# Patient Record
Sex: FEMALE | Race: WHITE | NOT HISPANIC OR LATINO | Employment: OTHER | URBAN - METROPOLITAN AREA
[De-identification: names, ages, dates, MRNs, and addresses within clinical notes are randomized per-mention and may not be internally consistent; named-entity substitution may affect disease eponyms.]

---

## 2017-05-06 ENCOUNTER — HOSPITAL ENCOUNTER (EMERGENCY)
Facility: HOSPITAL | Age: 82
Discharge: HOME | End: 2017-05-06
Attending: EMERGENCY MEDICINE
Payer: MEDICARE

## 2017-05-06 VITALS
DIASTOLIC BLOOD PRESSURE: 60 MMHG | HEIGHT: 60 IN | TEMPERATURE: 98 F | OXYGEN SATURATION: 100 % | HEART RATE: 57 BPM | BODY MASS INDEX: 33.38 KG/M2 | SYSTOLIC BLOOD PRESSURE: 174 MMHG | RESPIRATION RATE: 18 BRPM | WEIGHT: 170 LBS

## 2017-05-06 DIAGNOSIS — S00.83XA FOREHEAD CONTUSION, INITIAL ENCOUNTER: ICD-10-CM

## 2017-05-06 DIAGNOSIS — E87.1 HYPONATREMIA: Primary | ICD-10-CM
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
## 2017-05-06 LAB
ALBUMIN SERPL BCP-MCNC: 3.6 G/DL
ALP SERPL-CCNC: 65 U/L
ALT SERPL W/O P-5'-P-CCNC: 114 U/L
ANION GAP SERPL CALC-SCNC: 9 MMOL/L
AST SERPL-CCNC: 72 U/L
BACTERIA #/AREA URNS HPF: NORMAL /HPF
BASOPHILS # BLD AUTO: 0.01 K/UL
BASOPHILS NFR BLD: 0.2 %
BILIRUB SERPL-MCNC: 0.3 MG/DL
BILIRUB UR QL STRIP: NEGATIVE
BUN SERPL-MCNC: 19 MG/DL
CALCIUM SERPL-MCNC: 9.1 MG/DL
CHLORIDE SERPL-SCNC: 98 MMOL/L
CLARITY UR: CLEAR
CO2 SERPL-SCNC: 24 MMOL/L
COLOR UR: YELLOW
CREAT SERPL-MCNC: 1.4 MG/DL
DIFFERENTIAL METHOD: ABNORMAL
EOSINOPHIL # BLD AUTO: 0 K/UL
EOSINOPHIL NFR BLD: 0.7 %
ERYTHROCYTE [DISTWIDTH] IN BLOOD BY AUTOMATED COUNT: 14.3 %
EST. GFR  (AFRICAN AMERICAN): 39 ML/MIN/1.73 M^2
EST. GFR  (NON AFRICAN AMERICAN): 34 ML/MIN/1.73 M^2
GLUCOSE SERPL-MCNC: 124 MG/DL
GLUCOSE UR QL STRIP: NEGATIVE
HCT VFR BLD AUTO: 29.4 %
HGB BLD-MCNC: 9.8 G/DL
HGB UR QL STRIP: NEGATIVE
HYALINE CASTS #/AREA URNS LPF: 0 /LPF
KETONES UR QL STRIP: NEGATIVE
LEUKOCYTE ESTERASE UR QL STRIP: NEGATIVE
LYMPHOCYTES # BLD AUTO: 0.9 K/UL
LYMPHOCYTES NFR BLD: 15.5 %
MCH RBC QN AUTO: 30 PG
MCHC RBC AUTO-ENTMCNC: 33.3 %
MCV RBC AUTO: 90 FL
MICROSCOPIC COMMENT: NORMAL
MONOCYTES # BLD AUTO: 0.6 K/UL
MONOCYTES NFR BLD: 10.5 %
NEUTROPHILS # BLD AUTO: 4.4 K/UL
NEUTROPHILS NFR BLD: 72.8 %
NITRITE UR QL STRIP: NEGATIVE
PH UR STRIP: 6 [PH] (ref 5–8)
PLATELET # BLD AUTO: 175 K/UL
PMV BLD AUTO: 8.8 FL
POTASSIUM SERPL-SCNC: 3.8 MMOL/L
PROT SERPL-MCNC: 6.7 G/DL
PROT UR QL STRIP: ABNORMAL
RBC # BLD AUTO: 3.27 M/UL
RBC #/AREA URNS HPF: 2 /HPF (ref 0–4)
SODIUM SERPL-SCNC: 131 MMOL/L
SP GR UR STRIP: 1.02 (ref 1–1.03)
URN SPEC COLLECT METH UR: ABNORMAL
UROBILINOGEN UR STRIP-ACNC: NEGATIVE EU/DL
WBC # BLD AUTO: 5.99 K/UL
WBC #/AREA URNS HPF: 1 /HPF (ref 0–5)

## 2017-05-06 PROCEDURE — 85025 COMPLETE CBC W/AUTO DIFF WBC: CPT

## 2017-05-06 PROCEDURE — 25000003 PHARM REV CODE 250: Performed by: EMERGENCY MEDICINE

## 2017-05-06 PROCEDURE — 99284 EMERGENCY DEPT VISIT MOD MDM: CPT

## 2017-05-06 PROCEDURE — 81000 URINALYSIS NONAUTO W/SCOPE: CPT

## 2017-05-06 PROCEDURE — 93005 ELECTROCARDIOGRAM TRACING: CPT

## 2017-05-06 PROCEDURE — 80053 COMPREHEN METABOLIC PANEL: CPT

## 2017-05-06 RX ORDER — ESOMEPRAZOLE MAGNESIUM 40 MG/1
40 CAPSULE, DELAYED RELEASE ORAL
COMMUNITY
End: 2017-06-02 | Stop reason: SDUPTHER

## 2017-05-06 RX ORDER — TRAMADOL HYDROCHLORIDE 50 MG/1
50 TABLET ORAL EVERY 6 HOURS PRN
Status: ON HOLD | COMMUNITY
End: 2017-12-06

## 2017-05-06 RX ORDER — SUCRALFATE 1 G/1
1 TABLET ORAL 4 TIMES DAILY
COMMUNITY

## 2017-05-06 RX ORDER — NAPROXEN SODIUM 220 MG/1
81 TABLET, FILM COATED ORAL DAILY
COMMUNITY
End: 2017-09-22 | Stop reason: SDUPTHER

## 2017-05-06 RX ORDER — TRAMADOL HYDROCHLORIDE 50 MG/1
50 TABLET ORAL
Status: COMPLETED | OUTPATIENT
Start: 2017-05-06 | End: 2017-05-06

## 2017-05-06 RX ORDER — AMIODARONE HYDROCHLORIDE 200 MG/1
TABLET ORAL DAILY
COMMUNITY
End: 2017-09-28 | Stop reason: SDUPTHER

## 2017-05-06 RX ORDER — BIMATOPROST 0.3 MG/ML
1 SOLUTION/ DROPS OPHTHALMIC NIGHTLY
COMMUNITY
End: 2017-05-10 | Stop reason: SDUPTHER

## 2017-05-06 RX ORDER — LORATADINE 10 MG/1
10 TABLET ORAL DAILY
COMMUNITY
End: 2017-05-17 | Stop reason: SDUPTHER

## 2017-05-06 RX ORDER — LEVOTHYROXINE SODIUM 50 UG/1
50 TABLET ORAL DAILY
COMMUNITY
End: 2017-06-07 | Stop reason: SDUPTHER

## 2017-05-06 RX ORDER — DEXTROMETHORPHAN HYDROBROMIDE, GUAIFENESIN 5; 100 MG/5ML; MG/5ML
650 LIQUID ORAL EVERY 8 HOURS
COMMUNITY
End: 2017-06-02 | Stop reason: SDUPTHER

## 2017-05-06 RX ORDER — NITROGLYCERIN 0.4 MG/1
0.4 TABLET SUBLINGUAL EVERY 5 MIN PRN
COMMUNITY

## 2017-05-06 RX ORDER — DIPHENHYDRAMINE HCL 25 MG
25 CAPSULE ORAL EVERY 6 HOURS PRN
COMMUNITY
End: 2017-07-03

## 2017-05-06 RX ORDER — TROLAMINE SALICYLATE 10 G/100G
CREAM TOPICAL
COMMUNITY

## 2017-05-06 RX ORDER — BRIMONIDINE TARTRATE AND TIMOLOL MALEATE 2; 5 MG/ML; MG/ML
1 SOLUTION OPHTHALMIC
COMMUNITY
End: 2017-08-01 | Stop reason: SDUPTHER

## 2017-05-06 RX ORDER — LISINOPRIL 20 MG/1
20 TABLET ORAL DAILY
COMMUNITY
End: 2017-06-02 | Stop reason: SDUPTHER

## 2017-05-06 RX ORDER — ROSUVASTATIN CALCIUM 5 MG/1
5 TABLET, COATED ORAL DAILY
Status: ON HOLD | COMMUNITY
End: 2017-09-04 | Stop reason: HOSPADM

## 2017-05-06 RX ORDER — HYDRALAZINE HYDROCHLORIDE 50 MG/1
50 TABLET, FILM COATED ORAL 3 TIMES DAILY
COMMUNITY
End: 2017-06-30 | Stop reason: SDUPTHER

## 2017-05-06 RX ORDER — POLYETHYLENE GLYCOL 3350 17 G/17G
17 POWDER, FOR SOLUTION ORAL DAILY
COMMUNITY
End: 2017-06-07 | Stop reason: SDUPTHER

## 2017-05-06 RX ORDER — METOPROLOL SUCCINATE 25 MG/1
25 TABLET, EXTENDED RELEASE ORAL DAILY
COMMUNITY
End: 2017-11-20 | Stop reason: SDUPTHER

## 2017-05-06 RX ORDER — IPRATROPIUM BROMIDE 0.5 MG/2.5ML
500 SOLUTION RESPIRATORY (INHALATION) 4 TIMES DAILY
COMMUNITY

## 2017-05-06 RX ORDER — HYOSCYAMINE SULFATE 0.125 MG
125 TABLET ORAL EVERY 4 HOURS PRN
COMMUNITY

## 2017-05-06 RX ORDER — DORZOLAMIDE HCL 20 MG/ML
1 SOLUTION/ DROPS OPHTHALMIC 3 TIMES DAILY
COMMUNITY
End: 2017-08-01 | Stop reason: SDUPTHER

## 2017-05-06 RX ADMIN — TRAMADOL HYDROCHLORIDE 50 MG: 50 TABLET, COATED ORAL at 02:05

## 2017-05-06 NOTE — ED AVS SNAPSHOT
[facility] [address]  [place] [address]               Alyssa Hastings   2017 12:59 PM   ED    Description:  Female : 1930   Department:  [facility]           Your Care was Coordinated By:     Provider Role From To    Audrey Block MD Attending Provider 17 1301 --      Reason for Visit     Fall           Diagnoses this Visit        Comments    Hyponatremia    -  Primary     Fall         Forehead contusion, initial encounter           ED Disposition     None           To Do List           Follow-up Information     Please follow up.    Why:  FOLLOW UP WITH YOUR PRIMARY CARE DOCTOR IN 1 WEEK      [facility]     [facility] Care Line -  Assistance  Unless otherwise directed by your provider, please contact [facility], our nurse care line that is available for  assistance.     Registered nurses in the [facility] provide: appointment scheduling, clinical advisement, health education, and other advisory services.  Call: [phone] (toll free)               Medications           Message regarding Medications     Verify the changes and/or additions to your medication regime listed below are the same as discussed with your clinician today.  If any of these changes or additions are incorrect, please notify your healthcare provider.        These medications were administered today        Dose Freq    tramadol tablet 50 mg 50 mg ED 1 Time    Sig: Take 1 tablet (50 mg total) by mouth ED 1 Time.    Class: Normal    Route: Oral           Verify that the below list of medications is an accurate representation of the medications you are currently taking.  If none reported, the list may be blank. If incorrect, please contact your healthcare provider. Carry this list with you in case of emergency.           Current Medications     acetaminophen (TYLENOL) 650 MG TbSR Take 650 mg by mouth every 8 (eight)  hours.    amiodarone (PACERONE) 200 MG Tab Take by mouth once daily.    aspirin 81 MG Chew Take 81 mg by mouth once daily.    bimatoprost (LUMIGAN) 0.03 % ophthalmic drops 1 drop every evening.    brimonidine-timolol (COMBIGAN) 0.2-0.5 % Drop Place 1 drop into both eyes.    diphenhydrAMINE (BENADRYL) 25 mg capsule Take 25 mg by mouth every 6 (six) hours as needed for Itching.    dorzolamide (TRUSOPT) 2 % ophthalmic solution 1 drop 3 (three) times daily.    esomeprazole (NEXIUM) 40 MG capsule Take 40 mg by mouth before breakfast.    hydrALAZINE (APRESOLINE) 50 MG tablet Take 50 mg by mouth 3 (three) times daily.    hyoscyamine (ANASPAZ,LEVSIN) 0.125 mg Tab Take 125 mcg by mouth every 4 (four) hours as needed.    ipratropium (ATROVENT) 0.02 % nebulizer solution Take 500 mcg by nebulization 4 (four) times daily. Rescue    levothyroxine (SYNTHROID) 50 MCG tablet Take 50 mcg by mouth once daily.    lisinopril (PRINIVIL,ZESTRIL) 20 MG tablet Take 20 mg by mouth once daily.    loratadine (CLARITIN) 10 mg tablet Take 10 mg by mouth once daily.    metoprolol succinate (TOPROL-XL) 25 MG 24 hr tablet Take 25 mg by mouth once daily.    nitroGLYCERIN (NITROSTAT) 0.4 MG SL tablet Place 0.4 mg under the tongue every 5 (five) minutes as needed for Chest pain.    polyethylene glycol (GLYCOLAX) 17 gram/dose powder Take 17 g by mouth once daily.    rosuvastatin (CRESTOR) 5 MG tablet Take 5 mg by mouth once daily.    sucralfate (CARAFATE) 1 gram tablet Take 1 g by mouth 4 (four) times daily.    tramadol (ULTRAM) 50 mg tablet Take 50 mg by mouth every 6 (six) hours as needed for Pain.    trolamine salicylate (ASPERCREME) 10 % cream Apply topically as needed.    tramadol tablet 50 mg Take 1 tablet (50 mg total) by mouth ED 1 Time.           Clinical Reference Information           Your Vitals Were     BP Pulse Temp Resp Height Weight    138/53 58 98.4 °F (36.9 °C) (Oral) 18 5' (1.524 m) 77.1 kg (170 lb)    SpO2 BMI             98% 33.2  kg/m2         Allergies as of 5/6/2017        Reactions    Actos [Pioglitazone]     Amlodipine     Bentyl [Dicyclomine]     Benzocaine     Declomycin [Demeclocycline]     Doxycycline     Erythropoietin Analogues     Gabapentin     Hydrocodone     Lipitor [Atorvastatin]     Losartan     Metronidazole     Nortriptyline     Oxybutynin     Pcn [Penicillins]     Pravachol [Pravastatin]     Procaine     Promethazine     Propranolol     Ramipril     Sulfa (Sulfonamide Antibiotics)       Immunizations Administered on Date of Encounter - 5/6/2017     None      ED Micro, Lab, POCT     Start Ordered       Status Ordering Provider    05/06/17 1312 05/06/17 1311  CBC auto differential  STAT      Final result     05/06/17 1312 05/06/17 1311  Comprehensive metabolic panel  STAT      Final result     05/06/17 1312 05/06/17 1311  Urinalysis Clean Catch  STAT      Final result     05/06/17 1311 05/06/17 1311  Urinalysis Microscopic  Once      Final result       ED Imaging Orders     Start Ordered       Status Ordering Provider    05/06/17 1312 05/06/17 1311  CT Head Without Contrast  1 time imaging      Final result     05/06/17 1312 05/06/17 1311  CT Cervical Spine Without Contrast  1 time imaging      Final result         Discharge Instructions         Facial Contusion  A contusion is another word for a bruise. It happens when small blood vessels break open and leak blood into the nearby area. A facial contusion can result from a bump, hit, or fall. This may happen during sports or an accident. Symptoms of a contusion often include changes in skin color (bruising), swelling, and pain.   The swelling from the contusion should decrease in a few days. Bruising and pain may take several weeks to go away.   Home care  · If you have been prescribed medicines for pain, take them as directed.  · To help reduce swelling and pain, wrap a cold pack or bag of frozen peas in a thin towel. Put it on the injured area for up to 20 minutes. Do this  a few times a day until the swelling goes down.   · If you have scrapes or cuts on your face requiring stiches or other closures, care for them as directed.  · For the next 24 hours (or longer if instructed):  ¨ Dont drink alcohol, or use sedatives or medicines that make you sleepy.  ¨ Dont drive or operate machinery.  ¨ Avoid doing anything strenuous. Dont lift or strain.  ¨ Do not return to sports or other activity that could result in another head injury.  Note about concussion  Because the injury was to your head, it is possible that a concussion (mild brain injury) could result. You don't have signs of a concussion at this time. But symptoms can show up later. Be alert for signs and symptoms of a concussion. Seek emergency medical care if any of these develop over the next hours to days:  · Headache  · Nausea or vomiting  · Dizziness  · Sensitivity to light or noise  · Unusual sleepiness or grogginess  · Trouble falling asleep  · Personality changes  · Vision changes  · Memory loss  · Confusion  · Trouble walking or clumsiness  · Loss of consciousness (even for a short time)  · Inability to be awakened   Follow-up care  Follow up with your healthcare provider or our staff as directed.  When to seek medical advice  Call your healthcare provider right away if any of these occur:  · Swelling or pain that gets worse, not better  · New swelling or pain  · Warmth or drainage from the swollen area or from cuts or scrapes  · Fluid drainage or bleeding from the nose or ears  · Fever of 100.4ºF (38ºC) or higher, or as directed by your healthcare provider  Date Last Reviewed: 5/7/2015  © 5478-8498 [employer]. [address] [place] [address], [place] [address]. All rights reserved. This information is not intended as a substitute for professional medical care. Always follow your healthcare professional's instructions.          Fall Due to Dizziness, Weakness, or Loss of Balance  The symptoms that led to your fall  have been evaluated. Your doctor feels it is safe for you to return home.  Many things can cause you to become dizzy. Everyone means a little something different by the word dizzy. People may describe their symptoms using these words:  · It doesnt feel right in my head  · It feels like spinning in my head  · It seems like the room is spinning  · My balance feels off  · I feel lightheaded, like I am going to pass out  All these descriptions can have real causes.     Your balance mechanism is in your inner ear. Anything disturbing it can make you feel dizzy, whether it is from a cold, an injury, or many other things. Anything that causes your blood pressure to drop suddenly can make you feel lightheaded, or like you are going to faint. This is because at that moment there might not be enough blood flowing to your brain. Causes include:  · Medicines  · Dehydration  · Standing up or bending over too quickly  · Becoming overheated  · Taking a hot shower or bath  · Straining hard while lifting something or using the toilet  · Strokes, heart attack, heart valve disease, very slow or very fast heart rate  · Low blood sugar  · Ear infection  · Hyperventilation  · Anemia  · Trauma  · Infection  · Panic attack  · Pregnancy  You may be at risk of repeat falls. Take precautions described below to prevent another fall.  Home care  · If you become lightheaded or dizzy, lie down immediately or sit and lean forward with your head down. It is better to do this than fall and seriously hurt or injure yourself.  · Rest today. When changing position, take a moment to be sure any dizziness goes away before standing and walking.  · If you have been prescribed a walker, be sure to use it whenever you walk, even if it is a short distance.  · If you were injured during the fall, follow the advice from your doctor regarding care of your injury.  · Be sure your doctor knows all the medicines, herbs, and supplements you take. Some medicines can  cause dizziness.  Follow-up care  Unless given other advice, call your doctor on the next office day to advise of your fall and to schedule an appointment. You may require further treatment for the underlying condition that caused todays fall.  If X-ray or a CT scan were done, you will be notified of the results, especially if it affects treatment.  Call 911  Call 911 if any of these occur:  · Trouble breathing  · Confused or difficulty arousing  · Fainting or loss of consciousness  · Rapid or very slow heart rate  · Seizure  · Difficulty with speech or vision, weakness of an arm or leg  · Difficulty walking or talking, loss of balance  · Numbness or weakness in one side of your body, facial droop  When to seek medical advice  Call your healthcare provider right away if any of the following occur:  · Another fall  · Continued dizzy spells  · Severe headache  · Blood in vomit or stools (black or red color)  Date Last Reviewed: 11/5/2015  © 7132-0806 [employer]. [address] [place] [address]. All rights reserved. This information is not intended as a substitute for professional medical care. Always follow your healthcare professional's instructions.          [facility] Sign-Up     Activating your [facility] account is as easy as 1-2-3!     1) Visit [email or web address], select Sign Up Now, enter this activation code and your date of birth, then select Next.  [email or web address]  Expires: 6/20/2017  2:39 PM      2) Create a username and password to use when you visit [facility] in the future and select a security question in case you lose your password and select Next.    3) Enter your e-mail address and click Sign Up!    Additional Information  If you have questions, please e-mail [email or web address] or call [phone] to talk to our [facility] staff. Remember, [facility] is NOT to be used for urgent needs. For medical emergencies, dial 911.          [facility] complies with  applicable Federal civil rights laws and does not discriminate on the basis of race, color, national origin, age, disability, or sex.        Language Assistance Services     ATTENTION: Language assistance services are available, free of charge. Please call [phone].      ATENCIÓN: Si habla sheldon, tiene a bailey disposición servicios gratuitos de asistencia lingüística. Llame al [phone].     CHÚ Ý: N?u b?n nói Ti?ng Vi?t, có các d?ch v? h? tr? ngôn ng? mi?n phí dành cho b?n. G?i s? [phone].

## 2017-05-06 NOTE — ED PROVIDER NOTES
"Encounter Date: 5/6/2017       History     Chief Complaint   Patient presents with    Fall     unwitnessed fall one hour pta; [facility] and was found on ground by an aide; denies LOC; states felt dizzy before falling; bruising noted to left forehead; son reports pt moved recent and has had decreased appetite and has been increasingly anxious     Review of patient's allergies indicates:   Allergen Reactions    Actos [pioglitazone]     Amlodipine     Bentyl [dicyclomine]     Benzocaine     Declomycin [demeclocycline]     Doxycycline     Erythropoietin analogues     Gabapentin     Hydrocodone     Lipitor [atorvastatin]     Losartan     Metronidazole     Nortriptyline     Oxybutynin     Pcn [penicillins]     Pravachol [pravastatin]     Procaine     Promethazine     Propranolol     Ramipril     Sulfa (sulfonamide antibiotics)      HPI Comments: 87 Y/O WF PRESENTS TO ED FOR EVALUATION S/P FALL.  PT HAS RECENTLY MOVED TO AN ASSISTED LIVING FACILITY AND REPORTS SHE IS ANXIOUS OVER THE MOVE.  TODAY SHE WAS ATTEMPTING TO SIT IN A CHAIR AND WHILE LOWERING HER BODY TO THE CHAIR, SHE BEGAN DIZZY AND FELL FORWARD HITTING HER HEAD ON A PIECE OF FURNITURE.  NO LOC.  THE FALL WAS UNWITNESSED BUT THE NURSING STAFF ARRIVED SHORTLY AFTER AND HELPED THE PATIENT INTO THE CHAIR.  PT DENIES ANY LOC OR NECK PAIN.  + FOREHEAD CONTUSION.  NO LACERATION OR BLEEDING.  NO SYNCOPE.  PT DENIES ANY DIZZINESS AT THIS TIME.  SHE STATES THAT FOR THE PAST FEW DAYS, SHE HAS HAD N/V AFTER EATING SOMETHING THAT UPSET HER STOMACH.  NO DIARRHEA.  NO KNOWN SICK CONTACTS.  NO VOMITING TODAY.  PT REPORTS EATING BREAKFAST W/O N/V THIS MORNING.  NO ABD PAIN.  NO CP, PALPITATIONS, SOB, DIAPHORESIS OR NEW MEDICATION.  PT CURRENTLY HAS NO COMPLAINTS OTHER THAN FEELING "SORE" ON HER FOREHEAD.      The history is provided by the patient. No  was used.     Past Medical History:   Diagnosis Date    " Coronary artery disease     Diabetes mellitus     GERD (gastroesophageal reflux disease)     Hypertension     Stroke     Thyroid disease      Past Surgical History:   Procedure Laterality Date    APPENDECTOMY      CARDIAC SURGERY      CHOLECYSTECTOMY      EYE SURGERY      HYSTERECTOMY       History reviewed. No pertinent family history.  Social History   Substance Use Topics    Smoking status: Never Smoker    Smokeless tobacco: None    Alcohol use No     Review of Systems   Constitutional: Negative for chills and fever.   HENT: Negative for congestion and sore throat.         FOREHEAD CONTUSION   Eyes: Negative for photophobia and visual disturbance.   Respiratory: Negative for cough and shortness of breath.    Cardiovascular: Negative for chest pain and palpitations.   Gastrointestinal: Negative for abdominal pain, constipation and diarrhea.        N/V OVER THE PREVIOUS 2 DAYS.  NONE TODAY   Endocrine: Negative for polydipsia and polyphagia.   Genitourinary: Negative for difficulty urinating and dysuria.   Musculoskeletal: Positive for neck pain. Negative for back pain and joint swelling.        CHRONIC NECK PAIN, LEFT THUMB PAIN   Skin: Positive for wound. Negative for pallor.        FOREHEAD CONTUSION   Allergic/Immunologic: Negative for immunocompromised state.   Neurological: Positive for dizziness. Negative for tremors, seizures, syncope, facial asymmetry, speech difficulty, weakness, light-headedness, numbness and headaches.   Hematological: Does not bruise/bleed easily.   Psychiatric/Behavioral: Negative for agitation and confusion.   All other systems reviewed and are negative.      Physical Exam   Initial Vitals   BP Pulse Resp Temp SpO2   05/06/17 1235 05/06/17 1235 05/06/17 1235 05/06/17 1235 05/06/17 1235   101/54 58 18 98.4 °F (36.9 °C) 94 %     Physical Exam    Nursing note and vitals reviewed.  Constitutional: She appears well-developed and well-nourished. She is not diaphoretic. No  distress.   HENT:   Head: Normocephalic. Not macrocephalic and not microcephalic. Head is with contusion. Head is without Joe's sign, without abrasion, without laceration, without right periorbital erythema and without left periorbital erythema.       Nose: Nose normal.   FOREHEAD CONTUSION   Eyes: Conjunctivae and EOM are normal. Pupils are equal, round, and reactive to light. No scleral icterus.   Neck: Normal range of motion. Neck supple.   Cardiovascular: Regular rhythm and normal heart sounds. Exam reveals no gallop and no friction rub.    No murmur heard.  SINUS BRADYCARDIA   Pulmonary/Chest: Breath sounds normal. No respiratory distress. She has no wheezes. She has no rhonchi. She has no rales.   Abdominal: Soft. Bowel sounds are normal. She exhibits no distension. There is no tenderness. There is no rebound and no guarding.   Musculoskeletal: Normal range of motion. She exhibits no tenderness.    PT REPORTS MILD LEFT THUMB PAIN.  SHE HAS FULL ROM AND NO DEFORMITY OR OBVIOUS INJURY.  SENSATION INTACT.     Lymphadenopathy:     She has no cervical adenopathy.   Neurological: She is alert and oriented to person, place, and time. She has normal strength. No cranial nerve deficit or sensory deficit.   Skin: Skin is warm and dry. No rash noted. No erythema.   Psychiatric: She has a normal mood and affect. Her behavior is normal. Judgment and thought content normal.         ED Course   Procedures  Labs Reviewed   CBC W/ AUTO DIFFERENTIAL - Abnormal; Notable for the following:        Result Value    RBC 3.27 (*)     Hemoglobin 9.8 (*)     Hematocrit 29.4 (*)     MPV 8.8 (*)     Lymph # 0.9 (*)     Lymph% 15.5 (*)     All other components within normal limits   COMPREHENSIVE METABOLIC PANEL   URINALYSIS     EKG Readings: (Independently Interpreted)   Initial Reading: No STEMI. Heart Rate: 59.          Medical Decision Making:   Initial Assessment:   85 Y/O F PRESENTS FOR EVALUATION S/P FALL.  PT IS CURRENTLY  ASYMPTOMATIC.  PT IS NV INTACT BY EXAM.    Differential Diagnosis:   DDX:  SUBDURAL HEMATOMA, SCALP LACERATION, DIZZINESS, NEAR SYNCOPE, METABOLIC DISORDER, VIRAL ILLNESS, DEHYDRATION, UTI, ARRHYTHMIA.   Independently Interpreted Test(s):   I have ordered and independently interpreted EKG Reading(s) - see prior notes  Clinical Tests:   Lab Tests: Ordered and Reviewed       <> Summary of Lab: HYPONATREMIA  Radiological Study: Ordered and Reviewed  Medical Tests: Ordered and Reviewed  ED Management:   PT REFUSES XRAY OF LEFT THUMB.  WORK UP IN ED ONLY + FOR HYPONATREMIA.  PT REPORTS THAT SHE DOES DRINK LARGE AMOUNTS OF WATER DURING THE DAY.  HER B/P IS TYPICALLY CONTROLLED.  CT BRAIN AND C-SPINE NEG.  I HAVE DISCUSSED THE NEED FOR F/U WITH PT AND HER SON.  THEY VERBALIZE UNDERSTANDING AND AGREEMENT OF D/C PLAN.      Pt counseled on their diagnosis and the importance of following up with PCP.  Pt also cautioned on when to return to ED.  Pt verbalizes understanding of discharge plan and will return to ED immediately if symptoms worsen                     ED Course     Clinical Impression:   The primary encounter diagnosis was Hyponatremia. Diagnoses of Fall and Forehead contusion, initial encounter were also pertinent to this visit.    Disposition:   Disposition: Discharged  Condition: Stable       Audrey Block MD  05/06/17 1518       Audrey Block MD  05/06/17 1511

## 2017-05-06 NOTE — ED NOTES
Patient has verified the spelling of their name and  on armband  LOC: The patient is awake, alert, and aware of environment with an appropriate affect, the patient is oriented x 4 and speaking appropriately.   APPEARANCE: Patient resting comfortably and in no acute distress, patient is clean and well groomed, patient's clothing is properly fastened.   SKIN: The skin is warm and dry, color consistent with ethnicity, patient has normal skin turgor and moist mucus membranes,  Bruising and slight swelling noted to left side forehead s/p fall.   : Voids without difficulty  MUSCULOSKELETAL: Patient moving all extremities spontaneously, no obvious swelling or deformities noted, generalized weakness.   RESPIRATORY: Airway is open and patent, respirations are spontaneous, patient has a normal effort and rate, no accessory muscle use noted, bilateral breath sounds clear, denies SOB   ABDOMEN: Soft and non tender to palpation, no distention noted, normoactive bowel sounds present in all four quadrants.   CARDIAC: Normal rate and rhythm, no peripheral edema noted, less then 3 second capillary refill, denies chest pain  COMPLAINT:

## 2017-05-10 ENCOUNTER — EXTERNAL VISIT (OUTPATIENT)
Dept: FAMILY MEDICINE | Facility: CLINIC | Age: 82
End: 2017-05-10
Payer: MEDICARE

## 2017-05-10 VITALS
HEART RATE: 66 BPM | RESPIRATION RATE: 18 BRPM | DIASTOLIC BLOOD PRESSURE: 64 MMHG | OXYGEN SATURATION: 99 % | TEMPERATURE: 98 F | SYSTOLIC BLOOD PRESSURE: 130 MMHG

## 2017-05-10 DIAGNOSIS — H40.9 GLAUCOMA OF BOTH EYES, UNSPECIFIED GLAUCOMA: ICD-10-CM

## 2017-05-10 DIAGNOSIS — H35.30 MACULAR DEGENERATION OF BOTH EYES: ICD-10-CM

## 2017-05-10 DIAGNOSIS — H54.40 BLINDNESS OF LEFT EYE: ICD-10-CM

## 2017-05-10 DIAGNOSIS — I10 ESSENTIAL HYPERTENSION: Primary | ICD-10-CM

## 2017-05-10 DIAGNOSIS — J30.89 ALLERGIC RHINITIS DUE TO OTHER ALLERGIC TRIGGER, UNSPECIFIED RHINITIS SEASONALITY: ICD-10-CM

## 2017-05-10 DIAGNOSIS — E03.9 HYPOTHYROIDISM, UNSPECIFIED TYPE: ICD-10-CM

## 2017-05-10 PROBLEM — J30.9 ALLERGIC RHINITIS DUE TO ALLERGEN: Status: ACTIVE | Noted: 2017-05-10

## 2017-05-10 PROCEDURE — 99204 OFFICE O/P NEW MOD 45 MIN: CPT | Mod: S$GLB,,, | Performed by: FAMILY MEDICINE

## 2017-05-10 RX ORDER — OLOPATADINE HYDROCHLORIDE 2 MG/ML
1 SOLUTION/ DROPS OPHTHALMIC DAILY
Qty: 2.5 ML | Refills: 2 | Status: SHIPPED | OUTPATIENT
Start: 2017-05-10 | End: 2017-08-01 | Stop reason: SDUPTHER

## 2017-05-10 RX ORDER — IPRATROPIUM BROMIDE 42 UG/1
1 SPRAY, METERED NASAL 2 TIMES DAILY PRN
Qty: 15 ML | Refills: 1 | Status: SHIPPED | OUTPATIENT
Start: 2017-05-10 | End: 2017-10-16 | Stop reason: SDUPTHER

## 2017-05-10 RX ORDER — BIMATOPROST 0.3 MG/ML
1 SOLUTION/ DROPS OPHTHALMIC NIGHTLY
Qty: 2.5 ML | Refills: 3 | Status: SHIPPED | OUTPATIENT
Start: 2017-05-10 | End: 2017-06-07 | Stop reason: SDUPTHER

## 2017-05-10 NOTE — PROGRESS NOTES
Subjective:       Patient ID: Alyssa Hastings is a 86 y.o. female.    Chief Complaint: Medication Refill    HPI 86 year old female seen in [facility] this am for medication refills. She would like to discuss taking her glaucoma eye drops and nasal spray in her room at nighttime. Her other medication is administered by the staff. She moved from [place] to the [facility] and a few days into her stay here, she fell. She does not remember losing consciousness. She fell and hit her head and the left side of her face on 5/6/17. CT scans done at the local ED did not show a fracture of cervical spine or acute abnormalities on ct head.   Review of Systems   Constitutional: Negative for chills and fever.   Eyes: Negative for visual disturbance.   Respiratory: Negative for chest tightness and shortness of breath.    Cardiovascular: Negative for chest pain and leg swelling.   Gastrointestinal: Positive for nausea. Negative for abdominal pain, diarrhea and vomiting.       Objective:      Vitals:    05/10/17 1007   BP: 130/64   Pulse: 66   Resp: 18   Temp: 98.4 °F (36.9 °C)     Physical Exam   Constitutional: She appears well-developed and well-nourished. No distress.   HENT:   Bruising perioribtally of left eye and right eye, moreso left. TTP over left eyebrow, improving per patient  No ocular deficit  Limited peripheral vision-chronic     Cardiovascular: Normal rate and regular rhythm.    Pulmonary/Chest: Effort normal. She has no wheezes.   Abdominal: Soft. There is no tenderness. There is no rebound and no guarding.   Neurological: She is alert.   Psychiatric: She has a normal mood and affect. Her behavior is normal. Judgment and thought content normal.   Vitals reviewed.      Assessment:       1. Essential hypertension    2. Hypothyroidism, unspecified type    3. Glaucoma of both eyes, unspecified glaucoma    4. Macular degeneration of both eyes    5. Blindness of left eye    6. Allergic rhinitis due to other  allergic trigger, unspecified rhinitis seasonality        Plan:         1. HTN. Well controlled.   2. Hypothyroidism: will check tsh in 6 months. Continue synthroid 50 mcg daily  3. Glaucoma, macular degeneration, and blindness of left eye: refer to ophtho to establish care. Patient and nurses understand to let patient administer eye drops and ansal spray  4. Recent fall with facial bruising, pain and swelling improving. Patient to monitor and alert medical staff if swelling/pain worsens.  5. RTC prn  Essential hypertension    Hypothyroidism, unspecified type    Glaucoma of both eyes, unspecified glaucoma  -     Ambulatory referral to Ophthalmology    Macular degeneration of both eyes  -     Ambulatory referral to Ophthalmology    Blindness of left eye  -     Ambulatory referral to Ophthalmology    Allergic rhinitis due to other allergic trigger, unspecified rhinitis seasonality    Other orders  -     ipratropium (ATROVENT) 0.06 % nasal spray; 1 spray by Nasal route 2 (two) times daily as needed for Rhinitis.  Dispense: 15 mL; Refill: 1  -     olopatadine (PATADAY) 0.2 % Drop; Place 1 drop into both eyes once daily.  Dispense: 2.5 mL; Refill: 2  -     bimatoprost (LUMIGAN) 0.03 % ophthalmic drops; Place 1 drop into both eyes nightly.  Dispense: 2.5 mL; Refill: 3      Return if symptoms worsen or fail to improve.

## 2017-05-17 ENCOUNTER — TELEPHONE (OUTPATIENT)
Dept: FAMILY MEDICINE | Facility: CLINIC | Age: 82
End: 2017-05-17

## 2017-05-17 RX ORDER — LORATADINE 10 MG/1
10 TABLET ORAL DAILY
Qty: 30 TABLET | Refills: 0 | Status: SHIPPED | OUTPATIENT
Start: 2017-05-17 | End: 2017-06-07

## 2017-05-17 NOTE — TELEPHONE ENCOUNTER
----- Message from Meera Vital sent at 5/17/2017  2:44 PM CDT -----  SABINO  At [facility]    [phone]  SABINO called to say patient wants something for allergies  Like Claratin  Or Zyrtec

## 2017-05-24 ENCOUNTER — EXTERNAL VISIT (OUTPATIENT)
Dept: FAMILY MEDICINE | Facility: CLINIC | Age: 82
End: 2017-05-24
Payer: MEDICARE

## 2017-05-24 VITALS
HEART RATE: 60 BPM | DIASTOLIC BLOOD PRESSURE: 60 MMHG | SYSTOLIC BLOOD PRESSURE: 136 MMHG | RESPIRATION RATE: 16 BRPM | OXYGEN SATURATION: 99 %

## 2017-05-24 DIAGNOSIS — M54.50 ACUTE RIGHT-SIDED LOW BACK PAIN WITHOUT SCIATICA: ICD-10-CM

## 2017-05-24 DIAGNOSIS — J30.89 ALLERGIC RHINITIS DUE TO OTHER ALLERGIC TRIGGER, UNSPECIFIED RHINITIS SEASONALITY: Primary | ICD-10-CM

## 2017-05-24 DIAGNOSIS — K59.00 CONSTIPATION, UNSPECIFIED CONSTIPATION TYPE: ICD-10-CM

## 2017-05-24 DIAGNOSIS — I10 ESSENTIAL HYPERTENSION: ICD-10-CM

## 2017-05-24 PROCEDURE — 99214 OFFICE O/P EST MOD 30 MIN: CPT | Mod: S$GLB,,, | Performed by: FAMILY MEDICINE

## 2017-05-24 NOTE — PROGRESS NOTES
Subjective:       Patient ID: Alyssa Hastings is a 86 y.o. female.    Chief Complaint: Back Pain; Constipation; and Allergies    HPI  86 year old female seen today for back pain for 2-3 days. Paitent has not had urinary changes. She states the pain is sharp, non radiating, and 7/10 in intensity. She takes tramadol twice a day prn which helps with the pain. Patient is interested in PT/OT. THe heating pad also helps.  Patient Is requesting claritin to be given daily.   Patient has had constipation all her life. She takes miralax, and prune juice. She states prune juice helps the most. Her last bowel movement was yesterday. She denies blood in her stool.   Review of Systems   Constitutional: Negative for appetite change, chills and fever.   Respiratory: Negative for chest tightness and shortness of breath.    Cardiovascular: Negative for chest pain and leg swelling.   Gastrointestinal: Positive for abdominal pain and constipation. Negative for blood in stool, diarrhea and nausea.   Genitourinary: Negative for dysuria, flank pain and hematuria.   Musculoskeletal: Positive for back pain.   Psychiatric/Behavioral: Negative for agitation and behavioral problems.       Objective:      Vitals:    05/24/17 0820   BP: 136/60   Pulse: 60   Resp: 16     Physical Exam   Constitutional: She is oriented to person, place, and time. She appears well-developed and well-nourished. No distress.   HENT:   Mouth/Throat: Oropharynx is clear and moist. No oropharyngeal exudate.   Eyes: EOM are normal. Right eye exhibits no discharge. Left eye exhibits no discharge.   Cardiovascular: Normal rate and regular rhythm.    Pulmonary/Chest: Effort normal. She has no wheezes.   Abdominal: Soft. There is tenderness. There is no rebound and no guarding.   Lower abdominal tenderness, without rebound or guarding   Musculoskeletal:        Lumbar back: She exhibits tenderness. She exhibits normal range of motion, no bony tenderness, no swelling and no  edema.        Back:    Neurological: She is alert and oriented to person, place, and time.   Skin: She is not diaphoretic.   Vitals reviewed.    straight leg negative bilaterally  Assessment:       1. Allergic rhinitis due to other allergic trigger, unspecified rhinitis seasonality    2. Essential hypertension    3. Acute right-sided low back pain without sciatica    4. Constipation, unspecified constipation type        Plan:         1. Allergic rhinitis: claritin changed from prn to daily. Continue nasal spray  2. HTN: well controlled  3. Acute lower back pain, right-likely MSK strain. Tramadol prn pain, and will order PT/OT today  4. Constipation: i have advised on increasing fiber via metamucil, prune juice, increasing water intake, and miralax prn.   Allergic rhinitis due to other allergic trigger, unspecified rhinitis seasonality    Essential hypertension    Acute right-sided low back pain without sciatica    Constipation, unspecified constipation type      Return if symptoms worsen or fail to improve.

## 2017-06-02 RX ORDER — ESOMEPRAZOLE MAGNESIUM 40 MG/1
40 CAPSULE, DELAYED RELEASE ORAL
Qty: 30 CAPSULE | Refills: 5 | Status: SHIPPED | OUTPATIENT
Start: 2017-06-02 | End: 2017-11-09 | Stop reason: SDUPTHER

## 2017-06-02 RX ORDER — LISINOPRIL 20 MG/1
20 TABLET ORAL DAILY
Qty: 30 TABLET | Refills: 5 | Status: SHIPPED | OUTPATIENT
Start: 2017-06-02 | End: 2017-07-19

## 2017-06-02 RX ORDER — DEXTROMETHORPHAN HYDROBROMIDE, GUAIFENESIN 5; 100 MG/5ML; MG/5ML
650 LIQUID ORAL EVERY 8 HOURS
Qty: 90 TABLET | Refills: 1 | Status: SHIPPED | OUTPATIENT
Start: 2017-06-02 | End: 2017-06-27 | Stop reason: SDUPTHER

## 2017-06-07 ENCOUNTER — EXTERNAL VISIT (OUTPATIENT)
Dept: FAMILY MEDICINE | Facility: CLINIC | Age: 82
End: 2017-06-07
Payer: MEDICARE

## 2017-06-07 VITALS
BODY MASS INDEX: 32.22 KG/M2 | HEART RATE: 93 BPM | SYSTOLIC BLOOD PRESSURE: 130 MMHG | DIASTOLIC BLOOD PRESSURE: 70 MMHG | WEIGHT: 165 LBS

## 2017-06-07 DIAGNOSIS — H35.30 MACULAR DEGENERATION OF BOTH EYES: ICD-10-CM

## 2017-06-07 DIAGNOSIS — J30.89 ALLERGIC RHINITIS DUE TO OTHER ALLERGIC TRIGGER, UNSPECIFIED RHINITIS SEASONALITY: ICD-10-CM

## 2017-06-07 DIAGNOSIS — F32.A DEPRESSION, UNSPECIFIED DEPRESSION TYPE: ICD-10-CM

## 2017-06-07 DIAGNOSIS — H40.9 GLAUCOMA OF BOTH EYES, UNSPECIFIED GLAUCOMA: Primary | ICD-10-CM

## 2017-06-07 DIAGNOSIS — E03.9 HYPOTHYROIDISM, UNSPECIFIED TYPE: ICD-10-CM

## 2017-06-07 PROCEDURE — 99214 OFFICE O/P EST MOD 30 MIN: CPT | Mod: S$GLB,,, | Performed by: FAMILY MEDICINE

## 2017-06-07 RX ORDER — POLYETHYLENE GLYCOL 3350 17 G/17G
17 POWDER, FOR SOLUTION ORAL DAILY PRN
Qty: 119 G | Refills: 1 | Status: SHIPPED | OUTPATIENT
Start: 2017-06-07 | End: 2017-08-16

## 2017-06-07 RX ORDER — LEVOCETIRIZINE DIHYDROCHLORIDE 5 MG/1
5 TABLET, FILM COATED ORAL NIGHTLY
Qty: 30 TABLET | Refills: 5 | Status: SHIPPED | OUTPATIENT
Start: 2017-06-07 | End: 2017-06-30 | Stop reason: SDUPTHER

## 2017-06-07 RX ORDER — LEVOTHYROXINE SODIUM 50 UG/1
50 TABLET ORAL DAILY
Qty: 30 TABLET | Refills: 5 | Status: SHIPPED | OUTPATIENT
Start: 2017-06-07

## 2017-06-07 RX ORDER — BIMATOPROST 0.3 MG/ML
1 SOLUTION/ DROPS OPHTHALMIC NIGHTLY
Qty: 2.5 ML | Refills: 3 | Status: SHIPPED | OUTPATIENT
Start: 2017-06-07 | End: 2017-08-01 | Stop reason: SDUPTHER

## 2017-06-07 NOTE — PROGRESS NOTES
Subjective:       Patient ID: Alyssa Hastings is a 86 y.o. female.    Chief Complaint: Follow-up; Allergies; and Depression    HPI 86 year old female here for a referral to an ophthalmologist for bilateral macular degeneration and glaucoma.   Peng has chronic allergic rhinitis. She takes ipratropium nasal spray and claritin daily. Patient is interested in switching antihistamines.  Patient has symptoms of depression since moving here. She states she needs more light in her room. Patient is seeing a counselor as of yesterday and states that is helpful. She denies SI/HI. She says it is difficult to communicate with other residents as they seem to be more reserved.   Review of Systems   Constitutional: Negative for chills and fever.   Respiratory: Negative for chest tightness and shortness of breath.    Cardiovascular: Negative for chest pain and leg swelling.   Gastrointestinal: Negative for abdominal pain, diarrhea, nausea and vomiting.   Musculoskeletal: Positive for arthralgias.       Objective:      Vitals:    06/07/17 0844   BP: 130/70   Pulse: 93     Physical Exam   Constitutional: She is oriented to person, place, and time. She appears well-developed and well-nourished. No distress.   HENT:   Mouth/Throat: Oropharynx is clear and moist. No oropharyngeal exudate.   Eyes: EOM are normal.   Cardiovascular: Normal rate and regular rhythm.    Pulmonary/Chest: Effort normal. She has no wheezes.   Abdominal: Soft. There is no tenderness.   Neurological: She is alert and oriented to person, place, and time.   Skin: She is not diaphoretic.       Assessment:       1. Glaucoma of both eyes, unspecified glaucoma    2. Macular degeneration of both eyes    3. Allergic rhinitis due to other allergic trigger, unspecified rhinitis seasonality    4. Depression, unspecified depression type    5. Hypothyroidism, unspecified type        Plan:         1. Glaucoma of both eyes and macular degeneration: referral to ophtho placed to  establish care  2. Allergic rhinitis: advised on switching to xyzal from claritin.   3. Hypothyroidism: check tsh. Refilled synthroid  4. Depression: as symptoms are correlate to date of move in. Likely adjustment disorder. Patient just began counseling and states it has helped so i have advised on continuing this. If there is no improvement i will recommend a SSRI medication in the coming months.   RTC prn.   Glaucoma of both eyes, unspecified glaucoma  -     Ambulatory Referral to Ophthalmology    Macular degeneration of both eyes  -     Ambulatory Referral to Ophthalmology    Allergic rhinitis due to other allergic trigger, unspecified rhinitis seasonality    Depression, unspecified depression type    Hypothyroidism, unspecified type  -     TSH; Future; Expected date: 06/07/2017    Other orders  -     levocetirizine (XYZAL) 5 MG tablet; Take 1 tablet (5 mg total) by mouth every evening.  Dispense: 30 tablet; Refill: 5  -     levothyroxine (SYNTHROID) 50 MCG tablet; Take 1 tablet (50 mcg total) by mouth once daily.  Dispense: 30 tablet; Refill: 5  -     bimatoprost (LUMIGAN) 0.03 % ophthalmic drops; Place 1 drop into both eyes nightly.  Dispense: 2.5 mL; Refill: 3      Return if symptoms worsen or fail to improve.

## 2017-06-13 ENCOUNTER — TELEPHONE (OUTPATIENT)
Dept: FAMILY MEDICINE | Facility: CLINIC | Age: 82
End: 2017-06-13

## 2017-06-13 DIAGNOSIS — R79.89 ABNORMAL TSH: Primary | ICD-10-CM

## 2017-06-13 NOTE — TELEPHONE ENCOUNTER
----- Message from Suad Viramontes sent at 6/13/2017 10:14 AM CDT -----  Contact: Dian [phone]  Dian a nurse with [facility] is calling about pt TSH level is 7.03. Should they stop thryroid medication  until she sees the pt next week, or do you want the pt to continue with the thyroid medication. Please call her at [phone] ask for Dian.

## 2017-06-15 ENCOUNTER — TELEPHONE (OUTPATIENT)
Dept: FAMILY MEDICINE | Facility: CLINIC | Age: 82
End: 2017-06-15

## 2017-06-15 NOTE — TELEPHONE ENCOUNTER
----- Message from Meera Vital sent at 6/15/2017 10:40 AM CDT -----  Connie with [facility]    [phone]  Connie called to say patient is requesting order for benedryl  For sinus and nasal drip  States she is not able to eat because of this

## 2017-06-16 ENCOUNTER — TELEPHONE (OUTPATIENT)
Dept: FAMILY MEDICINE | Facility: CLINIC | Age: 82
End: 2017-06-16

## 2017-06-16 NOTE — TELEPHONE ENCOUNTER
----- Message from Meera Vital sent at 6/16/2017  2:16 PM CDT -----  [facility] health    Elle   [phone]  Elle is calling to say she needs to extension on home health OT

## 2017-06-20 ENCOUNTER — INITIAL CONSULT (OUTPATIENT)
Dept: OPHTHALMOLOGY | Facility: CLINIC | Age: 82
End: 2017-06-20
Payer: MEDICARE

## 2017-06-20 VITALS — DIASTOLIC BLOOD PRESSURE: 72 MMHG | HEART RATE: 66 BPM | SYSTOLIC BLOOD PRESSURE: 145 MMHG

## 2017-06-20 DIAGNOSIS — H40.1132 PRIMARY OPEN-ANGLE GLAUCOMA, BILATERAL, MODERATE STAGE: ICD-10-CM

## 2017-06-20 DIAGNOSIS — H31.102: ICD-10-CM

## 2017-06-20 DIAGNOSIS — H35.3112 NONEXUDATIVE AGE-RELATED MACULAR DEGENERATION, RIGHT EYE, INTERMEDIATE DRY STAGE: Primary | ICD-10-CM

## 2017-06-20 PROCEDURE — 92225 PR SPECIAL EYE EXAM, INITIAL: CPT | Mod: RT,S$GLB,, | Performed by: OPHTHALMOLOGY

## 2017-06-20 PROCEDURE — 99999 PR PBB SHADOW E&M-EST. PATIENT-LVL IV: CPT | Mod: PBBFAC,,, | Performed by: OPHTHALMOLOGY

## 2017-06-20 PROCEDURE — 92004 COMPRE OPH EXAM NEW PT 1/>: CPT | Mod: S$GLB,,, | Performed by: OPHTHALMOLOGY

## 2017-06-20 PROCEDURE — 92134 CPTRZ OPH DX IMG PST SGM RTA: CPT | Mod: S$GLB,,, | Performed by: OPHTHALMOLOGY

## 2017-06-20 NOTE — LETTER
June 20, 2017      Ashley Johnson MD  [address]   [address]  [facility] [address]           [facility] - Ophthalmology  [address]  [place] [address]  Phone: [phone]  Fax: [phone]          Patient: Alyssa Hastings   MR Number: [id number]   YOB: 1930   Date of Visit: 6/20/2017       Dear Dr. Ashley Johnson:    Thank you for referring Alyssa Hastings to me for evaluation. Attached you will find relevant portions of my assessment and plan of care.    If you have questions, please do not hesitate to call me. I look forward to following Alyssa Hastings along with you.    Sincerely,    CRYSTAL Howell MD    Enclosure  CC:  No Recipients    If you would like to receive this communication electronically, please contact [email or web address] or [phone] to request more information on [employer] Link access.    For providers and/or their staff who would like to refer a patient to [facility], please contact us through our one-stop-shop provider referral line, [facility], at [phone].    If you feel you have received this communication in error or would no longer like to receive these types of communications, please e-mail [email or web address]

## 2017-06-20 NOTE — PROGRESS NOTES
Patient is here to establish eye care after moving from [place]. She was   being followed for AMD and glaucoma. She has had cataract sx in both eyes   [address] Dr Basurto. She had laser sx OS She is diabetic which is under   pretty good control.    Eye vitamins daily 3 tablets  Dorzolamide x 2 OS  Combigan x 2 OS  Lumigan qhs OU  Pataday daily OU    OCT - OD - [facility] - No SRF  OS macular atrophy      A/P    1. Dry AMD OD  AREDS/AG    2. Blind OS with atrophy  S/p sector PRP -?prior RVO  Also with pale nerve - ?AION  No NV today  Will obtain old records    3. POAG  On Combigan OU  Dorzolamide OS BID  Lumigan QHS OU    4. PCIOL OU        Retina 1 year OCT

## 2017-06-21 ENCOUNTER — DOCUMENTATION ONLY (OUTPATIENT)
Dept: FAMILY MEDICINE | Facility: CLINIC | Age: 82
End: 2017-06-21

## 2017-06-21 NOTE — PROGRESS NOTES
Patient seen today for 2 day history of burning during urination. Patient states symptoms are intermittent and made better when increasing hydration with water and cranberry juice. She is afebrile today (at [facility]). She does not have suprapubic pressure/cva tenderness.   i have ordered a urine culture and started cipro 250 mg BID for 3 days.   Plan communicated to nurse at [facility].

## 2017-06-27 ENCOUNTER — DOCUMENTATION ONLY (OUTPATIENT)
Dept: FAMILY MEDICINE | Facility: CLINIC | Age: 82
End: 2017-06-27

## 2017-06-27 RX ORDER — DEXTROMETHORPHAN HYDROBROMIDE, GUAIFENESIN 5; 100 MG/5ML; MG/5ML
650 LIQUID ORAL EVERY 8 HOURS PRN
Qty: 90 TABLET | Refills: 0 | Status: SHIPPED | OUTPATIENT
Start: 2017-06-27 | End: 2017-07-19

## 2017-06-30 RX ORDER — HYDRALAZINE HYDROCHLORIDE 50 MG/1
50 TABLET, FILM COATED ORAL 3 TIMES DAILY
Qty: 90 TABLET | Refills: 11 | Status: SHIPPED | OUTPATIENT
Start: 2017-06-30 | End: 2017-10-11 | Stop reason: ALTCHOICE

## 2017-07-03 RX ORDER — LEVOCETIRIZINE DIHYDROCHLORIDE 5 MG/1
TABLET, FILM COATED ORAL
Qty: 30 TABLET | Refills: 3 | Status: SHIPPED | OUTPATIENT
Start: 2017-07-03 | End: 2017-11-20 | Stop reason: SDUPTHER

## 2017-07-05 ENCOUNTER — EXTERNAL VISIT (OUTPATIENT)
Dept: FAMILY MEDICINE | Facility: CLINIC | Age: 82
End: 2017-07-05
Payer: MEDICARE

## 2017-07-05 VITALS — SYSTOLIC BLOOD PRESSURE: 168 MMHG | HEART RATE: 60 BPM | TEMPERATURE: 97 F | DIASTOLIC BLOOD PRESSURE: 80 MMHG

## 2017-07-05 DIAGNOSIS — J30.89 ALLERGIC RHINITIS DUE TO OTHER ALLERGIC TRIGGER, UNSPECIFIED RHINITIS SEASONALITY: ICD-10-CM

## 2017-07-05 DIAGNOSIS — J34.89 NASAL DRAINAGE: Primary | ICD-10-CM

## 2017-07-05 DIAGNOSIS — N39.41 URGE INCONTINENCE: ICD-10-CM

## 2017-07-05 PROCEDURE — 99214 OFFICE O/P EST MOD 30 MIN: CPT | Mod: S$GLB,,, | Performed by: FAMILY MEDICINE

## 2017-07-05 NOTE — PROGRESS NOTES
"Subjective:       Patient ID: Alyssa Hastings is a 86 y.o. female.    Chief Complaint: Allergies and Urinary Incontinence    HPI 86 year old female here for urinary incontinence and allergies. Patient has urinary incontinence. She states symptoms have been present for more than 10 years. She was tried on oxybutynin but states her BP was uncontrolled on this medication. Patient states she had a urological procedure done in [place] but she can not recall the name. She had it done in her early 30s. When living in [place] recently, patient states an electrical stimulation surgery was discussed but due to family obligations she was unable to go through with it. Patient states for last 3 months, she states symptoms have worsened. She has minimal time to get to the restroom and finds that she has complete incontinence. She wears depends and states that does not help contain the urine. She has had 2 vaginal births. When patient coughs/sneezes, she leaks urine. She states symptoms are worse at night.   She denies fevers/blood in urine.   Patient denies history of tobacco use.  Patient is requesting ENT referral as antihistamine/flonase is not helping with her runny nose. She states her nose "drips" clear liquid.   Review of Systems   Constitutional: Negative for chills and fever.   HENT: Positive for congestion and rhinorrhea.    Eyes: Positive for itching.   Respiratory: Negative for chest tightness and shortness of breath.    Cardiovascular: Negative for chest pain and leg swelling.   Gastrointestinal: Negative for abdominal pain, diarrhea, nausea and vomiting.   Genitourinary: Positive for frequency and urgency. Negative for dysuria and hematuria.       Objective:      Vitals:    07/05/17 0846   BP: (!) 168/80   Pulse: 60   Temp: 97 °F (36.1 °C)     Physical Exam   Constitutional: She is oriented to person, place, and time. She appears well-developed and well-nourished. No distress.   HENT:   Mouth/Throat: Oropharynx is " clear and moist. No oropharyngeal exudate.   Eyes: EOM are normal. Right eye exhibits no discharge. Left eye exhibits no discharge.   Cardiovascular: Normal rate and regular rhythm.    Murmur heard.  Pulmonary/Chest: Effort normal. She has no wheezes.   Abdominal: Soft. There is no tenderness.   Neurological: She is alert and oriented to person, place, and time.   Skin: She is not diaphoretic.   Psychiatric: She has a normal mood and affect. Her behavior is normal. Judgment and thought content normal.   Vitals reviewed.      Assessment:       1. Nasal drainage    2. Allergic rhinitis due to other allergic trigger, unspecified rhinitis seasonality    3. Urge incontinence        Plan:         1. Clear rhinorrhea, allergic rhinitis: not improving on antihistamine and steroid nasal spray. Will refer to ENT.  2. Urge incontinence, worsening: unable to tolerate oxybutynin and patient would like to discuss interventional procedures. Will refer to urology  3. HTN: uncontrolled. Patient has not received medication yet, will monitor. She states normally is well controlled.i have written orders to check daily BP after medication has been administered and I will review in 2 weeks.   4. RTC prn.   Nasal drainage  -     Ambulatory referral to ENT    Allergic rhinitis due to other allergic trigger, unspecified rhinitis seasonality  -     Ambulatory referral to ENT    Urge incontinence  -     Ambulatory referral to Urology      Return if symptoms worsen or fail to improve.

## 2017-07-11 ENCOUNTER — OFFICE VISIT (OUTPATIENT)
Dept: UROLOGY | Facility: CLINIC | Age: 82
End: 2017-07-11
Payer: MEDICARE

## 2017-07-11 VITALS
HEIGHT: 60 IN | BODY MASS INDEX: 32.39 KG/M2 | DIASTOLIC BLOOD PRESSURE: 63 MMHG | HEART RATE: 59 BPM | WEIGHT: 165 LBS | SYSTOLIC BLOOD PRESSURE: 156 MMHG

## 2017-07-11 DIAGNOSIS — N32.81 OAB (OVERACTIVE BLADDER): Primary | ICD-10-CM

## 2017-07-11 DIAGNOSIS — H40.1132 PRIMARY OPEN-ANGLE GLAUCOMA, BILATERAL, MODERATE STAGE: ICD-10-CM

## 2017-07-11 DIAGNOSIS — N39.3 SUI (STRESS URINARY INCONTINENCE, FEMALE): ICD-10-CM

## 2017-07-11 DIAGNOSIS — I10 ESSENTIAL HYPERTENSION: ICD-10-CM

## 2017-07-11 DIAGNOSIS — N18.30 CRF (CHRONIC RENAL FAILURE), STAGE 3 (MODERATE): ICD-10-CM

## 2017-07-11 PROCEDURE — 99204 OFFICE O/P NEW MOD 45 MIN: CPT | Mod: S$GLB,,, | Performed by: UROLOGY

## 2017-07-11 PROCEDURE — 1159F MED LIST DOCD IN RCRD: CPT | Mod: S$GLB,,, | Performed by: UROLOGY

## 2017-07-11 PROCEDURE — 1126F AMNT PAIN NOTED NONE PRSNT: CPT | Mod: S$GLB,,, | Performed by: UROLOGY

## 2017-07-11 PROCEDURE — 99999 PR PBB SHADOW E&M-EST. PATIENT-LVL IV: CPT | Mod: PBBFAC,,, | Performed by: UROLOGY

## 2017-07-11 PROCEDURE — 51798 US URINE CAPACITY MEASURE: CPT | Mod: S$GLB,,, | Performed by: UROLOGY

## 2017-07-11 PROCEDURE — 87086 URINE CULTURE/COLONY COUNT: CPT

## 2017-07-11 RX ORDER — SOLIFENACIN SUCCINATE 10 MG/1
10 TABLET, FILM COATED ORAL DAILY
Qty: 30 TABLET | Refills: 11 | Status: SHIPPED | OUTPATIENT
Start: 2017-07-11 | End: 2018-07-11

## 2017-07-11 NOTE — PROGRESS NOTES
HPI:  Alyssa Hastings is a 86 y.o. year old female that  presents with   Chief Complaint   Patient presents with    Medication Problem   .  This patient referred by her PCP for urge incontinence.  He states that this is been present for years.  She states that she was on oxybutynin years ago but developed a rash to it.  She has minimal leakage with coughing laughing and sneezing and she states that this is not her major problem.  Were bothers her is the sense of urgency and occasional urge incontinence.  She uses 3-4 pads per day.  She has no dysuria or gross hematuria.  He states that she had some type of bladder surgery in the 1970s to help with urinary leakage.  There is no family history of urological cancer.  Bladder scan postvoid residual the office today is minimal    Chart review shows no for PCP from last month showing glaucoma low thyroid and depression.  In May of dysuria microscopic urinalysis was negative and GFR was 34.  There are no urological related x-rays.      Past Medical History:   Diagnosis Date    Coronary artery disease     Diabetes mellitus     GERD (gastroesophageal reflux disease)     Glaucoma     Hypertension     Stroke     Thyroid disease      Social History     Social History    Marital status:      Spouse name: N/A    Number of children: N/A    Years of education: N/A     Occupational History    Not on file.     Social History Main Topics    Smoking status: Never Smoker    Smokeless tobacco: Never Used    Alcohol use No    Drug use: No    Sexual activity: No     Other Topics Concern    Not on file     Social History Narrative    No narrative on file     Past Surgical History:   Procedure Laterality Date    APPENDECTOMY      CARDIAC SURGERY      CHOLECYSTECTOMY      EYE SURGERY      HYSTERECTOMY       Family History   Problem Relation Age of Onset    Prostate cancer Neg Hx     Kidney disease Neg Hx            Review of Systems  The patient has no chest  pains.  The patient has no shortness of breath  Patient wears        glasses.  All other review of systems are negative.      Physical Exam:  BP (!) 156/63   Pulse (!) 59   Ht 5' (1.524 m)   Wt 74.8 kg (165 lb)   BMI 32.22 kg/m²   General appearance: alert, cooperative, no distress  Constitutional:Oriented to person, place, and time.appears well-developed and well-nourished.   HEENT: Normocephalic, atraumatic, neck symmetrical, no nasal discharge   Eyes: conjunctivae/corneas clear, PERRL, EOM's intact  Lungs: clear to auscultation bilaterally, no dullness to percussion bilaterally  Heart: regular rate and rhythm without rub; no displacement of the PMI   Abdomen: soft, non-tender; bowel sounds normoactive; no organomegaly  :Urethral meatus without lesion, no urethral masses noted, bladder nontender/nondistended, vagina normal appearing,   no      cystocele, anus/perineum without lesions, uterus surgically absent.  No leakage with cough  Extremities: extremities symmetric; no clubbing, cyanosis, or edema  Integument: Skin color, texture, turgor normal; no rashes; hair distrubution normal  Neurologic: Alert and oriented X 3, normal strength, normal coordination and gait  Psychiatric: no pressured speech; normal affect; no evidence of impaired cognition     LABS:    Complete Blood Count  Lab Results   Component Value Date    RBC 3.27 (L) 05/06/2017    HGB 9.8 (L) 05/06/2017    HCT 29.4 (L) 05/06/2017    MCV 90 05/06/2017    MCH 30.0 05/06/2017    MCHC 33.3 05/06/2017    RDW 14.3 05/06/2017     05/06/2017    MPV 8.8 (L) 05/06/2017    GRAN 4.4 05/06/2017    GRAN 72.8 05/06/2017    LYMPH 0.9 (L) 05/06/2017    LYMPH 15.5 (L) 05/06/2017    MONO 0.6 05/06/2017    MONO 10.5 05/06/2017    EOS 0.0 05/06/2017    BASO 0.01 05/06/2017    EOSINOPHIL 0.7 05/06/2017    BASOPHIL 0.2 05/06/2017    DIFFMETHOD Automated 05/06/2017       Comprehensive Metabolic Panel  Lab Results   Component Value Date     (H)  05/06/2017    BUN 19 05/06/2017    CREATININE 1.4 05/06/2017     (L) 05/06/2017    K 3.8 05/06/2017    CL 98 05/06/2017    PROT 6.7 05/06/2017    ALBUMIN 3.6 05/06/2017    BILITOT 0.3 05/06/2017    AST 72 (H) 05/06/2017    ALKPHOS 65 05/06/2017    CO2 24 05/06/2017     (H) 05/06/2017    ANIONGAP 9 05/06/2017    EGFRNONAA 34 (A) 05/06/2017    ESTGFRAFRICA 39 (A) 05/06/2017       PSA  No results found for: PSA      Assessment:    ICD-10-CM ICD-9-CM    1. OAB (overactive bladder) N32.81 596.51 Urine culture   2. Primary open-angle glaucoma, bilateral, moderate stage H40.1132 365.11      365.72    3. IAN (stress urinary incontinence, female) N39.3 625.6    4. Essential hypertension I10 401.9    5. CRF (chronic renal failure), stage 3 (moderate) N18.3 585.3 US Retroperitoneal Complete (Kidney and     The primary encounter diagnosis was OAB (overactive bladder). Diagnoses of Primary open-angle glaucoma, bilateral, moderate stage, IAN (stress urinary incontinence, female), Essential hypertension, and CRF (chronic renal failure), stage 3 (moderate) were also pertinent to this visit.      Plan: 1.  Overactive bladder.  I think it reasonable to try another anticholinergic.  Patient instructed to stop if she feels that she is having ALLERGIC reaction to this.  Scription written for Vesicare.  Patient understands that she cannot start the Vesicare until given the okay by her eye doctor.  Follow-up 2 months    #2.Glaucoma.  The chart  lists glaucoma as a diagnosis.  Patient understands that she cannot start the Vesicare until given the okay by her eye doctor    #3.  Stress incontinence.  Plan.  Not patient's primary problem and no intervention needed at this time    #4.   Elevated blood pressure.  Plan.  This finding pointed out to the patient.  I recommend that the patient follow up with the patient's PCP for this.    #5.  Chronic renal failure.  Plan.  This finding of her recent blood work pointed out the patient.   Will check renal ultrasound and contact the patient with any significant finding.  Recommend that she follow up with her PCP concerning her chronic renal failure  Orders Placed This Encounter   Procedures    Urine culture    US Retroperitoneal Complete (Kidney and           Luis GABRIELLE Callaway MD    PLEASE NOTE:  Please be advised that portions of this note were dictated using voice recognition software and may contain dictation related errors in spelling/grammar/appropriate pronouns/syntax or other errors that might have not been found and or corrected on text review.

## 2017-07-11 NOTE — LETTER
July 11, 2017      Ashley Johnson MD  [address]   [address]  [facility] [address]           [facility] - Urology  [address] [place] [address]  Phone: [phone]          Patient: Alyssa Hastings   MR Number: [id number]   YOB: 1930   Date of Visit: 7/11/2017       Dear Dr. Ashley Johnson:    Thank you for referring Alyssa Hastings to me for evaluation. Attached you will find relevant portions of my assessment and plan of care.    If you have questions, please do not hesitate to call me. I look forward to following Alyssa Hastings along with you.    Sincerely,    Luis Callaway MD    Enclosure  CC:  No Recipients    If you would like to receive this communication electronically, please contact [email or web address] or [phone] to request more information on [employer] Link access.    For providers and/or their staff who would like to refer a patient to [facility], please contact us through our one-stop-shop provider referral line, [facility], at [phone].    If you feel you have received this communication in error or would no longer like to receive these types of communications, please e-mail [email or web address]

## 2017-07-13 LAB — BACTERIA UR CULT: NORMAL

## 2017-07-15 ENCOUNTER — HOSPITAL ENCOUNTER (EMERGENCY)
Facility: HOSPITAL | Age: 82
Discharge: HOME | End: 2017-07-15
Attending: EMERGENCY MEDICINE
Payer: MEDICARE

## 2017-07-15 VITALS
RESPIRATION RATE: 16 BRPM | HEIGHT: 60 IN | TEMPERATURE: 98 F | BODY MASS INDEX: 33.38 KG/M2 | DIASTOLIC BLOOD PRESSURE: 70 MMHG | SYSTOLIC BLOOD PRESSURE: 142 MMHG | HEART RATE: 66 BPM | OXYGEN SATURATION: 96 % | WEIGHT: 170 LBS

## 2017-07-15 DIAGNOSIS — I10 ELEVATED BLOOD PRESSURE READING WITH DIAGNOSIS OF HYPERTENSION: Primary | ICD-10-CM

## 2017-07-15 DIAGNOSIS — I10 ELEVATED SYSTOLIC BLOOD PRESSURE READING WITH DIAGNOSIS OF HYPERTENSION: ICD-10-CM

## 2017-07-15 LAB
ALBUMIN SERPL BCP-MCNC: 3.4 G/DL
ALP SERPL-CCNC: 80 U/L
ALT SERPL W/O P-5'-P-CCNC: 108 U/L
ANION GAP SERPL CALC-SCNC: 9 MMOL/L
AST SERPL-CCNC: 108 U/L
BASOPHILS # BLD AUTO: 0.02 K/UL
BASOPHILS NFR BLD: 0.4 %
BILIRUB SERPL-MCNC: 0.3 MG/DL
BUN SERPL-MCNC: 14 MG/DL
CALCIUM SERPL-MCNC: 8.8 MG/DL
CHLORIDE SERPL-SCNC: 97 MMOL/L
CO2 SERPL-SCNC: 25 MMOL/L
CREAT SERPL-MCNC: 1.1 MG/DL
DIFFERENTIAL METHOD: ABNORMAL
EOSINOPHIL # BLD AUTO: 0.2 K/UL
EOSINOPHIL NFR BLD: 4.6 %
ERYTHROCYTE [DISTWIDTH] IN BLOOD BY AUTOMATED COUNT: 14.8 %
EST. GFR  (AFRICAN AMERICAN): 53 ML/MIN/1.73 M^2
EST. GFR  (NON AFRICAN AMERICAN): 46 ML/MIN/1.73 M^2
GLUCOSE SERPL-MCNC: 120 MG/DL
GLUCOSE SERPL-MCNC: 153 MG/DL (ref 70–110)
HCT VFR BLD AUTO: 28.1 %
HGB BLD-MCNC: 9.3 G/DL
LYMPHOCYTES # BLD AUTO: 0.8 K/UL
LYMPHOCYTES NFR BLD: 18 %
MCH RBC QN AUTO: 30.3 PG
MCHC RBC AUTO-ENTMCNC: 33.1 %
MCV RBC AUTO: 92 FL
MONOCYTES # BLD AUTO: 0.7 K/UL
MONOCYTES NFR BLD: 15 %
NEUTROPHILS # BLD AUTO: 2.8 K/UL
NEUTROPHILS NFR BLD: 61.8 %
PLATELET # BLD AUTO: 176 K/UL
PMV BLD AUTO: 8.5 FL
POCT GLUCOSE: 156 MG/DL (ref 70–110)
POTASSIUM SERPL-SCNC: 3.9 MMOL/L
PROT SERPL-MCNC: 6.5 G/DL
RBC # BLD AUTO: 3.07 M/UL
SODIUM SERPL-SCNC: 131 MMOL/L
WBC # BLD AUTO: 4.6 K/UL

## 2017-07-15 PROCEDURE — 85025 COMPLETE CBC W/AUTO DIFF WBC: CPT

## 2017-07-15 PROCEDURE — 93010 ELECTROCARDIOGRAM REPORT: CPT | Mod: ,,, | Performed by: INTERNAL MEDICINE

## 2017-07-15 PROCEDURE — 82962 GLUCOSE BLOOD TEST: CPT | Mod: 91

## 2017-07-15 PROCEDURE — 93005 ELECTROCARDIOGRAM TRACING: CPT

## 2017-07-15 PROCEDURE — 99284 EMERGENCY DEPT VISIT MOD MDM: CPT | Mod: 25

## 2017-07-15 PROCEDURE — 80053 COMPREHEN METABOLIC PANEL: CPT

## 2017-07-15 NOTE — ED TRIAGE NOTES
87 Y/O F with CC of neck pain. Pt reports started this AM with pain to the back of her neck. Displays good ROM of neck. Also reports blurred vision worse than usual which also started today. Believes is associated with HTN. Reports usually SBP <110. No other complaints verbalized. NAD noted. Will continue to monitor.

## 2017-07-15 NOTE — ED PROVIDER NOTES
Encounter Date: 7/15/2017       History     Chief Complaint   Patient presents with    Headache     blurred vision over last two days, elevated BP at home and was instructed by assisted living nurse to come to ER for evaluation     Alyssa Hastings, a 86 y.o. female, complains of elevated blood pressure today at her assisted living facility.  She said her blood pressure was 200/90.  She said she takes her blood pressure medication that same time every day and has been taking regularly.  She denies any headache or pain.  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  She has a history of glaucoma and is on 3 medications for this condition.  She denies any eye pain.  She said she took Tylenol arthritis medication twice today for arthritis in her neck.    Pain location: Neck and upper back pain  Pain Severity: Mild-to-moderate    Pain timing: Chronic pain with slight increased today  Pain character: Sharp    Associated with or Modified by: (see above)              Review of patient's allergies indicates:   Allergen Reactions    Actos [pioglitazone]     Amlodipine     Bentyl [dicyclomine]     Benzocaine     Declomycin [demeclocycline]     Doxycycline     Erythropoietin analogues     Gabapentin     Hydrocodone     Lipitor [atorvastatin]     Losartan     Metronidazole     Nortriptyline     Oxybutynin     Pcn [penicillins]     Potassium     Pravachol [pravastatin]     Procaine     Promethazine     Propranolol     Ramipril     Sulfa (sulfonamide antibiotics)      Past Medical History:   Diagnosis Date    Coronary artery disease     Diabetes mellitus     GERD (gastroesophageal reflux disease)     Glaucoma     Hypertension     Stroke     Thyroid disease      Past Surgical History:   Procedure Laterality Date    APPENDECTOMY      CARDIAC SURGERY      CHOLECYSTECTOMY      EYE SURGERY      HYSTERECTOMY       Family  History   Problem Relation Age of Onset    Prostate cancer Neg Hx     Kidney disease Neg Hx      Social History   Substance Use Topics    Smoking status: Never Smoker    Smokeless tobacco: Never Used    Alcohol use No     Review of Systems   Constitutional: Negative.    Eyes: Positive for visual disturbance.   Respiratory: Negative.    Cardiovascular: Negative.    Gastrointestinal: Negative.    Genitourinary: Negative.    Musculoskeletal: Positive for back pain and neck pain.   All other systems reviewed and are negative.      Physical Exam     Initial Vitals [07/15/17 1434]   BP Pulse Resp Temp SpO2   (!) 188/79 70 18 98.6 °F (37 °C) 100 %      MAP       115.33         Physical Exam    Nursing note and vitals reviewed.  Constitutional: She appears well-developed and well-nourished. She appears distressed.   Eyes: Conjunctivae and EOM are normal. Pupils are equal, round, and reactive to light.   Neck: Normal range of motion. Neck supple.   Cardiovascular: Normal rate, regular rhythm and normal heart sounds.   Pulmonary/Chest: Breath sounds normal.   Abdominal: Soft. There is no tenderness.   Musculoskeletal: Normal range of motion.   Neurological: She is alert and oriented to person, place, and time. She has normal strength.   Skin: Skin is warm and dry.   Psychiatric: She has a normal mood and affect. Her behavior is normal. Thought content normal.         ED Course   Procedures  Labs Reviewed   COMPREHENSIVE METABOLIC PANEL   CBC W/ AUTO DIFFERENTIAL     EKG Readings: (Independently Interpreted)   Initial Reading: No STEMI. Heart Rate: 68. Ectopy: No Ectopy. Conduction: Normal. Axis: Left Axis Deviation. Other Impression: anterior infarct undetermined age; no acute changes          Medical Decision Making:   Initial Assessment:    86 y.o. female, complains of elevated blood pressure today at her assisted living facility.  She said her blood pressure was 200/90.  She said she takes her blood pressure  medication that same time every day and has been taking regularly.  She denies any headache or pain.  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  She has a history of glaucoma and is on 3 medications for this condition.  She denies any eye pain.  She said she took Tylenol arthritis medication twice today for arthritis in her neck.    Pain location: Neck and upper back pain  PE: No acute distress; unremarkable physical exam  Differential Diagnosis:   Elevated blood pressure with history of hypertension,  Clinical Tests:   Lab Tests: Ordered and Reviewed  The following lab test(s) were unremarkable: CMP       <> Summary of Lab: Mild anemia  ED Management:  Cardiogram was unremarkable.  The patient's elevated blood pressure normalized without treatment.  The patient is on adequate medication for her glaucoma and her hypertension and will be maintained on same medication.  She improved without treatment and will be discharged to follow-up with her primary care physician this week.                   ED Course     Clinical Impression:   The primary encounter diagnosis was Elevated blood pressure reading with diagnosis of hypertension. A diagnosis of Elevated systolic blood pressure reading with diagnosis of hypertension was also pertinent to this visit.                           Francisco Walker Jr., MD  07/15/17 3174

## 2017-07-16 LAB — POCT GLUCOSE: 153 MG/DL (ref 70–110)

## 2017-07-19 ENCOUNTER — EXTERNAL VISIT (OUTPATIENT)
Dept: FAMILY MEDICINE | Facility: CLINIC | Age: 82
End: 2017-07-19
Payer: MEDICARE

## 2017-07-19 VITALS
HEART RATE: 61 BPM | RESPIRATION RATE: 20 BRPM | BODY MASS INDEX: 33.98 KG/M2 | SYSTOLIC BLOOD PRESSURE: 165 MMHG | TEMPERATURE: 97 F | WEIGHT: 174 LBS | DIASTOLIC BLOOD PRESSURE: 80 MMHG

## 2017-07-19 DIAGNOSIS — K59.00 CONSTIPATION, UNSPECIFIED CONSTIPATION TYPE: ICD-10-CM

## 2017-07-19 DIAGNOSIS — I25.2 HISTORY OF MI (MYOCARDIAL INFARCTION): ICD-10-CM

## 2017-07-19 DIAGNOSIS — N18.30 CKD (CHRONIC KIDNEY DISEASE) STAGE 3, GFR 30-59 ML/MIN: Primary | ICD-10-CM

## 2017-07-19 DIAGNOSIS — R74.8 ELEVATED LIVER ENZYMES: ICD-10-CM

## 2017-07-19 DIAGNOSIS — Z95.1 HISTORY OF CORONARY ARTERY BYPASS GRAFT X 2: ICD-10-CM

## 2017-07-19 DIAGNOSIS — I25.10 ATHEROSCLEROSIS OF CORONARY ARTERY, ANGINA PRESENCE UNSPECIFIED, UNSPECIFIED VESSEL OR LESION TYPE, UNSPECIFIED WHETHER NATIVE OR TRANSPLANTED HEART: ICD-10-CM

## 2017-07-19 PROCEDURE — 99214 OFFICE O/P EST MOD 30 MIN: CPT | Mod: S$GLB,,, | Performed by: FAMILY MEDICINE

## 2017-07-19 RX ORDER — DEXTROMETHORPHAN HYDROBROMIDE, GUAIFENESIN 5; 100 MG/5ML; MG/5ML
650 LIQUID ORAL EVERY 12 HOURS PRN
Qty: 60 TABLET | Refills: 0 | Status: SHIPPED | OUTPATIENT
Start: 2017-07-19 | End: 2017-08-25 | Stop reason: SDUPTHER

## 2017-07-19 RX ORDER — BISACODYL 10 MG
10 SUPPOSITORY, RECTAL RECTAL
Qty: 15 SUPPOSITORY | Refills: 0 | Status: SHIPPED | OUTPATIENT
Start: 2017-07-19 | End: 2017-08-04 | Stop reason: SDUPTHER

## 2017-07-19 RX ORDER — LISINOPRIL 40 MG/1
40 TABLET ORAL DAILY
Qty: 30 TABLET | Refills: 0 | Status: SHIPPED | OUTPATIENT
Start: 2017-07-19 | End: 2017-11-16

## 2017-07-19 NOTE — PROGRESS NOTES
Subjective:       Patient ID: Alyssa Hastings is a 86 y.o. female.    Chief Complaint: Constipation; Back Pain; and Follow-up     HPI   Constipation for 5 days. She is tking fiber without relief. She is requqesting laxative. She usually has a bowel movement every 3 days.   Pain control for lower back. Patient has been taking acetaminophen tid prn and tramadol at night for pain relief. She uses heating pads and aspercreme with temporary relief. Patient states back pain is worse as she is constipated. She states pain ranges 5-8/10 and is worse when rising out of a sitting position.   Patient recently saw urology for overactive bladder. She was prescribed vesicare but was advised to f/u with ophtho prior as she has glaucoma.       Review of Systems   Constitutional: Negative for chills and fever.   Respiratory: Negative for chest tightness and shortness of breath.    Cardiovascular: Negative for chest pain and leg swelling.   Gastrointestinal: Positive for abdominal pain and constipation. Negative for diarrhea and vomiting.   Genitourinary: Negative for dysuria.   Musculoskeletal: Positive for arthralgias and back pain.       Objective:      Vitals:    07/19/17 0809   BP: (!) 165/80   Pulse: 61   Resp: 20   Temp: 97 °F (36.1 °C)     Physical Exam   Constitutional: She appears well-developed and well-nourished. No distress.   HENT:   Mouth/Throat: Oropharynx is clear and moist. No oropharyngeal exudate.   Cardiovascular: Normal rate and regular rhythm.    Murmur heard.  Pulmonary/Chest: Effort normal. She has no wheezes.   Abdominal: Soft.   Musculoskeletal:        Lumbar back: She exhibits decreased range of motion and tenderness. She exhibits no bony tenderness.        Back:    Skin: She is not diaphoretic.   Vitals reviewed.      Assessment:       1. CKD (chronic kidney disease) stage 3, GFR 30-59 ml/min    2. Constipation, unspecified constipation type    3. Atherosclerosis of coronary artery, angina presence  unspecified, unspecified vessel or lesion type, unspecified whether native or transplanted heart    4. History of coronary artery bypass graft x 2    5. History of MI (myocardial infarction)    6. Elevated liver enzymes        Plan:         1. Constipation: will order laxative. Increase fiber in diet and water intake.   2. Back pain: due to elevated LFTS will decrease acetaminophen to twice daily prn. Patient advised to ambulate as tolerated and to inform me if pain is uncontrolled. Will get xray at next visit if symptoms persist  3. History of bypass, stent placement, MI-will refer to cards. maría elena has multiple allergies and BP has been difficult to control. Will increase lisinopril to 40 mg daily and check cmp  4. CKD : will get labs and refer to nephrology. Avoid nsaids  5. F/u in 2 weeks on bp, labs.   CKD (chronic kidney disease) stage 3, GFR 30-59 ml/min  -     SODIUM, URINE, RANDOM  -     CREATININE, URINE, RANDOM  -     Comprehensive metabolic panel; Future; Expected date: 07/19/2017  -     PTH, intact; Future; Expected date: 07/19/2017  -     Ambulatory referral to Nephrology    Constipation, unspecified constipation type    Atherosclerosis of coronary artery, angina presence unspecified, unspecified vessel or lesion type, unspecified whether native or transplanted heart  -     Ambulatory referral to Cardiology    History of coronary artery bypass graft x 2  -     Ambulatory referral to Cardiology    History of MI (myocardial infarction)  -     Ambulatory referral to Cardiology    Elevated liver enzymes    Other orders  -     bisacodyl (LAXATIVE, BISACODYL,) 10 mg Supp; Place 1 suppository (10 mg total) rectally every 7 days. Every 7 days prn constipation  Dispense: 15 suppository; Refill: 0  -     lisinopril (PRINIVIL,ZESTRIL) 40 MG tablet; Take 1 tablet (40 mg total) by mouth once daily.  Dispense: 30 tablet; Refill: 0  -     acetaminophen (TYLENOL) 650 MG TbSR; Take 1 tablet (650 mg total) by mouth  every 12 (twelve) hours as needed (start with daily prn).  Dispense: 60 tablet; Refill: 0      Return in about 2 weeks (around 8/2/2017).

## 2017-07-20 ENCOUNTER — TELEPHONE (OUTPATIENT)
Dept: FAMILY MEDICINE | Facility: CLINIC | Age: 82
End: 2017-07-20

## 2017-07-20 NOTE — TELEPHONE ENCOUNTER
Routed copy of pt's notes from yesterday to Dian at [facility] fax # [phone].

## 2017-07-20 NOTE — TELEPHONE ENCOUNTER
----- Message from Suad Viramontes sent at 7/20/2017  1:34 PM CDT -----  Contact: Dian [phone]  Dian with [facility] is calling to get a copy of notes for yesterday's visit. Please fax to [phone].

## 2017-07-21 ENCOUNTER — LAB VISIT (OUTPATIENT)
Dept: LAB | Facility: HOSPITAL | Age: 82
End: 2017-07-21
Attending: FAMILY MEDICINE
Payer: MEDICARE

## 2017-07-21 DIAGNOSIS — N18.30 CKD (CHRONIC KIDNEY DISEASE) STAGE 3, GFR 30-59 ML/MIN: ICD-10-CM

## 2017-07-21 LAB
ALBUMIN SERPL BCP-MCNC: 3.3 G/DL
ALP SERPL-CCNC: 79 U/L
ALT SERPL W/O P-5'-P-CCNC: 110 U/L
ANION GAP SERPL CALC-SCNC: 8 MMOL/L
AST SERPL-CCNC: 116 U/L
BILIRUB SERPL-MCNC: 0.5 MG/DL
BUN SERPL-MCNC: 17 MG/DL
CALCIUM SERPL-MCNC: 8.8 MG/DL
CHLORIDE SERPL-SCNC: 98 MMOL/L
CO2 SERPL-SCNC: 25 MMOL/L
CREAT SERPL-MCNC: 1.2 MG/DL
EST. GFR  (AFRICAN AMERICAN): 47 ML/MIN/1.73 M^2
EST. GFR  (NON AFRICAN AMERICAN): 41 ML/MIN/1.73 M^2
GLUCOSE SERPL-MCNC: 110 MG/DL
POTASSIUM SERPL-SCNC: 4.3 MMOL/L
PROT SERPL-MCNC: 6.4 G/DL
PTH-INTACT SERPL-MCNC: 55 PG/ML
SODIUM SERPL-SCNC: 131 MMOL/L

## 2017-07-21 PROCEDURE — 36415 COLL VENOUS BLD VENIPUNCTURE: CPT

## 2017-07-21 PROCEDURE — 80053 COMPREHEN METABOLIC PANEL: CPT

## 2017-07-21 PROCEDURE — 83970 ASSAY OF PARATHORMONE: CPT

## 2017-08-01 ENCOUNTER — INITIAL CONSULT (OUTPATIENT)
Dept: OPHTHALMOLOGY | Facility: CLINIC | Age: 82
End: 2017-08-01
Payer: MEDICARE

## 2017-08-01 ENCOUNTER — CLINICAL SUPPORT (OUTPATIENT)
Dept: OPHTHALMOLOGY | Facility: CLINIC | Age: 82
End: 2017-08-01
Payer: MEDICARE

## 2017-08-01 DIAGNOSIS — H35.30 MACULAR DEGENERATION OF BOTH EYES: ICD-10-CM

## 2017-08-01 DIAGNOSIS — H54.40 BLINDNESS OF LEFT EYE: ICD-10-CM

## 2017-08-01 DIAGNOSIS — H47.20 OPTIC ATROPHY: ICD-10-CM

## 2017-08-01 DIAGNOSIS — Z96.1 STATUS POST CATARACT EXTRACTION AND INSERTION OF INTRAOCULAR LENS, UNSPECIFIED LATERALITY: ICD-10-CM

## 2017-08-01 DIAGNOSIS — H40.1132 PRIMARY OPEN-ANGLE GLAUCOMA, BILATERAL, MODERATE STAGE: Primary | ICD-10-CM

## 2017-08-01 DIAGNOSIS — H31.102: ICD-10-CM

## 2017-08-01 DIAGNOSIS — H35.3112 NONEXUDATIVE AGE-RELATED MACULAR DEGENERATION, RIGHT EYE, INTERMEDIATE DRY STAGE: ICD-10-CM

## 2017-08-01 DIAGNOSIS — Z98.49 STATUS POST CATARACT EXTRACTION AND INSERTION OF INTRAOCULAR LENS, UNSPECIFIED LATERALITY: ICD-10-CM

## 2017-08-01 DIAGNOSIS — H40.1133 PRIMARY OPEN ANGLE GLAUCOMA OF BOTH EYES, SEVERE STAGE: Primary | ICD-10-CM

## 2017-08-01 DIAGNOSIS — H34.8392 BRANCH RETINAL VEIN OCCLUSION: ICD-10-CM

## 2017-08-01 PROCEDURE — 92250 FUNDUS PHOTOGRAPHY W/I&R: CPT | Mod: S$GLB,,, | Performed by: OPHTHALMOLOGY

## 2017-08-01 PROCEDURE — 99999 PR PBB SHADOW E&M-EST. PATIENT-LVL II: CPT | Mod: PBBFAC,,, | Performed by: OPHTHALMOLOGY

## 2017-08-01 PROCEDURE — 92014 COMPRE OPH EXAM EST PT 1/>: CPT | Mod: S$GLB,,, | Performed by: OPHTHALMOLOGY

## 2017-08-01 PROCEDURE — 99999 PR PBB SHADOW E&M-EST. PATIENT-LVL I: CPT | Mod: PBBFAC,,,

## 2017-08-01 PROCEDURE — 92083 EXTENDED VISUAL FIELD XM: CPT | Mod: S$GLB,,, | Performed by: OPHTHALMOLOGY

## 2017-08-01 PROCEDURE — 92020 GONIOSCOPY: CPT | Mod: S$GLB,,, | Performed by: OPHTHALMOLOGY

## 2017-08-01 PROCEDURE — 76514 ECHO EXAM OF EYE THICKNESS: CPT | Mod: S$GLB,,, | Performed by: OPHTHALMOLOGY

## 2017-08-01 RX ORDER — DORZOLAMIDE HCL 20 MG/ML
1 SOLUTION/ DROPS OPHTHALMIC 2 TIMES DAILY
Qty: 10 ML | Refills: 6 | Status: SHIPPED | OUTPATIENT
Start: 2017-08-01

## 2017-08-01 RX ORDER — OLOPATADINE HYDROCHLORIDE 2 MG/ML
1 SOLUTION/ DROPS OPHTHALMIC DAILY
Qty: 2.5 ML | Refills: 6 | Status: SHIPPED | OUTPATIENT
Start: 2017-08-01 | End: 2018-08-01

## 2017-08-01 RX ORDER — BRIMONIDINE TARTRATE AND TIMOLOL MALEATE 2; 5 MG/ML; MG/ML
1 SOLUTION OPHTHALMIC 2 TIMES DAILY
Qty: 10 ML | Refills: 6 | Status: SHIPPED | OUTPATIENT
Start: 2017-08-01

## 2017-08-01 RX ORDER — BIMATOPROST 0.3 MG/ML
1 SOLUTION/ DROPS OPHTHALMIC NIGHTLY
Qty: 2.5 ML | Refills: 6 | Status: SHIPPED | OUTPATIENT
Start: 2017-08-01

## 2017-08-01 NOTE — PROGRESS NOTES
HPI     Glaucoma    Additional comments: Glaucoma Eval- Dr. Howell           Comments   She states she has been followed for Glaucoma and ARMD OU in [place](son lives there)  1. Dry AMD OD    2. Blind OS with atrophy  S/p sector PRP -?prior RVO  Also with pale nerve - ?AION     3. POAG     4. PCIOL OU    Dorzolamide x 2 OS   Combigan x 2 OS   Lumigan qhs OU   Pataday daily OU        Last edited by Jeremiah Iraheta on 8/1/2017  2:16 PM. (History)            Assessment /Plan     For exam results, see Encounter Report.    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 Old HRVO OS  ? NA-AION OS  + Sup PRP  Pale Nerve OS      CCT  [phone]    Mid-low teens    Both eyes  Dorz BID  Combigan BID  Lumigan q day  Pataday q day    Right eye  SP [facility] [place] @ 2004    PC IOL OU  Observe    Bilateral ARMD  Dry  Dr Howell follows    Plan  RTC 4 months IOP and OCT RNFL  Sooner prn with good understanding

## 2017-08-02 ENCOUNTER — DOCUMENTATION ONLY (OUTPATIENT)
Dept: FAMILY MEDICINE | Facility: CLINIC | Age: 82
End: 2017-08-02

## 2017-08-02 ENCOUNTER — EXTERNAL VISIT (OUTPATIENT)
Dept: FAMILY MEDICINE | Facility: CLINIC | Age: 82
End: 2017-08-02
Payer: MEDICARE

## 2017-08-02 VITALS
DIASTOLIC BLOOD PRESSURE: 60 MMHG | SYSTOLIC BLOOD PRESSURE: 120 MMHG | HEART RATE: 69 BPM | BODY MASS INDEX: 32.22 KG/M2 | WEIGHT: 165 LBS | RESPIRATION RATE: 23 BRPM

## 2017-08-02 DIAGNOSIS — K59.00 CONSTIPATION, UNSPECIFIED CONSTIPATION TYPE: ICD-10-CM

## 2017-08-02 DIAGNOSIS — I10 ESSENTIAL HYPERTENSION: Primary | ICD-10-CM

## 2017-08-02 PROCEDURE — 99213 OFFICE O/P EST LOW 20 MIN: CPT | Mod: S$GLB,,, | Performed by: FAMILY MEDICINE

## 2017-08-02 NOTE — PROGRESS NOTES
Dr. Johnson progress note for today routed via [address] fax [phone] to Dian at [facility].

## 2017-08-02 NOTE — PROGRESS NOTES
Subjective:       Patient ID: Alyssa Hastings is a 86 y.o. female.    Chief Complaint: Follow-up    HPI   86 year old female seen at [facility] seen for follow up.   Patient states she wants to use a laxative ffor the days she has constipation. She takes metamucil daily. She has bowel movements every 5-6 days.   Patient is requesting taking some of her medication at night.   Review of Systems   Constitutional: Negative for chills and fever.   Respiratory: Negative for chest tightness and shortness of breath.    Cardiovascular: Negative for chest pain and leg swelling.   Gastrointestinal: Positive for constipation. Negative for abdominal pain, diarrhea, nausea and vomiting.       Objective:      Vitals:    08/02/17 0911   BP: 120/60   Pulse:    Resp:      Physical Exam   Constitutional: She appears well-developed and well-nourished. No distress.   Cardiovascular: Normal rate.    Pulmonary/Chest: Effort normal.   Abdominal: Soft. There is no tenderness. There is no guarding.   Skin: She is not diaphoretic.   Psychiatric: She has a normal mood and affect. Her behavior is normal. Judgment and thought content normal.   Vitals reviewed.      Assessment:       1. Essential hypertension    2. Constipation, unspecified constipation type        Plan:         1. Htn: well controlled.  2. Constipation: daily metamucil, patient advised to increase water intake. And may use dulcolax prn constipation. Decrease tramadol usag.e patient has elevated LFTs, so I have advised on decreasing tylenol to once daily prn.   Essential hypertension    Constipation, unspecified constipation type      Return if symptoms worsen or fail to improve.

## 2017-08-03 ENCOUNTER — OFFICE VISIT (OUTPATIENT)
Dept: OTOLARYNGOLOGY | Facility: CLINIC | Age: 82
End: 2017-08-03
Payer: MEDICARE

## 2017-08-03 VITALS
TEMPERATURE: 97 F | HEIGHT: 60 IN | HEART RATE: 64 BPM | SYSTOLIC BLOOD PRESSURE: 130 MMHG | BODY MASS INDEX: 33.55 KG/M2 | WEIGHT: 170.88 LBS | DIASTOLIC BLOOD PRESSURE: 55 MMHG

## 2017-08-03 DIAGNOSIS — J34.2 NASAL SEPTAL DEVIATION: ICD-10-CM

## 2017-08-03 DIAGNOSIS — B96.89 ACUTE BACTERIAL SINUSITIS: Primary | ICD-10-CM

## 2017-08-03 DIAGNOSIS — J01.90 ACUTE BACTERIAL SINUSITIS: Primary | ICD-10-CM

## 2017-08-03 DIAGNOSIS — J30.0 CHRONIC VASOMOTOR RHINITIS: ICD-10-CM

## 2017-08-03 DIAGNOSIS — J34.3 HYPERTROPHY OF INFERIOR NASAL TURBINATE: ICD-10-CM

## 2017-08-03 PROCEDURE — 1126F AMNT PAIN NOTED NONE PRSNT: CPT | Mod: S$GLB,,, | Performed by: OTOLARYNGOLOGY

## 2017-08-03 PROCEDURE — 99999 PR PBB SHADOW E&M-EST. PATIENT-LVL III: CPT | Mod: PBBFAC,,, | Performed by: OTOLARYNGOLOGY

## 2017-08-03 PROCEDURE — 1159F MED LIST DOCD IN RCRD: CPT | Mod: S$GLB,,, | Performed by: OTOLARYNGOLOGY

## 2017-08-03 PROCEDURE — 99204 OFFICE O/P NEW MOD 45 MIN: CPT | Mod: S$GLB,,, | Performed by: OTOLARYNGOLOGY

## 2017-08-03 RX ORDER — AZITHROMYCIN 250 MG/1
TABLET, FILM COATED ORAL
Qty: 6 TABLET | Refills: 0 | Status: ON HOLD | OUTPATIENT
Start: 2017-08-03 | End: 2017-09-03 | Stop reason: CLARIF

## 2017-08-03 NOTE — LETTER
August 3, 2017      Ashley Johnson MD  [address]   [address]  [facility] [address]           [facility] O[facility]hin[facility]ology  [address] [place] [address] [address], [address]  [facility] [address]  Phone: [phone]  Fax: [phone]          Patient: Alyssa Hastings   MR Number: [id number]   YOB: 1930   Date of Visit: 8/3/2017       Dear Dr. Ashley Johnson:    Thank you for referring Alyssa Hastings to me for evaluation. Attached you will find relevant portions of my assessment and plan of care.    If you have questions, please do not hesitate to call me. I look forward to following Alyssa Hastings along with you.    Sincerely,    Abby Lerma MD    Enclosure  CC:  No Recipients    If you would like to receive this communication electronically, please contact [email or web address] or [phone] to request more information on "[employer] Link access.    For providers and/or their staff who would like to refer a patient to [facility], please contact us through our one-stop-shop provider referral line, [facility], at [phone].    If you feel you have received this communication in error or would no longer like to receive these types of communications, please e-mail [email or web address]

## 2017-08-03 NOTE — PROGRESS NOTES
Chief Complaint   Patient presents with    Nasal Congestion    Allergies    Cerumen Impaction     bilateral   .    HPI:     Alyssa Hastings is a 86 y.o. female who presents for evaluation of a several day history of nasal congestion and postnasal drip. She states that she has had rhinorrhea that is brought on when she eats or drinks.  She has not been able to correlate it to hot or cold liquids.  She states this has been going on for 3 months. She has been on Atrovent nasal spray for several years by her ENT physician in [place]. There is bilateral nasal obstruction. She does use sinus rinses or nasal sprays. She admits to right midface pain and pressure.  She admits to rhinorrhea and postnasal drip. There is not maxillary tooth pain.  She has had sinus or nasal surgery. There is no history of sinonasal trauma.      Past Medical History:   Diagnosis Date    Coronary artery disease     Diabetes mellitus     GERD (gastroesophageal reflux disease)     Glaucoma     Hypertension     Stroke     Thyroid disease      Social History     Social History    Marital status:      Spouse name: N/A    Number of children: N/A    Years of education: N/A     Occupational History    Not on file.     Social History Main Topics    Smoking status: Never Smoker    Smokeless tobacco: Never Used    Alcohol use No    Drug use: No    Sexual activity: No     Other Topics Concern    Not on file     Social History Narrative    No narrative on file     Past Surgical History:   Procedure Laterality Date    APPENDECTOMY      CARDIAC SURGERY      CHOLECYSTECTOMY      EYE SURGERY      HYSTERECTOMY       Family History   Problem Relation Age of Onset    Prostate cancer Neg Hx     Kidney disease Neg Hx            Review of Systems  General: negative for chills, fever or weight loss  Psychological: negative for mood changes or depression  Ophthalmic: negative for blurry vision, photophobia or eye pain  ENT: see  HPI  Respiratory: no cough, shortness of breath, or wheezing  Cardiovascular: no chest pain or dyspnea on exertion  Gastrointestinal: no abdominal pain, change in bowel habits, or black/ bloody stools  Musculoskeletal: negative for gait disturbance or muscular weakness  Neurological: no syncope or seizures; no ataxia  Dermatological: negative for puritis,  rash and jaundice  Hematologic/lymphatic: no easy bruising, no new lumps or bumps      Physical Exam:    Vitals:    08/03/17 1322   BP: (!) 130/55   Pulse: 64   Temp: 96.7 °F (35.9 °C)       Constitutional: Well appearing / communicating without difficutly.  NAD.  Eyes: EOM I Bilaterally  Head/Face: Normocephalic.  Negative paranasal sinus pressure/tenderness.  Salivary glands WNL.  House Brackmann I Bilaterally.    Right Ear: Auricle normal appearance. External Auditory Canal within normal limits,TM w/o masses/lesions/perforations. TM mobility noted.   Left Ear: Auricle normal appearance. External Auditory Canal WNL,TM w/o masses/lesions/perforations. TM mobility noted.  Nose: No gross nasal septal deviation. Inferior Turbinates edematous 3+ bilaterally. No septal perforation. No masses/lesions. External nasal skin appears normal without masses/lesions.  Oral Cavity: Gingiva/lips within normal limits.  Dentition/gingiva healthy appearing. Mucus membranes moist. Floor of mouth soft, no masses palpated. Oral Tongue mobile. Hard Palate appears normal.    Oropharynx: Base of tongue appears normal. No masses/lesions noted. Tonsillar fossa/pharyngeal wall without lesions. Posterior oropharynx WNL.  Soft palate without masses. Midline uvula.   Neck/Lymphatic: No LAD I-VI bilaterally.  No thyromegaly.  No masses noted on exam.    Mirror laryngoscopy/nasopharyngoscopy: Active gag reflex.  Unable to perform.    Neuro/Psychiatric: AOx3.  Normal mood and affect.   Cardiovascular: Normal carotid pulses bilaterally, no increasing jugular venous distention noted at cervical  region bilaterally.    Respiratory: Normal respiratory effort, no stridor, no retractions noted.        Assessment:    ICD-10-CM ICD-9-CM    1. Acute bacterial sinusitis J01.90 461.9     B96.89     2. Chronic vasomotor rhinitis J30.0 477.9    3. Hypertrophy of inferior nasal turbinate J34.3 478.0    4. Nasal septal deviation J34.2 470      The primary encounter diagnosis was Acute bacterial sinusitis. Diagnoses of Chronic vasomotor rhinitis, Hypertrophy of inferior nasal turbinate, and Nasal septal deviation were also pertinent to this visit.      Plan:  No orders of the defined types were placed in this encounter.    Obtain records from her ENT in [place], [place].      Nonallergic rhinitis is a common condition characterized by the chronic presence of onf nasal congestion (stuffiness), rhinorrhea, and postnasal drainage. Clinically, it is distinguished from allergic rhinitis by the the fact that it has onset at a later age and absence of nasal and ocular itching and prominent sneezing.  Typical triggers in nonallergic rhinitis include irritant odors and strong fragrances, such as tobacco smoke, perfumes, diesel and car exhaust (ie, patients become congested when sitting in traffic), cleaning products, newsprint, changes in temperature, and alcoholic beverages. Subtypes of nonallergic rhinitis include Vasomotor rhinitis, which is characterized by intermittent symptoms of congestion (stuffiness) and/or watery nasal discharge, and an exaggerated reaction to nonspecific irritants, such as air pollution or temperature changes, especially exposure to cold, dry air and gustatory rhinitis, which is an episodic condition with prominent watery rhinorrhea triggered most often by hot or spicy foods and is caused by a vagally-mediated reflex.  Treatment options primarily involve intranasal medications.  Will start the patient on Atrovent 2 sprays per nostril twice daily.    Acute non-recurrent sinusitis- she  will begin a course of    Zithromax, and continue Atrovent nasal. she will begin nasal saline irrigations minimally twice daily.  She understands that she is to notify me for persistent symptoms or worsening of symptoms.  .      Abby Lerma MD

## 2017-08-04 RX ORDER — BISACODYL 10 MG
10 SUPPOSITORY, RECTAL RECTAL
Qty: 15 SUPPOSITORY | Refills: 0 | Status: SHIPPED | OUTPATIENT
Start: 2017-08-04 | End: 2017-08-16

## 2017-08-16 DIAGNOSIS — F32.A DEPRESSION, UNSPECIFIED DEPRESSION TYPE: Primary | ICD-10-CM

## 2017-08-16 RX ORDER — POLYETHYLENE GLYCOL 3350 17 G/17G
17 POWDER, FOR SOLUTION ORAL DAILY
Qty: 238 G | Refills: 0 | Status: SHIPPED | OUTPATIENT
Start: 2017-08-16 | End: 2017-08-23

## 2017-08-23 ENCOUNTER — TELEPHONE (OUTPATIENT)
Dept: FAMILY MEDICINE | Facility: CLINIC | Age: 82
End: 2017-08-23

## 2017-08-23 NOTE — TELEPHONE ENCOUNTER
Pt complains of abdomen pain, upper mid, family & pt requesting to see a GI doctor today, rates pain 7/10, last bowel movement was Sunday; took Tylenol 650mg this am, please advise.

## 2017-08-25 RX ORDER — DEXTROMETHORPHAN HYDROBROMIDE, GUAIFENESIN 5; 100 MG/5ML; MG/5ML
650 LIQUID ORAL EVERY 12 HOURS PRN
Qty: 60 TABLET | Refills: 1 | Status: SHIPPED | OUTPATIENT
Start: 2017-08-25

## 2017-08-25 NOTE — TELEPHONE ENCOUNTER
----- Message from Sabrina Heaton sent at 8/25/2017  2:12 PM CDT -----  Contact: [facility].[phone]  Out of arthritis med's.resident of [facility].

## 2017-08-30 ENCOUNTER — DOCUMENTATION ONLY (OUTPATIENT)
Dept: FAMILY MEDICINE | Facility: CLINIC | Age: 82
End: 2017-08-30

## 2017-08-30 ENCOUNTER — EXTERNAL VISIT (OUTPATIENT)
Dept: FAMILY MEDICINE | Facility: CLINIC | Age: 82
End: 2017-08-30
Payer: MEDICARE

## 2017-08-30 VITALS
BODY MASS INDEX: 32.42 KG/M2 | DIASTOLIC BLOOD PRESSURE: 64 MMHG | SYSTOLIC BLOOD PRESSURE: 135 MMHG | HEART RATE: 74 BPM | WEIGHT: 166 LBS

## 2017-08-30 DIAGNOSIS — K59.00 CONSTIPATION, UNSPECIFIED CONSTIPATION TYPE: Primary | ICD-10-CM

## 2017-08-30 PROCEDURE — 99348 HOME/RES VST EST LOW MDM 30: CPT | Mod: ,,, | Performed by: FAMILY MEDICINE

## 2017-08-30 RX ORDER — DOCUSATE SODIUM 100 MG/1
100 CAPSULE, LIQUID FILLED ORAL 2 TIMES DAILY PRN
Qty: 60 CAPSULE | Refills: 0 | Status: SHIPPED | OUTPATIENT
Start: 2017-08-30

## 2017-08-30 NOTE — PATIENT INSTRUCTIONS
Constipation (Adult)  Constipation means that you have bowel movements that are less frequent than usual. Stools often become very hard and difficult to pass.  Constipation is very common. At some point in life it affects almost everyone. Since everyone's bowel habits are different, what is constipation to one person may not be to another. Your healthcare provider may do tests to diagnose constipation. It depends on what he or she finds when evaluating you.    Symptoms of constipation include:  · Abdominal pain  · Bloating  · Vomiting  · Painful bowel movements  · Itching, swelling, bleeding, or pain around the anus  Causes  Constipation can have many causes. These include:  · Diet low in fiber  · Too much dairy  · Not drinking enough liquids  · Lack of exercise or physical activity. This is especially true for older adults.  · Changes in lifestyle or daily routine, including pregnancy, aging, work, and travel  · Frequent use or misuse of laxatives  · Ignoring the urge to have a bowel movement or delaying it until later  · Medicines, such as certain prescription pain medicines, iron supplements, antacids, certain antidepressants, and calcium supplements  · Diseases like irritable bowel syndrome, bowel obstructions, stroke, diabetes, thyroid disease, Parkinson disease, hemorrhoids, and colon cancer  Complications  Potential complications of constipation can include:  · Hemorrhoids  · Rectal bleeding from hemorrhoids or anal fissures (skin tears)  · Hernias  · Dependency on laxatives  · Chronic constipation  · Fecal impaction  · Bowel obstruction or perforation  Home care  All treatment should be done after talking with your healthcare provider. This is especially true if you have another medical problems, are taking prescription medicines, or are an older adult. Treatment most often involves lifestyle changes. You may also need medicines. Your healthcare provider will tell you which will work best for you. Follow  the advice below to help avoid this problem in the future.  Lifestyle changes  These lifestyle changes can help prevent constipation:  · Diet. Eat a high-fiber diet, with fresh fruit and vegetables, and reduce dairy intake, meats, and processed foods  · Fluids. It's important to get enough fluids each day. Drink plenty of water when you eat more fiber. If you are on diet that limits the amount of fluid you can have, talk about this with your healthcare provider.  · Regular exercise. Check with your healthcare provider first.  Medications  Take any medicines as directed. Some laxatives are safe to use only every now and then. Others can be taken on a regular basis. Talk with your doctor or pharmacist if you have questions.  Prescription pain medicines can cause constipation. If you are taking this kind of medicine, ask your healthcare provider if you should also take a stool softener.  Medicines you may take to treat constipation include:  · Fiber supplements  · Stool softeners  · Laxatives  · Enemas  · Rectal suppositories  Follow-up care  Follow up with your healthcare provider if symptoms don't get better in the next few days. You may need to have more tests or see a specialist.  Call 911  Call 911 if any of these occur:  · Trouble breathing  · Stiff, rigid abdomen that is severely painful to touch  · Confusion  · Fainting or loss of consciousness  · Rapid heart rate  · Chest pain  When to seek medical advice  Call your healthcare provider right away if any of these occur:  · Fever over 100.4°F (38°C)  · Failure to resume normal bowel movements  · Pain in your abdomen or back gets worse  · Nausea or vomiting  · Swelling in your abdomen  · Blood in the stool  · Black, tarry stool  · Involuntary weight loss  · Weakness  Date Last Reviewed: 12/30/2015  © 3700-5202 [employer]. [address] [place] [address], [place] [address]. All rights reserved. This information is not intended as a substitute for  professional medical care. Always follow your healthcare professional's instructions.

## 2017-08-30 NOTE — PROGRESS NOTES
Subjective:       Patient ID: Alyssa Hastings is a 86 y.o. female.    Chief Complaint: GI Problem (consult)    HPI 86 year old female here for a GI referral. Patient states constipation has been an issue since she was a child. She takes metamucil daily and laxatives prn. Patient drinks 6 bottles of water daily. She drinks prune juice. She denies blood in her stool.   Review of Systems   Constitutional: Negative for chills and fever.   Respiratory: Negative for chest tightness and shortness of breath.    Cardiovascular: Negative for chest pain and leg swelling.   Gastrointestinal: Positive for constipation. Negative for abdominal pain, blood in stool, nausea and vomiting.       Objective:      Vitals:    08/30/17 1302   BP: 135/64   Pulse: 74     Physical Exam   Constitutional: She appears well-developed and well-nourished. No distress.   Cardiovascular: Normal rate and regular rhythm.    Pulmonary/Chest: Effort normal. She has no wheezes.   Abdominal: Soft. There is no tenderness. There is no guarding.   Neurological: She is alert.   Skin: She is not diaphoretic.   Psychiatric: She has a normal mood and affect. Her behavior is normal. Judgment and thought content normal.   Vitals reviewed.      Assessment:       1. Constipation, unspecified constipation type        Plan:         1. Constipation: increase water intake, fiber, and exercise. Patient is taking metamucil daily. Will refer to GI  For further evaluation but patient was informed that a bowel movement every 2-3 days would be considered her baseline as this issue is chronic. She was advised to call clinic if her symptoms worsen.   Constipation, unspecified constipation type  -     Ambulatory referral to Gastroenterology    Other orders  -     docusate sodium (COLACE) 100 MG capsule; Take 1 capsule (100 mg total) by mouth 2 (two) times daily as needed for Constipation.  Dispense: 60 capsule; Refill: 0      Return if symptoms worsen or fail to improve.

## 2017-09-03 ENCOUNTER — HOSPITAL ENCOUNTER (OUTPATIENT)
Facility: HOSPITAL | Age: 82
Discharge: HOME | End: 2017-09-04
Attending: EMERGENCY MEDICINE | Admitting: HOSPITALIST
Payer: MEDICARE

## 2017-09-03 DIAGNOSIS — E87.1 HYPONATREMIA: Primary | ICD-10-CM

## 2017-09-03 DIAGNOSIS — J02.9 SORE THROAT: ICD-10-CM

## 2017-09-03 LAB
ALBUMIN SERPL BCP-MCNC: 3.2 G/DL
ALP SERPL-CCNC: 104 U/L
ALT SERPL W/O P-5'-P-CCNC: 165 U/L
ANION GAP SERPL CALC-SCNC: 7 MMOL/L
AST SERPL-CCNC: 195 U/L
BASOPHILS # BLD AUTO: 0.02 K/UL
BASOPHILS NFR BLD: 0.2 %
BILIRUB SERPL-MCNC: 0.5 MG/DL
BILIRUB UR QL STRIP: NEGATIVE
BUN SERPL-MCNC: 13 MG/DL
CALCIUM SERPL-MCNC: 8.8 MG/DL
CHLORIDE SERPL-SCNC: 93 MMOL/L
CLARITY UR: CLEAR
CO2 SERPL-SCNC: 26 MMOL/L
COLOR UR: YELLOW
CREAT SERPL-MCNC: 1 MG/DL
DIFFERENTIAL METHOD: ABNORMAL
EOSINOPHIL # BLD AUTO: 0.1 K/UL
EOSINOPHIL NFR BLD: 1.1 %
ERYTHROCYTE [DISTWIDTH] IN BLOOD BY AUTOMATED COUNT: 13.9 %
EST. GFR  (AFRICAN AMERICAN): 59 ML/MIN/1.73 M^2
EST. GFR  (NON AFRICAN AMERICAN): 51 ML/MIN/1.73 M^2
GLUCOSE SERPL-MCNC: 101 MG/DL
GLUCOSE UR QL STRIP: NEGATIVE
HCT VFR BLD AUTO: 26.7 %
HGB BLD-MCNC: 9 G/DL
HGB UR QL STRIP: NEGATIVE
KETONES UR QL STRIP: NEGATIVE
LEUKOCYTE ESTERASE UR QL STRIP: NEGATIVE
LYMPHOCYTES # BLD AUTO: 0.7 K/UL
LYMPHOCYTES NFR BLD: 8.1 %
MCH RBC QN AUTO: 30.4 PG
MCHC RBC AUTO-ENTMCNC: 33.7 G/DL
MCV RBC AUTO: 90 FL
MONOCYTES # BLD AUTO: 0.8 K/UL
MONOCYTES NFR BLD: 9.2 %
NEUTROPHILS # BLD AUTO: 7.3 K/UL
NEUTROPHILS NFR BLD: 81.2 %
NITRITE UR QL STRIP: NEGATIVE
PH UR STRIP: 7 [PH] (ref 5–8)
PLATELET # BLD AUTO: 279 K/UL
PMV BLD AUTO: 8.9 FL
POCT GLUCOSE: 115 MG/DL (ref 70–110)
POCT GLUCOSE: 135 MG/DL (ref 70–110)
POCT GLUCOSE: 95 MG/DL (ref 70–110)
POTASSIUM SERPL-SCNC: 4.5 MMOL/L
PROT SERPL-MCNC: 6.8 G/DL
PROT UR QL STRIP: ABNORMAL
RBC # BLD AUTO: 2.96 M/UL
SODIUM SERPL-SCNC: 126 MMOL/L
SODIUM UR-SCNC: 55 MMOL/L
SP GR UR STRIP: 1.01 (ref 1–1.03)
T4 FREE SERPL-MCNC: 1.22 NG/DL
TROPONIN I SERPL DL<=0.01 NG/ML-MCNC: <0.006 NG/ML
TSH SERPL DL<=0.005 MIU/L-ACNC: 8.24 UIU/ML
URN SPEC COLLECT METH UR: ABNORMAL
UROBILINOGEN UR STRIP-ACNC: NEGATIVE EU/DL
WBC # BLD AUTO: 8.99 K/UL

## 2017-09-03 PROCEDURE — 99285 EMERGENCY DEPT VISIT HI MDM: CPT | Mod: 25

## 2017-09-03 PROCEDURE — 63600175 PHARM REV CODE 636 W HCPCS: Performed by: HOSPITALIST

## 2017-09-03 PROCEDURE — G0378 HOSPITAL OBSERVATION PER HR: HCPCS

## 2017-09-03 PROCEDURE — 63600175 PHARM REV CODE 636 W HCPCS: Performed by: PHYSICIAN ASSISTANT

## 2017-09-03 PROCEDURE — 84443 ASSAY THYROID STIM HORMONE: CPT

## 2017-09-03 PROCEDURE — 81003 URINALYSIS AUTO W/O SCOPE: CPT

## 2017-09-03 PROCEDURE — 82962 GLUCOSE BLOOD TEST: CPT

## 2017-09-03 PROCEDURE — C9113 INJ PANTOPRAZOLE SODIUM, VIA: HCPCS | Performed by: PHYSICIAN ASSISTANT

## 2017-09-03 PROCEDURE — 96361 HYDRATE IV INFUSION ADD-ON: CPT

## 2017-09-03 PROCEDURE — 25000003 PHARM REV CODE 250: Performed by: PHYSICIAN ASSISTANT

## 2017-09-03 PROCEDURE — 84484 ASSAY OF TROPONIN QUANT: CPT

## 2017-09-03 PROCEDURE — 25000003 PHARM REV CODE 250: Performed by: NURSE PRACTITIONER

## 2017-09-03 PROCEDURE — 84300 ASSAY OF URINE SODIUM: CPT

## 2017-09-03 PROCEDURE — 80053 COMPREHEN METABOLIC PANEL: CPT

## 2017-09-03 PROCEDURE — 93005 ELECTROCARDIOGRAM TRACING: CPT

## 2017-09-03 PROCEDURE — 85025 COMPLETE CBC W/AUTO DIFF WBC: CPT

## 2017-09-03 PROCEDURE — 96374 THER/PROPH/DIAG INJ IV PUSH: CPT

## 2017-09-03 PROCEDURE — 93010 ELECTROCARDIOGRAM REPORT: CPT | Mod: ,,, | Performed by: INTERNAL MEDICINE

## 2017-09-03 PROCEDURE — 25000003 PHARM REV CODE 250: Performed by: HOSPITALIST

## 2017-09-03 PROCEDURE — 84439 ASSAY OF FREE THYROXINE: CPT

## 2017-09-03 RX ORDER — INSULIN ASPART 100 [IU]/ML
0-5 INJECTION, SOLUTION INTRAVENOUS; SUBCUTANEOUS
Status: DISCONTINUED | OUTPATIENT
Start: 2017-09-03 | End: 2017-09-04 | Stop reason: HOSPADM

## 2017-09-03 RX ORDER — ACETAMINOPHEN 325 MG/1
650 TABLET ORAL EVERY 6 HOURS PRN
Status: DISCONTINUED | OUTPATIENT
Start: 2017-09-03 | End: 2017-09-04 | Stop reason: HOSPADM

## 2017-09-03 RX ORDER — DORZOLAMIDE HCL 20 MG/ML
1 SOLUTION/ DROPS OPHTHALMIC 2 TIMES DAILY
Status: DISCONTINUED | OUTPATIENT
Start: 2017-09-03 | End: 2017-09-04 | Stop reason: HOSPADM

## 2017-09-03 RX ORDER — GLUCAGON 1 MG
1 KIT INJECTION
Status: DISCONTINUED | OUTPATIENT
Start: 2017-09-03 | End: 2017-09-04 | Stop reason: HOSPADM

## 2017-09-03 RX ORDER — ENOXAPARIN SODIUM 100 MG/ML
40 INJECTION SUBCUTANEOUS EVERY 24 HOURS
Status: DISCONTINUED | OUTPATIENT
Start: 2017-09-03 | End: 2017-09-04 | Stop reason: HOSPADM

## 2017-09-03 RX ORDER — HYDRALAZINE HYDROCHLORIDE 25 MG/1
50 TABLET, FILM COATED ORAL 3 TIMES DAILY
Status: DISCONTINUED | OUTPATIENT
Start: 2017-09-03 | End: 2017-09-04 | Stop reason: HOSPADM

## 2017-09-03 RX ORDER — PANTOPRAZOLE SODIUM 40 MG/10ML
40 INJECTION, POWDER, LYOPHILIZED, FOR SOLUTION INTRAVENOUS
Status: COMPLETED | OUTPATIENT
Start: 2017-09-03 | End: 2017-09-03

## 2017-09-03 RX ORDER — PANTOPRAZOLE SODIUM 40 MG/1
40 TABLET, DELAYED RELEASE ORAL DAILY
Status: DISCONTINUED | OUTPATIENT
Start: 2017-09-04 | End: 2017-09-04 | Stop reason: HOSPADM

## 2017-09-03 RX ORDER — IBUPROFEN 200 MG
16 TABLET ORAL
Status: DISCONTINUED | OUTPATIENT
Start: 2017-09-03 | End: 2017-09-04 | Stop reason: HOSPADM

## 2017-09-03 RX ORDER — IBUPROFEN 200 MG
24 TABLET ORAL
Status: DISCONTINUED | OUTPATIENT
Start: 2017-09-03 | End: 2017-09-04 | Stop reason: HOSPADM

## 2017-09-03 RX ORDER — CETIRIZINE HYDROCHLORIDE 10 MG/1
10 TABLET ORAL DAILY
Status: DISCONTINUED | OUTPATIENT
Start: 2017-09-04 | End: 2017-09-04 | Stop reason: HOSPADM

## 2017-09-03 RX ORDER — LISINOPRIL 20 MG/1
20 TABLET ORAL DAILY
Status: DISCONTINUED | OUTPATIENT
Start: 2017-09-04 | End: 2017-09-04 | Stop reason: HOSPADM

## 2017-09-03 RX ORDER — NAPROXEN SODIUM 220 MG/1
81 TABLET, FILM COATED ORAL DAILY
Status: DISCONTINUED | OUTPATIENT
Start: 2017-09-04 | End: 2017-09-04 | Stop reason: HOSPADM

## 2017-09-03 RX ORDER — LEVOTHYROXINE SODIUM 25 UG/1
50 TABLET ORAL
Status: DISCONTINUED | OUTPATIENT
Start: 2017-09-04 | End: 2017-09-04 | Stop reason: HOSPADM

## 2017-09-03 RX ORDER — OLOPATADINE HYDROCHLORIDE 1 MG/ML
1 SOLUTION/ DROPS OPHTHALMIC DAILY
Status: DISCONTINUED | OUTPATIENT
Start: 2017-09-04 | End: 2017-09-04 | Stop reason: HOSPADM

## 2017-09-03 RX ORDER — SODIUM CHLORIDE 9 MG/ML
INJECTION, SOLUTION INTRAVENOUS CONTINUOUS
Status: DISCONTINUED | OUTPATIENT
Start: 2017-09-03 | End: 2017-09-04 | Stop reason: HOSPADM

## 2017-09-03 RX ORDER — METOPROLOL SUCCINATE 25 MG/1
25 TABLET, EXTENDED RELEASE ORAL DAILY
Status: DISCONTINUED | OUTPATIENT
Start: 2017-09-04 | End: 2017-09-04 | Stop reason: HOSPADM

## 2017-09-03 RX ORDER — DOCUSATE SODIUM 100 MG/1
100 CAPSULE, LIQUID FILLED ORAL 2 TIMES DAILY PRN
Status: DISCONTINUED | OUTPATIENT
Start: 2017-09-04 | End: 2017-09-04 | Stop reason: HOSPADM

## 2017-09-03 RX ORDER — ONDANSETRON 8 MG/1
8 TABLET, ORALLY DISINTEGRATING ORAL EVERY 8 HOURS PRN
Status: DISCONTINUED | OUTPATIENT
Start: 2017-09-03 | End: 2017-09-04 | Stop reason: HOSPADM

## 2017-09-03 RX ADMIN — ENOXAPARIN SODIUM 40 MG: 100 INJECTION SUBCUTANEOUS at 06:09

## 2017-09-03 RX ADMIN — PANTOPRAZOLE SODIUM 40 MG: 40 INJECTION, POWDER, FOR SOLUTION INTRAVENOUS at 03:09

## 2017-09-03 RX ADMIN — SODIUM CHLORIDE: 0.9 INJECTION, SOLUTION INTRAVENOUS at 06:09

## 2017-09-03 RX ADMIN — SODIUM CHLORIDE 1000 ML: 0.9 INJECTION, SOLUTION INTRAVENOUS at 03:09

## 2017-09-03 RX ADMIN — HYDRALAZINE HYDROCHLORIDE 50 MG: 25 TABLET, FILM COATED ORAL at 09:09

## 2017-09-03 RX ADMIN — BIMATOPROST 1 DROP: 0.1 SOLUTION/ DROPS OPHTHALMIC at 11:09

## 2017-09-03 RX ADMIN — DORZOLAMIDE HYDROCHLORIDE 1 DROP: 20 SOLUTION/ DROPS OPHTHALMIC at 11:09

## 2017-09-03 NOTE — ED NOTES
Pt requested and given blankets.  Family members at bedside.  Dr Pereira speaking with Dr Taylor on telephone about possible admission.

## 2017-09-03 NOTE — ED PROVIDER NOTES
Encounter Date: 9/3/2017       History     Chief Complaint   Patient presents with    Sore Throat     that began last night also reports chills      Afebrile 86-year-old female with past medical history of seasonal ALLERGIES, CAD status post CABG, DM, GERD, HTN, CVA, hypothyroidism, CK ED stage III presents to the ED for evaluation of sore throat.  She states that pain is a scratchy sensation that began last night.  She does report some associated chills and that sometimes the pain radiates to the right sided chest region.  She does report that this chest pain is exacerbated with episodes of seat sneezing and palpation and that she has had increased episodes of sneezing over the past several days.  She has taken her ALLERGY medication as directed and a Tylenol with little relief of the pain.  She reports that she has Ultram at home however does not like to take this as it causes constipation.  She denies any headache, vision changes, shortness of breath, left-sided chest pain, nausea, vomiting, numbness, tingling or fever.       The history is provided by the patient and a relative.     Review of patient's allergies indicates:   Allergen Reactions    Actos [pioglitazone]     Amlodipine     Bentyl [dicyclomine]     Benzocaine     Declomycin [demeclocycline]     Doxycycline     Erythropoietin analogues     Gabapentin     Hydrocodone     Lipitor [atorvastatin]     Losartan     Metronidazole     Nortriptyline     Oxybutynin     Pcn [penicillins]     Potassium     Pravachol [pravastatin]     Procaine     Promethazine     Propranolol     Ramipril     Sulfa (sulfonamide antibiotics)      Past Medical History:   Diagnosis Date    Coronary artery disease     Diabetes mellitus     GERD (gastroesophageal reflux disease)     Glaucoma     Hypertension     Stroke     Thyroid disease      Past Surgical History:   Procedure Laterality Date    APPENDECTOMY      CARDIAC SURGERY      CHOLECYSTECTOMY       EYE SURGERY      HYSTERECTOMY       Family History   Problem Relation Age of Onset    Prostate cancer Neg Hx     Kidney disease Neg Hx      Social History   Substance Use Topics    Smoking status: Never Smoker    Smokeless tobacco: Never Used    Alcohol use No     Review of Systems   Constitutional: Positive for chills. Negative for activity change, appetite change and fever.   HENT: Positive for congestion, postnasal drip, rhinorrhea and sore throat. Negative for trouble swallowing and voice change.    Respiratory: Negative for cough and shortness of breath.    Cardiovascular: Negative for chest pain ( right-sided chest pain).   Gastrointestinal: Positive for abdominal pain (epigastric). Negative for nausea and vomiting.   Genitourinary: Negative for dysuria.   Musculoskeletal: Negative for back pain, neck pain and neck stiffness.   Skin: Negative for rash.   Neurological: Negative for dizziness, weakness, light-headedness, numbness and headaches.   Hematological: Does not bruise/bleed easily.       Physical Exam     Initial Vitals [09/03/17 1259]   BP Pulse Resp Temp SpO2   (!) 190/76 66 18 98.1 °F (36.7 °C) 96 %      MAP       114         Physical Exam    Nursing note and vitals reviewed.  Constitutional: Vital signs are normal. She appears well-developed and well-nourished. She is cooperative.  Non-toxic appearance. She does not appear ill. No distress.   HENT:   Head: Normocephalic and atraumatic.   Nose: Mucosal edema present.   Mouth/Throat: Oropharynx is clear and moist. Mucous membranes are not dry.   Eyes: Conjunctivae and lids are normal.   Neck: Neck supple. No neck rigidity.   Cardiovascular: Normal rate and regular rhythm.   Pulmonary/Chest: Breath sounds normal. No tachypnea. No respiratory distress. She has no wheezes. She has no rhonchi.       Abdominal: Soft. Normal appearance and bowel sounds are normal. There is tenderness in the epigastric area. There is no rigidity, no rebound and no  guarding.       Musculoskeletal: Normal range of motion.   Neurological: She is alert and oriented to person, place, and time. She has normal strength. GCS eye subscore is 4. GCS verbal subscore is 5. GCS motor subscore is 6.   Skin: Skin is warm, dry and intact. No rash noted.   Psychiatric: She has a normal mood and affect. Her speech is normal and behavior is normal. Thought content normal.         ED Course   Procedures  Labs Reviewed   CBC W/ AUTO DIFFERENTIAL - Abnormal; Notable for the following:        Result Value    RBC 2.96 (*)     Hemoglobin 9.0 (*)     Hematocrit 26.7 (*)     MPV 8.9 (*)     Lymph # 0.7 (*)     Gran% 81.2 (*)     Lymph% 8.1 (*)     All other components within normal limits   COMPREHENSIVE METABOLIC PANEL - Abnormal; Notable for the following:     Sodium 126 (*)     Chloride 93 (*)     Albumin 3.2 (*)      (*)      (*)     Anion Gap 7 (*)     eGFR if  59 (*)     eGFR if non  51 (*)     All other components within normal limits   POCT GLUCOSE - Abnormal; Notable for the following:     POCT Glucose 115 (*)     All other components within normal limits         Imaging Results          X-Ray Chest PA And Lateral (Final result)  Result time 09/03/17 15:05:07    Final result by Junaid Moore MD (09/03/17 15:05:07)                 Impression:     Status post CABG. No acute cardiopulmonary disease.      Electronically signed by: JUNAID MOORE MD  Date:     09/03/17  Time:    15:05              Narrative:    PA and lateral views of the chest were obtained.  Comparison -- 2/16/05. Postoperative changes from sternotomy are again identified, likely CABG procedure. The heart size is upper normal. Mediastinum shows tortuous descending aorta. The lungs are expanded and clear. No lung consolidation or pleural fluid is identified. Skeletal structures show sternal wires and  extensive degenerative changes in the spine.                                    Medical Decision Making:   Initial Assessment:   Afebrile 86-year-old female with past medical history of seasonal ALLERGIES, CAD status post CABG, DM, GERD, HTN, CVA, hypothyroidism, CK ED stage III presents to the ED for evaluation of sore throat.  She states that pain is a scratchy sensation that began last night.  She does report some associated chills and that sometimes the pain radiates to the right sided chest region.  She does report that this chest pain is exacerbated with episodes of seat sneezing and palpation and that she has had increased episodes of sneezing over the past several days.  She has taken her ALLERGY medication as directed and a Tylenol with little relief of the pain.  She reports that she has Ultram at home however does not like to take this as it causes constipation.  She denies any headache, vision changes, shortness of breath, left-sided chest pain, nausea, vomiting, numbness, tingling or fever.  Physical exam reveals well-appearing female in no obvious distress that is alert and oriented ×3.  Mucous membranes are moist and free of pallor; nose with moderate edema and congestion, oropharynx free of erythema, edema or exudates.  Lungs clear to auscultation; heart regular rate and rhythm.  Tenderness to palpation of the right costosternal region with no bony deformity, ecchymosis or edema.  Abdomen with mild TTP of the epigastric region with no rebound, guarding or rigidity.  Fair range of motion of neck and all extremities with strength equal bilaterally.  Skin free of rash, pallor or diaphoresis.  Differential Diagnosis:   Rhinopharyngitis, strep pharyngitis, GERD, acute coronary syndrome, bronchitis, pneumonia, costochondritis, rib fracture  Clinical Tests:   Lab Tests: Ordered and Reviewed  Radiological Study: Ordered and Reviewed  ED Management:  Labs notable for microcytic anemia which appears chronic.  Chemistry is significant for moderate change and sodium as she has  been hyponatremic for sometime however has had 5 point drop since July.  Patient does continue to be asymptomatic at this time is stable and alert and oriented ×3 with no seizure activity or altered mental status.  AST and ALT are noted to be elevated which appears chronic as well although noted to be higher today.  Patient with stable appearing CKD.  Chest x-ray is unremarkable.  EKG with no acute changes.  As patient was found to have a markedly change in her sodium we recommended observation and sodium correction.  We did inform patient and family of this finding and she is agreeable to this treatment plan.  We discussed patient with [facility] hospitalist and he will admit.  At this time troponin is negative and TSH is pending.              Attending Attestation:     Physician Attestation Statement for NP/PA:   I have conducted a face to face encounter with this patient in addition to the NP/PA, due to NP/PA Request    Other NP/PA Attestation Additions:    History of Present Illness: Patient with sore throat and generalized weakness.     Medical Decision Making: Found to be hyponatremic.  Given IVFs and will admit for correction                 ED Course as of Sep 03 1617   Sun Sep 03, 2017   1557 Dr. Dixon paged.  [LD]   [id number] Discussed case with Dr Dixon.  Recommends adding UA and urine Na+  [LD]      ED Course User Index  [LD] Gaby Pereira MD     Clinical Impression:   The primary encounter diagnosis was Hyponatremia. A diagnosis of Sore throat was also pertinent to this visit.                           JERRY Bui  09/03/17 1628       Gaby Pereira MD  09/05/17 [id number]

## 2017-09-03 NOTE — ED NOTES
Sore throat pain that began last night with chills that began last pm.  Pain increases with swallowing, was able to eat breakfast this am.  Denies fever.  Lives in an assisted living facility.  Speech clear, coherent.  Pt reports has chronic nasal congestion.

## 2017-09-04 VITALS
HEIGHT: 60 IN | OXYGEN SATURATION: 96 % | HEART RATE: 68 BPM | TEMPERATURE: 98 F | DIASTOLIC BLOOD PRESSURE: 68 MMHG | WEIGHT: 161.63 LBS | SYSTOLIC BLOOD PRESSURE: 157 MMHG | RESPIRATION RATE: 18 BRPM | BODY MASS INDEX: 31.73 KG/M2

## 2017-09-04 PROBLEM — D64.9 NORMOCHROMIC NORMOCYTIC ANEMIA: Status: ACTIVE | Noted: 2017-09-04

## 2017-09-04 LAB
ALBUMIN SERPL BCP-MCNC: 2.9 G/DL
ALP SERPL-CCNC: 91 U/L
ALT SERPL W/O P-5'-P-CCNC: 141 U/L
ANION GAP SERPL CALC-SCNC: 6 MMOL/L
AST SERPL-CCNC: 164 U/L
BASOPHILS # BLD AUTO: 0.02 K/UL
BASOPHILS NFR BLD: 0.3 %
BILIRUB DIRECT SERPL-MCNC: 0.3 MG/DL
BILIRUB SERPL-MCNC: 0.5 MG/DL
BUN SERPL-MCNC: 11 MG/DL
CALCIUM SERPL-MCNC: 8.5 MG/DL
CHLORIDE SERPL-SCNC: 99 MMOL/L
CO2 SERPL-SCNC: 25 MMOL/L
CREAT SERPL-MCNC: 0.8 MG/DL
DIFFERENTIAL METHOD: ABNORMAL
EOSINOPHIL # BLD AUTO: 0.1 K/UL
EOSINOPHIL NFR BLD: 1.5 %
ERYTHROCYTE [DISTWIDTH] IN BLOOD BY AUTOMATED COUNT: 14 %
EST. GFR  (AFRICAN AMERICAN): >60 ML/MIN/1.73 M^2
EST. GFR  (NON AFRICAN AMERICAN): >60 ML/MIN/1.73 M^2
GLUCOSE SERPL-MCNC: 91 MG/DL
HCT VFR BLD AUTO: 27.9 %
HGB BLD-MCNC: 9.4 G/DL
LYMPHOCYTES # BLD AUTO: 0.7 K/UL
LYMPHOCYTES NFR BLD: 11.5 %
MAGNESIUM SERPL-MCNC: 1.6 MG/DL
MCH RBC QN AUTO: 30.1 PG
MCHC RBC AUTO-ENTMCNC: 33.7 G/DL
MCV RBC AUTO: 89 FL
MONOCYTES # BLD AUTO: 0.7 K/UL
MONOCYTES NFR BLD: 11.2 %
NEUTROPHILS # BLD AUTO: 4.5 K/UL
NEUTROPHILS NFR BLD: 75.3 %
PHOSPHATE SERPL-MCNC: 3 MG/DL
PLATELET # BLD AUTO: 222 K/UL
PMV BLD AUTO: 9 FL
POCT GLUCOSE: 109 MG/DL (ref 70–110)
POTASSIUM SERPL-SCNC: 3.9 MMOL/L
PROT SERPL-MCNC: 6.1 G/DL
RBC # BLD AUTO: 3.12 M/UL
SODIUM SERPL-SCNC: 130 MMOL/L
WBC # BLD AUTO: 5.98 K/UL

## 2017-09-04 PROCEDURE — 25000003 PHARM REV CODE 250: Performed by: HOSPITALIST

## 2017-09-04 PROCEDURE — 83735 ASSAY OF MAGNESIUM: CPT

## 2017-09-04 PROCEDURE — G0378 HOSPITAL OBSERVATION PER HR: HCPCS

## 2017-09-04 PROCEDURE — 85025 COMPLETE CBC W/AUTO DIFF WBC: CPT

## 2017-09-04 PROCEDURE — 25000003 PHARM REV CODE 250: Performed by: NURSE PRACTITIONER

## 2017-09-04 PROCEDURE — 36415 COLL VENOUS BLD VENIPUNCTURE: CPT

## 2017-09-04 PROCEDURE — 80048 BASIC METABOLIC PNL TOTAL CA: CPT

## 2017-09-04 PROCEDURE — 94761 N-INVAS EAR/PLS OXIMETRY MLT: CPT

## 2017-09-04 PROCEDURE — 80076 HEPATIC FUNCTION PANEL: CPT

## 2017-09-04 PROCEDURE — 84100 ASSAY OF PHOSPHORUS: CPT

## 2017-09-04 RX ORDER — AMIODARONE HYDROCHLORIDE 200 MG/1
200 TABLET ORAL DAILY
Status: DISCONTINUED | OUTPATIENT
Start: 2017-09-04 | End: 2017-09-04 | Stop reason: HOSPADM

## 2017-09-04 RX ORDER — SOLIFENACIN SUCCINATE 5 MG/1
10 TABLET, FILM COATED ORAL DAILY
Status: DISCONTINUED | OUTPATIENT
Start: 2017-09-04 | End: 2017-09-04 | Stop reason: HOSPADM

## 2017-09-04 RX ORDER — IPRATROPIUM BROMIDE 42 UG/1
1 SPRAY, METERED NASAL 2 TIMES DAILY PRN
Status: DISCONTINUED | OUTPATIENT
Start: 2017-09-04 | End: 2017-09-04 | Stop reason: HOSPADM

## 2017-09-04 RX ORDER — ROSUVASTATIN CALCIUM 5 MG/1
5 TABLET, COATED ORAL DAILY
Status: DISCONTINUED | OUTPATIENT
Start: 2017-09-04 | End: 2017-09-04

## 2017-09-04 RX ADMIN — CETIRIZINE HYDROCHLORIDE 10 MG: 10 TABLET, FILM COATED ORAL at 08:09

## 2017-09-04 RX ADMIN — ASPIRIN 81 MG 81 MG: 81 TABLET ORAL at 08:09

## 2017-09-04 RX ADMIN — SOLIFENACIN SUCCINATE 10 MG: 5 TABLET, FILM COATED ORAL at 08:09

## 2017-09-04 RX ADMIN — AMIODARONE HYDROCHLORIDE 200 MG: 200 TABLET ORAL at 08:09

## 2017-09-04 RX ADMIN — LISINOPRIL 20 MG: 20 TABLET ORAL at 08:09

## 2017-09-04 RX ADMIN — PANTOPRAZOLE SODIUM 40 MG: 40 TABLET, DELAYED RELEASE ORAL at 08:09

## 2017-09-04 RX ADMIN — HYDRALAZINE HYDROCHLORIDE 50 MG: 25 TABLET, FILM COATED ORAL at 05:09

## 2017-09-04 RX ADMIN — LEVOTHYROXINE SODIUM 50 MCG: 25 TABLET ORAL at 05:09

## 2017-09-04 RX ADMIN — SODIUM CHLORIDE: 0.9 INJECTION, SOLUTION INTRAVENOUS at 02:09

## 2017-09-04 RX ADMIN — METOPROLOL SUCCINATE 25 MG: 25 TABLET, EXTENDED RELEASE ORAL at 08:09

## 2017-09-04 RX ADMIN — DORZOLAMIDE HYDROCHLORIDE 1 DROP: 20 SOLUTION/ DROPS OPHTHALMIC at 08:09

## 2017-09-04 NOTE — ASSESSMENT & PLAN NOTE
Slightly improved this morning; but remains elevated (chronic issue). Continue to Hold crestor 5 mg daily.  Avoid hepatotoxins.  F/u with PCP.

## 2017-09-04 NOTE — DISCHARGE SUMMARY
[facility] Hospital Medicine  Discharge Summary      Patient Name: Alyssa Hastings  MRN: [id number]  Admission Date: 9/3/2017  Hospital Length of Stay: 0 days  Discharge Date and Time:  09/04/2017 7:12 AM  Attending Physician: You Dixon MD   Discharging Provider: Anthony Carney NP  Primary Care Provider: Ashley Johnson MD      HPI:   Alyssa Hastings is a 87 y/o  female with seasonal allergies, GERD, HTN, hypothyroidism, CKD stage III, type 2 DM, glaucoma, and CAD s/p CABG x 2.  Her PCP is Dr. Ashley Johnson.    Presented to ED on 9/3/17 with complaints of sore throat beginning the night of 9/2/17 accompanied by chills, fatigue, occasional headache, and chronic nasal congestion.  Denies fever, cough, sputum production, chest pain.  Sore throat worsened with oral intake.  No alleviating factors identified.    Upon arrival to ED patient with Hgb 9, Hct 26.7, sodium 126, chloride 93.  No acute abnormailites seen on CXR.  She was given 1L NS bolus in ED.  Admitted to Hospital medicine service with hyponatremia.    * No surgery found *      Indwelling Lines/Drains at time of discharge:   Lines/Drains/Airways          No matching active lines, drains, or airways        Hospital Course:   She was given gentle hydration with IVFs (NS) overnight with increase in serum sodium to 130 (Baseline ~ 131).  LFTs remained elevated, crestor was discontinued.  She was discharged home to / with PCP.     Consults:     Significant Diagnostic Studies: Labs:   CMP   Recent Labs  Lab 09/03/17  1439 09/04/17  0320   * 130*   K 4.5 3.9   CL 93* 99   CO2 26 25    91   BUN 13 11   CREATININE 1.0 0.8   CALCIUM 8.8 8.5*   PROT 6.8 6.1   ALBUMIN 3.2* 2.9*   BILITOT 0.5 0.5   ALKPHOS 104 91   * 164*   * 141*   ANIONGAP 7* 6*   ESTGFRAFRICA 59* >60   EGFRNONAA 51* >60   , CBC   Recent Labs  Lab 09/03/17  1439 09/04/17  0320   WBC 8.99 5.98   HGB 9.0* 9.4*   HCT 26.7* 27.9*   PLT  279 222    and All labs within the past 24 hours have been reviewed  Radiology: X-Ray: CXR: X-Ray Chest PA and Lateral (CXR):   Results for orders placed or performed during the hospital encounter of 09/03/17   X-Ray Chest PA And Lateral    Narrative    PA and lateral views of the chest were obtained.  Comparison -- 2/16/05. Postoperative changes from sternotomy are again identified, likely CABG procedure. The heart size is upper normal. Mediastinum shows tortuous descending aorta. The lungs are expanded and clear. No lung consolidation or pleural fluid is identified. Skeletal structures show sternal wires and  extensive degenerative changes in the spine.    Impression     Status post CABG. No acute cardiopulmonary disease.      Electronically signed by: SANDRA MOORE MD  Date:     09/03/17  Time:    15:05        Pending Diagnostic Studies:     None        Final Active Diagnoses:    Diagnosis Date Noted POA    PRINCIPAL PROBLEM:  Hyponatremia [E87.1] 09/03/2017 Yes    Normochromic normocytic anemia [D64.9] 09/04/2017 Yes    CKD (chronic kidney disease) stage 3, GFR 30-59 ml/min [N18.3] 07/19/2017 Yes    History of coronary artery bypass graft x 2 [Z95.1] 07/19/2017 Not Applicable    Abnormal liver enzymes [R74.8] 07/19/2017 Yes    Urge incontinence [N39.41] 07/05/2017 Yes    Primary open-angle glaucoma, bilateral, moderate stage [H40.1132] 06/20/2017 Yes    Hypothyroidism [E03.9] 05/10/2017 Yes    Essential hypertension [I10] 05/10/2017 Yes    Allergic rhinitis due to allergen [J30.9] 05/10/2017 Yes      Problems Resolved During this Admission:    Diagnosis Date Noted Date Resolved POA      * Hyponatremia    Serum sodium 126 on admission, down from 131 on 7/21/17.  Gently hydrated overnight with NS with improvement in sodium to 130.   F/u with PCP as outpatient for chronic hyponatremia.          Normochromic normocytic anemia    Stable.            Abnormal liver enzymes    Slightly improved this  morning; but remains elevated (chronic issue). Continue to Hold crestor 5 mg daily.  Avoid hepatotoxins.  F/u with PCP.          History of coronary artery bypass graft x 2    Continue ASA therapy, holding rosuvastatin (Crestor) due to elevated LFTs.          CKD (chronic kidney disease) stage 3, GFR 30-59 ml/min    Chronic; Stable.  Avoid nephrotoxins.           Urge incontinence    Continue vesicare 10 mg daily.          Primary open-angle glaucoma, bilateral, moderate stage    Continue trusopt and lumigan eye gtts.          Allergic rhinitis due to allergen    Continue levocetirizine 5 mg daily and atrovent nasal spray BID at home.           Hypothyroidism    Continue levothyroxine.          Essential hypertension    Continue lisinopril, hydralazine, and metoprolol.  Continue to monitor.              Discharged Condition: stable    Disposition: Home or Self Care    Follow Up:  Follow-up Information     Schedule an appointment as soon as possible for a visit with Ashley Johnson MD.    Specialty:  Family Medicine  Why:  Post-hospital follow up visit.   Contact information:  [address] [facility]  [id number]  [facility] [address] [phone]                 Patient Instructions:     Diet general     Activity as tolerated     Call MD for:  severe uncontrolled pain     Call MD for:  difficulty breathing or increased cough     Call MD for:  increased confusion or weakness       Medications:  Reconciled Home Medications:   Current Discharge Medication List      CONTINUE these medications which have NOT CHANGED    Details   acetaminophen (TYLENOL) 650 MG TbSR Take 1 tablet (650 mg total) by mouth every 12 (twelve) hours as needed (start with daily prn).  Qty: 60 tablet, Refills: 1      amiodarone (PACERONE) 200 MG Tab Take by mouth once daily.      aspirin 81 MG Chew Take 81 mg by mouth once daily.      bimatoprost (LUMIGAN) 0.03 % ophthalmic drops Place 1 drop into both eyes nightly.  Qty: 2.5 mL, Refills: 6     Associated Diagnoses: Primary open angle glaucoma of both eyes, severe stage      brimonidine-timolol (COMBIGAN) 0.2-0.5 % Drop Place 1 drop into the left eye 2 (two) times daily.  Qty: 10 mL, Refills: 6    Associated Diagnoses: Primary open angle glaucoma of both eyes, severe stage      docusate sodium (COLACE) 100 MG capsule Take 1 capsule (100 mg total) by mouth 2 (two) times daily as needed for Constipation.  Qty: 60 capsule, Refills: 0      dorzolamide (TRUSOPT) 2 % ophthalmic solution Place 1 drop into the left eye 2 (two) times daily.  Qty: 10 mL, Refills: 6    Associated Diagnoses: Primary open angle glaucoma of both eyes, severe stage      esomeprazole (NEXIUM) 40 MG capsule Take 1 capsule (40 mg total) by mouth before breakfast.  Qty: 30 capsule, Refills: 5      hydrALAZINE (APRESOLINE) 50 MG tablet Take 1 tablet (50 mg total) by mouth 3 (three) times daily.  Qty: 90 tablet, Refills: 11      hyoscyamine (ANASPAZ,LEVSIN) 0.125 mg Tab Take 125 mcg by mouth every 4 (four) hours as needed.      ipratropium (ATROVENT) 0.02 % nebulizer solution Take 500 mcg by nebulization 4 (four) times daily. Rescue      ipratropium (ATROVENT) 0.06 % nasal spray 1 spray by Nasal route 2 (two) times daily as needed for Rhinitis.  Qty: 15 mL, Refills: 1      levocetirizine (XYZAL) 5 MG tablet TAKE ONE TABLET BY MOUTH EVERY MORNING  Qty: 30 tablet, Refills: 3    Comments: This prescription was filled on 6/30/2017. Any refills authorized will be placed on file.      levothyroxine (SYNTHROID) 50 MCG tablet Take 1 tablet (50 mcg total) by mouth once daily.  Qty: 30 tablet, Refills: 5      lisinopril (PRINIVIL,ZESTRIL) 40 MG tablet Take 1 tablet (40 mg total) by mouth once daily.  Qty: 30 tablet, Refills: 0      METAMUCIL 0.52 gram capsule TAKE 2 TO 6 CAPSULES BY MOUTH DAILY OR TAKE 5 CAPSULES UP TO FOUR TIMES DAILY  Qty: 100 capsule, Refills: 1      metoprolol succinate (TOPROL-XL) 25 MG 24 hr tablet Take 25 mg by mouth once daily.       nitroGLYCERIN (NITROSTAT) 0.4 MG SL tablet Place 0.4 mg under the tongue every 5 (five) minutes as needed for Chest pain.      olopatadine (PATADAY) 0.2 % Drop Place 1 drop into both eyes once daily.  Qty: 2.5 mL, Refills: 6    Associated Diagnoses: Primary open angle glaucoma of both eyes, severe stage      PSYLLIUM HUSK/CALCIUM CARB (METAMUCIL PLUS CALCIUM ORAL) Take by mouth.      solifenacin (VESICARE) 10 MG tablet Take 1 tablet (10 mg total) by mouth once daily.  Qty: 30 tablet, Refills: 11      sucralfate (CARAFATE) 1 gram tablet Take 1 g by mouth 4 (four) times daily.      tramadol (ULTRAM) 50 mg tablet Take 50 mg by mouth every 6 (six) hours as needed for Pain.      trolamine salicylate (ASPERCREME) 10 % cream Apply topically as needed.         STOP taking these medications       rosuvastatin (CRESTOR) 5 MG tablet Comments:   Reason for Stopping:             Time spent on the discharge of patient: 35 minutes    HOS POC IP DISCHARGE SUMMARY    Anthony Carney NP  Department of Hospital Medicine  [facility]

## 2017-09-04 NOTE — ASSESSMENT & PLAN NOTE
Serum sodium 126 on admission, down from 131 on 7/21/17.  Gently hydrated overnight with NS with improvement in sodium to 130.   F/u with PCP as outpatient for chronic hyponatremia.

## 2017-09-04 NOTE — PLAN OF CARE
Problem: Patient Care Overview  Goal: Plan of Care Review  Sats 98% RA , will continue to monitor.

## 2017-09-04 NOTE — PT/OT/SLP PROGRESS
Physical Therapy      Alyssa Hastings  MRN: [id number]    Patient not seen today secondary to Other (Comment) (Pt being discharged,), GABRIEL Krishnamurthy, was entering room to discharge pt from hospital.   Casey Covington, PT

## 2017-09-04 NOTE — H&P
[facility] Hospital Medicine  History & Physical    Patient Name: Alyssa Hastings  MRN: [id number]  Admission Date: 9/3/2017  Attending Physician: You Dixon MD   Primary Care Provider: Ashley Johnson MD         Patient information was obtained from patient, past medical records and ER records.     Subjective:     Principal Problem:Hyponatremia    Chief Complaint:   Chief Complaint   Patient presents with    Sore Throat     that began last night also reports chills         HPI: Alyssa Hastings is a 87 y/o  female with seasonal allergies, GERD, HTN, hypothyroidism, CKD stage III, type 2 DM, glaucoma, and CAD s/p CABG x 2.  Her PCP is Dr. Ashley Johnson.    Presented to ED on 9/3/17 with complaints of sore throat beginning the night of 9/2/17 accompanied by chills, fatigue, occasional headache, and chronic nasal congestion.  Denies fever, cough, sputum production, chest pain.  Sore throat worsened with oral intake.  No alleviating factors identified.    Upon arrival to ED patient with Hgb 9, Hct 26.7, sodium 126, chloride 93.  No acute abnormailites seen on CXR.  She was given 1L NS bolus in ED.  Admitted to Hospital medicine service with hyponatremia.    Past Medical History:   Diagnosis Date    Coronary artery disease     Diabetes mellitus     GERD (gastroesophageal reflux disease)     Glaucoma     Hypertension     Stroke     Thyroid disease        Past Surgical History:   Procedure Laterality Date    APPENDECTOMY      CARDIAC SURGERY      CHOLECYSTECTOMY      EYE SURGERY      HYSTERECTOMY         Review of patient's allergies indicates:   Allergen Reactions    Actos [pioglitazone]     Amlodipine     Bentyl [dicyclomine]     Benzocaine     Declomycin [demeclocycline]     Doxycycline     Erythropoietin analogues     Gabapentin     Hydrocodone     Lipitor [atorvastatin]     Losartan     Metronidazole     Nortriptyline     Oxybutynin     Pcn  [penicillins]     Potassium     Pravachol [pravastatin]     Procaine     Promethazine     Propranolol     Ramipril     Sulfa (sulfonamide antibiotics)        No current facility-administered medications on file prior to encounter.      Current Outpatient Prescriptions on File Prior to Encounter   Medication Sig    acetaminophen (TYLENOL) 650 MG TbSR Take 1 tablet (650 mg total) by mouth every 12 (twelve) hours as needed (start with daily prn).    amiodarone (PACERONE) 200 MG Tab Take by mouth once daily.    aspirin 81 MG Chew Take 81 mg by mouth once daily.    bimatoprost (LUMIGAN) 0.03 % ophthalmic drops Place 1 drop into both eyes nightly.    brimonidine-timolol (COMBIGAN) 0.2-0.5 % Drop Place 1 drop into the left eye 2 (two) times daily.    docusate sodium (COLACE) 100 MG capsule Take 1 capsule (100 mg total) by mouth 2 (two) times daily as needed for Constipation.    dorzolamide (TRUSOPT) 2 % ophthalmic solution Place 1 drop into the left eye 2 (two) times daily.    esomeprazole (NEXIUM) 40 MG capsule Take 1 capsule (40 mg total) by mouth before breakfast.    hydrALAZINE (APRESOLINE) 50 MG tablet Take 1 tablet (50 mg total) by mouth 3 (three) times daily.    hyoscyamine (ANASPAZ,LEVSIN) 0.125 mg Tab Take 125 mcg by mouth every 4 (four) hours as needed.    ipratropium (ATROVENT) 0.02 % nebulizer solution Take 500 mcg by nebulization 4 (four) times daily. Rescue    ipratropium (ATROVENT) 0.06 % nasal spray 1 spray by Nasal route 2 (two) times daily as needed for Rhinitis.    levocetirizine (XYZAL) 5 MG tablet TAKE ONE TABLET BY MOUTH EVERY MORNING    levothyroxine (SYNTHROID) 50 MCG tablet Take 1 tablet (50 mcg total) by mouth once daily.    lisinopril (PRINIVIL,ZESTRIL) 40 MG tablet Take 1 tablet (40 mg total) by mouth once daily. (Patient taking differently: Take 20 mg by mouth once daily. )    METAMUCIL 0.52 gram capsule TAKE 2 TO 6 CAPSULES BY MOUTH DAILY OR TAKE 5 CAPSULES UP TO FOUR  TIMES DAILY    metoprolol succinate (TOPROL-XL) 25 MG 24 hr tablet Take 25 mg by mouth once daily.    nitroGLYCERIN (NITROSTAT) 0.4 MG SL tablet Place 0.4 mg under the tongue every 5 (five) minutes as needed for Chest pain.    olopatadine (PATADAY) 0.2 % Drop Place 1 drop into both eyes once daily.    PSYLLIUM HUSK/CALCIUM CARB (METAMUCIL PLUS CALCIUM ORAL) Take by mouth.    rosuvastatin (CRESTOR) 5 MG tablet Take 5 mg by mouth once daily.    solifenacin (VESICARE) 10 MG tablet Take 1 tablet (10 mg total) by mouth once daily.    sucralfate (CARAFATE) 1 gram tablet Take 1 g by mouth 4 (four) times daily.    tramadol (ULTRAM) 50 mg tablet Take 50 mg by mouth every 6 (six) hours as needed for Pain.    trolamine salicylate (ASPERCREME) 10 % cream Apply topically as needed.     Family History     None        Social History Main Topics    Smoking status: Never Smoker    Smokeless tobacco: Never Used    Alcohol use No    Drug use: No    Sexual activity: No     Review of Systems   Constitutional: Positive for chills and fatigue. Negative for diaphoresis and fever.   HENT: Positive for congestion, postnasal drip, sore throat and trouble swallowing. Negative for ear pain and voice change.    Eyes: Positive for visual disturbance. Negative for photophobia and pain.        Chronic blurred vision; blind left eye   Respiratory: Negative for cough, shortness of breath and wheezing.    Cardiovascular: Negative for chest pain, palpitations and leg swelling.   Gastrointestinal: Negative for abdominal pain, nausea and vomiting.   Endocrine: Negative for cold intolerance and heat intolerance.   Genitourinary: Negative for dysuria, frequency and urgency.   Musculoskeletal: Negative for arthralgias, gait problem and myalgias.   Skin: Negative for color change, pallor and rash.   Neurological: Negative for dizziness, weakness and headaches.   Hematological: Does not bruise/bleed easily.   Psychiatric/Behavioral: Negative for  agitation, confusion and hallucinations.     Objective:     Vital Signs (Most Recent):  Temp: 97.3 °F (36.3 °C) (09/03/17 2329)  Pulse: 65 (09/03/17 2356)  Resp: 20 (09/03/17 2329)  BP: (!) 142/67 (09/03/17 2356)  SpO2: 95 % (09/03/17 2341) Vital Signs (24h Range):  Temp:  [97.3 °F (36.3 °C)-98.6 °F (37 °C)] 97.3 °F (36.3 °C)  Pulse:  [64-81] 65  Resp:  [16-20] 20  SpO2:  [95 %-98 %] 95 %  BP: (120-193)/(56-87) 142/67     Weight: 77.2 kg (170 lb 3.1 oz)  Body mass index is 33.24 kg/m².    Physical Exam   Constitutional: She is oriented to person, place, and time. She appears well-developed and well-nourished. No distress.   HENT:   Head: Normocephalic and atraumatic.   Right Ear: Decreased hearing is noted.   Left Ear: Decreased hearing is noted.   Mouth/Throat: Oropharynx is clear and moist.   Eyes: Conjunctivae are normal. Pupils are equal, round, and reactive to light. No scleral icterus.   glasses   Neck: Normal range of motion. Neck supple. No JVD present.   Cardiovascular: Normal rate, regular rhythm and intact distal pulses.    Pulmonary/Chest: Effort normal and breath sounds normal. No respiratory distress. She has no wheezes.   Abdominal: Soft. Bowel sounds are normal. She exhibits no distension. There is no tenderness.   Musculoskeletal: Normal range of motion. She exhibits edema. She exhibits no tenderness.   2+ pitting edema to bilateral lower extremities.   Neurological: She is alert and oriented to person, place, and time. She has normal strength. GCS eye subscore is 4. GCS verbal subscore is 5. GCS motor subscore is 6.   Skin: Skin is warm, dry and intact.   Psychiatric: She has a normal mood and affect. Her speech is normal and behavior is normal.   Nursing note and vitals reviewed.       Significant Labs:   CBC:   Recent Labs  Lab 09/03/17  1439   WBC 8.99   HGB 9.0*   HCT 26.7*        CMP:   Recent Labs  Lab 09/03/17  1439   *   K 4.5   CL 93*   CO2 26      BUN 13   CREATININE  1.0   CALCIUM 8.8   PROT 6.8   ALBUMIN 3.2*   BILITOT 0.5   ALKPHOS 104   *   *   ANIONGAP 7*   EGFRNONAA 51*     Troponin:   Recent Labs  Lab 09/03/17  1439   TROPONINI <0.006     TSH:   Recent Labs  Lab 09/03/17  1439   TSH 8.242*     Free T4 0.71 - 1.51 ng/dL 1.22        Urine Studies:   Recent Labs  Lab 09/03/17  1621   COLORU Yellow   APPEARANCEUA Clear   PHUR 7.0   SPECGRAV 1.010   PROTEINUA Trace*   GLUCUA Negative   KETONESU Negative   BILIRUBINUA Negative   OCCULTUA Negative   NITRITE Negative   UROBILINOGEN Negative   LEUKOCYTESUR Negative     Sodium Urine Random 20 - 250 mmol/L 55        All pertinent labs within the past 24 hours have been reviewed.    Significant Imaging: I have reviewed all pertinent imaging results/findings within the past 24 hours.     X-Ray Chest PA And Lateral: Status post CABG. No acute cardiopulmonary disease.    Assessment/Plan:     * Hyponatremia    Serum sodium 126 on admission, down from 131 on 7/21/17.  Gently hydrate.  Monitor on telemetry.  Repeat level in a.m.          Normochromic normocytic anemia    Hgb 9, Hct 26.7 (9.3/28.1 on 7/15/17).  Continue to monitor.          Abnormal liver enzymes     (up from 116 on 7/21/17),  (up from 110 on 7/21/17).  Hold crestor.  Avoid hepatotoxins.  Continue to monitor.          History of coronary artery bypass graft x 2    Continue ASA therapy, holding statin due to elevated LFTs.          CKD (chronic kidney disease) stage 3, GFR 30-59 ml/min    Chronic; Stable.  Avoid nephrotoxins.  Continue to monitor.          Urge incontinence    Continue vesicare 10 mg daily.          Primary open-angle glaucoma, bilateral, moderate stage    Continue trusopt and lumigan eye gtts.          Allergic rhinitis due to allergen    Takes levocetirizine 5 mg daily and atrovent nasal spray BID at home.  Levocetirizine not available in hospital formulary.  Resume nasal spray.  Give cetirizine 10 mg daily.           Hypothyroidism    Continue levothyroxine.          Essential hypertension    Continue lisinopril, hydralazine, and metoprolol.  Continue to monitor.            VTE Risk Mitigation         Ordered     Medium Risk of VTE  Once      09/03/17 1747     enoxaparin injection 40 mg  Daily     Route:  Subcutaneous        09/03/17 3013             Anthony Carney NP  Department of Hospital Medicine   [facility]

## 2017-09-04 NOTE — PLAN OF CARE
Pt lying in bed, no acute distress noted. Pt remains on tele, SR in 60s. No complaints overnight. Bed alarm on, call light in reach, fall precautions in place. Report given to oncoming nurse.

## 2017-09-04 NOTE — ASSESSMENT & PLAN NOTE
Takes levocetirizine 5 mg daily and atrovent nasal spray BID at home.  Levocetirizine not available in hospital formulary.  Resume nasal spray.  Give cetirizine 10 mg daily.

## 2017-09-04 NOTE — PROGRESS NOTES
[facility] Hospital Medicine  Progress Note    Patient Name: Alyssa Hastings  MRN: [id number]  Patient Class: OP- Observation   Admission Date: 9/3/2017  Length of Stay: 0 days  Attending Physician: You Dixon MD  Primary Care Provider: Ashley Johnson MD        Subjective:     Principal Problem:Hyponatremia    HPI:  Alyssa Hastings is a 85 y/o  female with seasonal allergies, GERD, HTN, hypothyroidism, CKD stage III, type 2 DM, glaucoma, and CAD s/p CABG x 2.  Her PCP is Dr. Ashley Johnson.    Presented to ED on 9/3/17 with complaints of sore throat beginning the night of 9/2/17 accompanied by chills, fatigue, occasional headache, and chronic nasal congestion.  Denies fever, cough, sputum production, chest pain.  Sore throat worsened with oral intake.  No alleviating factors identified.    Upon arrival to ED patient with Hgb 9, Hct 26.7, sodium 126, chloride 93.  No acute abnormailites seen on CXR.  She was given 1L NS bolus in ED.  Admitted to Hospital medicine service with hyponatremia.    Hospital Course:  She was given gentle hydration with IVFs (NS) overnight with increase in serum sodium to 130 (Baseline ~ 131).      Interval History: NAEON.  Patient resting comfortably this morning.  Still has complaints of sore throat.  Denies fever, chills, chest pain.  Will discontinue IVFs.  Likely discharge home to f/u with PCP.    Review of Systems   Constitutional: Negative for chills and fever.   HENT: Positive for postnasal drip and sore throat.    Respiratory: Negative for cough, chest tightness and shortness of breath.    Cardiovascular: Negative for chest pain, palpitations and leg swelling.   Gastrointestinal: Negative for abdominal pain, nausea and vomiting.     Objective:     Vital Signs (Most Recent):  Temp: 96.7 °F (35.9 °C) (09/04/17 0353)  Pulse: 66 (09/04/17 0526)  Resp: 20 (09/04/17 0353)  BP: (!) 164/72 (09/04/17 0526)  SpO2: 96 % (09/04/17 0340) Vital Signs (24h  Range):  Temp:  [96.7 °F (35.9 °C)-98.6 °F (37 °C)] 96.7 °F (35.9 °C)  Pulse:  [64-81] 66  Resp:  [16-20] 20  SpO2:  [95 %-98 %] 96 %  BP: (120-193)/(56-87) 164/72     Weight: 73.3 kg (161 lb 9.6 oz)  Body mass index is 31.56 kg/m².    Intake/Output Summary (Last 24 hours) at 09/04/17 0646  Last data filed at 09/04/17 0254   Gross per 24 hour   Intake              250 ml   Output              775 ml   Net             -525 ml      Physical Exam   Constitutional: She appears well-developed and well-nourished. No distress.   Cardiovascular: Normal rate, regular rhythm and intact distal pulses.    Pulmonary/Chest: Effort normal and breath sounds normal. No respiratory distress. She has no wheezes.   Abdominal: Soft. Bowel sounds are normal. She exhibits no distension. There is no tenderness.   Nursing note and vitals reviewed.      Significant Labs:   CBC:   Recent Labs  Lab 09/03/17  1439 09/04/17  0320   WBC 8.99 5.98   HGB 9.0* 9.4*   HCT 26.7* 27.9*    222     CMP:   Recent Labs  Lab 09/03/17  1439 09/04/17  0320   * 130*   K 4.5 3.9   CL 93* 99   CO2 26 25    91   BUN 13 11   CREATININE 1.0 0.8   CALCIUM 8.8 8.5*   PROT 6.8 6.1   ALBUMIN 3.2* 2.9*   BILITOT 0.5 0.5   ALKPHOS 104 91   * 164*   * 141*   ANIONGAP 7* 6*   EGFRNONAA 51* >60     All pertinent labs within the past 24 hours have been reviewed.    Significant Imaging: I have reviewed all pertinent imaging results/findings within the past 24 hours.     X-Ray Chest PA And Lateral: Status post CABG. No acute cardiopulmonary disease.    Assessment/Plan:      * Hyponatremia    Serum sodium 126 on admission, down from 131 on 7/21/17.  Gently hydrated overnight with NS with improvement in sodium to 130.  Likely discharge home today; f/u with pCP as outpatient.          Normochromic normocytic anemia    Stable.  Continue to monitor.          Abnormal liver enzymes    Slightly improved this morning; but remains elevated (chronic  issue). Continue to Hold crestor 5 mg daily.  Avoid hepatotoxins.  F/u with PCP.          History of coronary artery bypass graft x 2    Continue ASA therapy, holding statin due to elevated LFTs.          CKD (chronic kidney disease) stage 3, GFR 30-59 ml/min    Chronic; Stable.  Avoid nephrotoxins.  Continue to monitor.          Urge incontinence    Continue vesicare 10 mg daily.          Primary open-angle glaucoma, bilateral, moderate stage    Continue trusopt and lumigan eye gtts.          Allergic rhinitis due to allergen    Takes levocetirizine 5 mg daily and atrovent nasal spray BID at home.  Levocetirizine not available in hospital formulary.  Resume nasal spray.  Give cetirizine 10 mg daily.          Hypothyroidism    Continue levothyroxine.          Essential hypertension    Continue lisinopril, hydralazine, and metoprolol.  Continue to monitor.            VTE Risk Mitigation         Ordered     Medium Risk of VTE  Once      09/03/17 1747     enoxaparin injection 40 mg  Daily     Route:  Subcutaneous        09/03/17 1750              Anthony Carney NP  Department of Hospital Medicine   [facility]

## 2017-09-04 NOTE — HOSPITAL COURSE
She was given gentle hydration with IVFs (NS) overnight with increase in serum sodium to 130 (Baseline ~ 131).  LFTs remained elevated, crestor was discontinued.  She was discharged home to f/u with PCP.

## 2017-09-04 NOTE — ASSESSMENT & PLAN NOTE
Serum sodium 126 on admission, down from 131 on 7/21/17.  Gently hydrated overnight with NS with improvement in sodium to 130.  Likely discharge home today; f/u with pCP as outpatient.

## 2017-09-04 NOTE — ASSESSMENT & PLAN NOTE
Serum sodium 126 on admission, down from 131 on 7/21/17.  Gently hydrate.  Monitor on telemetry.  Repeat level in a.m.

## 2017-09-04 NOTE — ASSESSMENT & PLAN NOTE
(up from 116 on 7/21/17),  (up from 110 on 7/21/17).  Hold crestor.  Avoid hepatotoxins.  Continue to monitor.

## 2017-09-04 NOTE — SUBJECTIVE & OBJECTIVE
Interval History: NAEON.  Patient resting comfortably this morning.  Still has complaints of sore throat.  Denies fever, chills, chest pain.  Will discontinue IVFs.  Likely discharge home to f/u with PCP.    Review of Systems   Constitutional: Negative for chills and fever.   HENT: Positive for postnasal drip and sore throat.    Respiratory: Negative for cough, chest tightness and shortness of breath.    Cardiovascular: Negative for chest pain, palpitations and leg swelling.   Gastrointestinal: Negative for abdominal pain, nausea and vomiting.     Objective:     Vital Signs (Most Recent):  Temp: 96.7 °F (35.9 °C) (09/04/17 0353)  Pulse: 66 (09/04/17 0526)  Resp: 20 (09/04/17 0353)  BP: (!) 164/72 (09/04/17 0526)  SpO2: 96 % (09/04/17 0340) Vital Signs (24h Range):  Temp:  [96.7 °F (35.9 °C)-98.6 °F (37 °C)] 96.7 °F (35.9 °C)  Pulse:  [64-81] 66  Resp:  [16-20] 20  SpO2:  [95 %-98 %] 96 %  BP: (120-193)/(56-87) 164/72     Weight: 73.3 kg (161 lb 9.6 oz)  Body mass index is 31.56 kg/m².    Intake/Output Summary (Last 24 hours) at 09/04/17 0646  Last data filed at 09/04/17 0254   Gross per 24 hour   Intake              250 ml   Output              775 ml   Net             -525 ml      Physical Exam   Constitutional: She appears well-developed and well-nourished. No distress.   Cardiovascular: Normal rate, regular rhythm and intact distal pulses.    Pulmonary/Chest: Effort normal and breath sounds normal. No respiratory distress. She has no wheezes.   Abdominal: Soft. Bowel sounds are normal. She exhibits no distension. There is no tenderness.   Nursing note and vitals reviewed.      Significant Labs:   CBC:   Recent Labs  Lab 09/03/17  1439 09/04/17  0320   WBC 8.99 5.98   HGB 9.0* 9.4*   HCT 26.7* 27.9*    222     CMP:   Recent Labs  Lab 09/03/17  1439 09/04/17  0320   * 130*   K 4.5 3.9   CL 93* 99   CO2 26 25    91   BUN 13 11   CREATININE 1.0 0.8   CALCIUM 8.8 8.5*   PROT 6.8 6.1   ALBUMIN 3.2*  2.9*   BILITOT 0.5 0.5   ALKPHOS 104 91   * 164*   * 141*   ANIONGAP 7* 6*   EGFRNONAA 51* >60     All pertinent labs within the past 24 hours have been reviewed.    Significant Imaging: I have reviewed all pertinent imaging results/findings within the past 24 hours.     X-Ray Chest PA And Lateral: Status post CABG. No acute cardiopulmonary disease.

## 2017-09-04 NOTE — HPI
Alyssa Hastings is a 87 y/o  female with seasonal allergies, GERD, HTN, hypothyroidism, CKD stage III, type 2 DM, glaucoma, and CAD s/p CABG x 2.  Her PCP is Dr. Ashley Johnson.    Presented to ED on 9/3/17 with complaints of sore throat beginning the night of 9/2/17 accompanied by chills, fatigue, occasional headache, and chronic nasal congestion.  Denies fever, cough, sputum production, chest pain.  Sore throat worsened with oral intake.  No alleviating factors identified.    Upon arrival to ED patient with Hgb 9, Hct 26.7, sodium 126, chloride 93.  No acute abnormailites seen on CXR.  She was given 1L NS bolus in ED.  Admitted to Hospital medicine service with hyponatremia.

## 2017-09-04 NOTE — PLAN OF CARE
Problem: Patient Care Overview  Goal: Plan of Care Review  Outcome: Outcome(s) achieved Date Met: 09/04/17  Discharge instructions reviewed with patient and patient's son. Verbalized understanding. PIV discontinued, catheter tip intact. Telemetry DC'd and returned to tele tech. Patient discharged home with son to assisted living.

## 2017-09-07 ENCOUNTER — CLINICAL SUPPORT (OUTPATIENT)
Dept: AUDIOLOGY | Facility: CLINIC | Age: 82
End: 2017-09-07
Payer: COMMERCIAL

## 2017-09-07 ENCOUNTER — HOSPITAL ENCOUNTER (EMERGENCY)
Facility: HOSPITAL | Age: 82
Discharge: HOME | End: 2017-09-08
Attending: EMERGENCY MEDICINE
Payer: MEDICARE

## 2017-09-07 DIAGNOSIS — S09.90XA HEAD INJURY, INITIAL ENCOUNTER: Primary | ICD-10-CM

## 2017-09-07 DIAGNOSIS — H90.3 SENSORINEURAL HEARING LOSS, BILATERAL: Primary | ICD-10-CM

## 2017-09-07 PROCEDURE — 99499 UNLISTED E&M SERVICE: CPT | Mod: S$GLB,,, | Performed by: OTOLARYNGOLOGY

## 2017-09-07 PROCEDURE — 99283 EMERGENCY DEPT VISIT LOW MDM: CPT

## 2017-09-07 PROCEDURE — 25000003 PHARM REV CODE 250: Performed by: EMERGENCY MEDICINE

## 2017-09-07 RX ORDER — ACETAMINOPHEN 325 MG/1
650 TABLET ORAL
Status: COMPLETED | OUTPATIENT
Start: 2017-09-07 | End: 2017-09-07

## 2017-09-07 RX ADMIN — ACETAMINOPHEN 650 MG: 325 TABLET ORAL at 11:09

## 2017-09-08 VITALS
OXYGEN SATURATION: 100 % | SYSTOLIC BLOOD PRESSURE: 195 MMHG | RESPIRATION RATE: 16 BRPM | WEIGHT: 170 LBS | TEMPERATURE: 97 F | DIASTOLIC BLOOD PRESSURE: 83 MMHG | BODY MASS INDEX: 33.38 KG/M2 | HEIGHT: 60 IN | HEART RATE: 58 BPM

## 2017-09-08 NOTE — ED PROVIDER NOTES
Encounter Date: 9/7/2017       History     Chief Complaint   Patient presents with    Fall     pt. wheeled to triage desk with son. pt. lives at assisted living. son states pt. ambulates with assistance from walker. pt. ambulated without walker tonight, fell backweards and hit top of head on chair. presents with small bruise to top of scalp. no bleeding or open wound present.      Patient lost her balance and fell this evening striking her head.  No LOC.  Mild headache.  No other injury.  No neuro symptoms.  Swelling is improved.          Review of patient's allergies indicates:   Allergen Reactions    Actos [pioglitazone]     Amlodipine     Bentyl [dicyclomine]     Benzocaine     Declomycin [demeclocycline]     Doxycycline     Erythropoietin analogues     Gabapentin     Hydrocodone     Lipitor [atorvastatin]     Losartan     Metronidazole     Nortriptyline     Oxybutynin     Pcn [penicillins]     Potassium     Pravachol [pravastatin]     Procaine     Promethazine     Propranolol     Ramipril     Sulfa (sulfonamide antibiotics)      Past Medical History:   Diagnosis Date    Coronary artery disease     Diabetes mellitus     GERD (gastroesophageal reflux disease)     Glaucoma     Hypertension     Stroke     Thyroid disease      Past Surgical History:   Procedure Laterality Date    APPENDECTOMY      CARDIAC SURGERY      CHOLECYSTECTOMY      EYE SURGERY      HYSTERECTOMY       Family History   Problem Relation Age of Onset    Prostate cancer Neg Hx     Kidney disease Neg Hx      Social History   Substance Use Topics    Smoking status: Never Smoker    Smokeless tobacco: Never Used    Alcohol use No     Review of Systems   Constitutional: Negative for fever.   Respiratory: Negative for shortness of breath.    Cardiovascular: Negative for chest pain and palpitations.   Gastrointestinal: Negative for abdominal pain.   Musculoskeletal: Negative for back pain and neck pain.        Physical Exam     Initial Vitals [09/07/17 2150]   BP Pulse Resp Temp SpO2   (!) 168/72 73 20 98.7 °F (37.1 °C) 97 %      MAP       104         Physical Exam    Constitutional: She appears well-developed and well-nourished.   HENT:   Head: Normocephalic.   Contusion to occiput   Neck: Normal range of motion. No spinous process tenderness and no muscular tenderness present.   Musculoskeletal:        Right shoulder: She exhibits no tenderness.        Left shoulder: She exhibits no tenderness.        Right elbow: No tenderness found.        Left elbow: No tenderness found.        Right wrist: She exhibits no bony tenderness.        Left wrist: She exhibits no tenderness.        Right hip: She exhibits no tenderness.        Left hip: She exhibits no tenderness.        Right knee: No tenderness found.        Left knee: No tenderness found.        Right ankle: No tenderness.        Left ankle: No tenderness.   Neurological: She is alert. She has normal strength. No cranial nerve deficit or sensory deficit. GCS eye subscore is 4. GCS verbal subscore is 5. GCS motor subscore is 6.         ED Course   Procedures  Labs Reviewed - No data to display     Imaging Results          CT Head Without Contrast (Final result)  Result time 09/08/17 00:14:53    Final result by Evelina Andrew MD (09/08/17 00:14:53)                 Impression:      1.  Midline posterior parietal scalp hematoma.  No CT evidence of acute intracranial abnormality.  Clinical correlation and further evaluation as warranted.    2.  Generalized cerebral volume loss, chronic microvascular ischemic change, and remote lacunar type infarcts, similar to prior study.      Electronically signed by: EVELINA ANDREW  Date:     09/08/17  Time:    00:14              Narrative:    TECHNIQUE: Multiple contiguous axial images of the brain were obtained from base to the vertex without the use of intravenous contrast. Sagittal and coronal reconstruction images were formatted  in postprocessing.    COMPARISON: Head CT 5/6/2017    Findings:      There soft tissue swelling and small soft tissue hematoma involving the midline posterior parietal scalp soft tissues.  The underlying osseous calvarium is intact.  There is no evidence of acute intra-extra axial hemorrhage, hydrocephalus, midline shift or mass effect.  There is generalized cerebral volume loss.  There are patchy regions of hypoattenuation in the deep cerebral and periventricular white matter which are non-specific but likely reflect sequela of chronic microvascular ischemic change.  Additionally, there are remote basal ganglia lacunar type infarcts.  Basilar cisterns are patent.  There is atherosclerotic plaquing of the cavernous and supraclinoid segments of the ICA as well as the V4 segments of the vertebral arteries.  Postoperative change of the globes.  There is degenerative change about the dens. Paranasal sinuses and mastoid air cells are clear of acute process.                                                 ED Course      Clinical Impression:   The encounter diagnosis was Head injury, initial encounter.    Disposition:   Disposition: Discharged  Condition: Stable                        Gabo Mark MD  09/08/17 0519

## 2017-09-08 NOTE — PROGRESS NOTES
Ms. Hastings was seen for a hearing aid consultation.  I completed an audiogram since she did not have one.  She is ordering two smsPREP OPN 1 premium digital hearing aids.  The aids will be dispensed in two weeks.

## 2017-09-08 NOTE — ED TRIAGE NOTES
Patient presents to the ED with reports of having fallen backwards and struck the to of her head on a chair. Hematoma noted. No abrasion or laceration. Denies any LOC, dizziness, lightheadedness, or c-spine tenderness. Patient states she normally ambulated with the assistance of a walker but was not using it when the fall occurred.     Review of patient's allergies indicates:   Allergen Reactions    Actos [pioglitazone]     Amlodipine     Bentyl [dicyclomine]     Benzocaine     Declomycin [demeclocycline]     Doxycycline     Erythropoietin analogues     Gabapentin     Hydrocodone     Lipitor [atorvastatin]     Losartan     Metronidazole     Nortriptyline     Oxybutynin     Pcn [penicillins]     Potassium     Pravachol [pravastatin]     Procaine     Promethazine     Propranolol     Ramipril     Sulfa (sulfonamide antibiotics)         Patient has verified the spelling of their name and  on armband.   APPEARANCE: Patient is alert, calm, oriented x 4, and does not appear distressed.  SKIN: Skin is normal for race, warm, and dry. Normal skin turgor and mucous membranes moist. Bruising and swelling noted to the right occiptal area of the scalp  CARDIAC: Normal rate and no murmur heard.   RESPIRATORY:Normal rate and effort. Breath sounds clear bilaterally throughout chest. Respirations are equal and unlabored.    GASTRO: Bowel sounds normal, abdomen is soft, no tenderness, and no abdominal distention.  MUSCLE: Full ROM. No bony tenderness or soft tissue tenderness. No obvious deformity.  PERIPHERAL VASCULAR: peripheral pulses present. Normal cap refill. No edema. Warm to touch.  NEURO: 5/5 strength major flexors/extensors bilaterally. Sensory intact to light touch bilaterally. GCS 15. No neurological abnormalities.   MENTAL STATUS: awake, alert and aware of environment.  EYE: PERRL, both eyes: pupils brisk and reactive to light. Normal size.  ENT: EARS: no obvious drainage. NOSE: no active  bleeding. THROAT: no redness or swelling.

## 2017-09-08 NOTE — ED NOTES
Physician at bedside and is speaking to the patient of results and current vital signs. Reports patient is cleared for discharge home.

## 2017-09-11 ENCOUNTER — OFFICE VISIT (OUTPATIENT)
Dept: UROLOGY | Facility: CLINIC | Age: 82
End: 2017-09-11
Payer: MEDICARE

## 2017-09-11 VITALS
HEIGHT: 60 IN | BODY MASS INDEX: 33.38 KG/M2 | WEIGHT: 170 LBS | SYSTOLIC BLOOD PRESSURE: 130 MMHG | HEART RATE: 67 BPM | DIASTOLIC BLOOD PRESSURE: 67 MMHG

## 2017-09-11 DIAGNOSIS — R53.83 FATIGUE, UNSPECIFIED TYPE: ICD-10-CM

## 2017-09-11 DIAGNOSIS — N18.30 CRF (CHRONIC RENAL FAILURE), STAGE 3 (MODERATE): ICD-10-CM

## 2017-09-11 DIAGNOSIS — N32.81 OAB (OVERACTIVE BLADDER): Primary | ICD-10-CM

## 2017-09-11 PROCEDURE — 99999 PR PBB SHADOW E&M-EST. PATIENT-LVL III: CPT | Mod: PBBFAC,,, | Performed by: UROLOGY

## 2017-09-11 PROCEDURE — 3008F BODY MASS INDEX DOCD: CPT | Mod: S$GLB,,, | Performed by: UROLOGY

## 2017-09-11 PROCEDURE — 1125F AMNT PAIN NOTED PAIN PRSNT: CPT | Mod: S$GLB,,, | Performed by: UROLOGY

## 2017-09-11 PROCEDURE — 99214 OFFICE O/P EST MOD 30 MIN: CPT | Mod: 25,S$GLB,, | Performed by: UROLOGY

## 2017-09-11 PROCEDURE — 81001 URINALYSIS AUTO W/SCOPE: CPT | Mod: S$GLB,,, | Performed by: UROLOGY

## 2017-09-11 PROCEDURE — 1159F MED LIST DOCD IN RCRD: CPT | Mod: S$GLB,,, | Performed by: UROLOGY

## 2017-09-11 NOTE — PROGRESS NOTES
This patient was last seen by me in July of this year for evaluation of overactive bladder.  At that time she was given a trial of Vesicare.  States that she did get clearance from her eye doctor for use of the Vesicare.  Patient gives a history of being ALLERGY to oxybutynin    She now comes in follow-up and states that the Vesicare didn't help somewhat with her frequency of urination were she states that she stopped using it, she felt that she was not voiding frequently enough.    At her last office visit note was made of chronic renal failure.  Patient did not have the ordered ultrasound performed however has been seen subsequently by her PCP and note is made for referral for nephrology.  She has not yet seen the nephrologist.    Patient also complains of fatigue    Physical exam reveals reveals a well-developed well-nourished patient  in no acute distress.  Patient is alert and oriented ×3 with normal mood and affect.  Respiratory effort is normal and there is no peripheral edema.  Skin is normal to inspection and palpation    /67   Pulse 67   Ht 5' (1.524 m)   Wt 77.1 kg (170 lb)   BMI 33.20 kg/m²   Review of Systems  General ROS: negative for chills, fever or weight loss  Respiratory ROS: no cough, shortness of breath, or wheezing  Cardiovascular ROS: no chest pain or dyspnea on exertion  Musculoskeletal ROS: negative for gait disturbance or muscular weakness    Family History   Problem Relation Age of Onset    Prostate cancer Neg Hx     Kidney disease Neg Hx      Past Medical History:   Diagnosis Date    Coronary artery disease     Diabetes mellitus     GERD (gastroesophageal reflux disease)     Glaucoma     Hypertension     Stroke     Thyroid disease      Family History   Problem Relation Age of Onset    Prostate cancer Neg Hx     Kidney disease Neg Hx      Social History   Substance Use Topics    Smoking status: Never Smoker    Smokeless tobacco: Never Used    Alcohol use No        Impression:      ICD-10-CM ICD-9-CM    1. OAB (overactive bladder) N32.81 596.51 POCT urinalysis, dipstick or tablet reag   2. Fatigue, unspecified type R53.83 780.79    3. CRF (chronic renal failure), stage 3 (moderate) N18.3 585.3 Ambulatory referral to Nephrology       Plan:    #1.  Overactive bladder.  Plan.  Patient states that she does not want therapy for overactive bladder at this time.  The Vesicare didn't help with her symptoms, I recommended that she restart the Vesicare when she does want treatment for her symptoms    #2.  Fatigue.  Plan.  I recommend that she follow up with her PCP concerning this    #3.  Chronic renal failure.  Plan.  Referral made to nephrology    This point follow-up with me is on an as-needed basis    Today I spent 25 minutes with the patient, more than 50% of which was spent counseling and coordinating care concerning treatment of issues as detailed above    PLEASE NOTE:  Please be advised that portions of this note were dictated using voice recognition software and may contain dictation related errors in spelling/grammar/appropriate pronouns/syntax or other errors that might have not been found and or corrected on text review.

## 2017-09-12 LAB
BILIRUB SERPL-MCNC: NORMAL MG/DL
BLOOD URINE, POC: NORMAL
COLOR, POC UA: NORMAL
GLUCOSE UR QL STRIP: NORMAL
KETONES UR QL STRIP: NORMAL
LEUKOCYTE ESTERASE URINE, POC: NORMAL
NITRITE, POC UA: NORMAL
PH, POC UA: 5
PROTEIN, POC: NORMAL
SPECIFIC GRAVITY, POC UA: 1.01
UROBILINOGEN, POC UA: NORMAL

## 2017-09-13 ENCOUNTER — EXTERNAL VISIT (OUTPATIENT)
Dept: FAMILY MEDICINE | Facility: CLINIC | Age: 82
End: 2017-09-13
Payer: COMMERCIAL

## 2017-09-13 VITALS
DIASTOLIC BLOOD PRESSURE: 67 MMHG | WEIGHT: 166 LBS | SYSTOLIC BLOOD PRESSURE: 160 MMHG | BODY MASS INDEX: 32.42 KG/M2 | HEART RATE: 63 BPM | TEMPERATURE: 98 F

## 2017-09-13 DIAGNOSIS — E87.1 HYPONATREMIA: ICD-10-CM

## 2017-09-13 DIAGNOSIS — R74.8 ABNORMAL LIVER ENZYMES: ICD-10-CM

## 2017-09-13 DIAGNOSIS — N18.30 CKD (CHRONIC KIDNEY DISEASE) STAGE 3, GFR 30-59 ML/MIN: ICD-10-CM

## 2017-09-13 DIAGNOSIS — R53.83 FATIGUE, UNSPECIFIED TYPE: Primary | ICD-10-CM

## 2017-09-13 DIAGNOSIS — E03.9 HYPOTHYROIDISM, UNSPECIFIED TYPE: ICD-10-CM

## 2017-09-13 PROCEDURE — 99349 HOME/RES VST EST MOD MDM 40: CPT | Mod: ,,, | Performed by: FAMILY MEDICINE

## 2017-09-13 NOTE — PROGRESS NOTES
Subjective:       Patient ID: Alyssa Hastings is a 86 y.o. female.    Chief Complaint: Hypertension and Constipation    HPI 86 year old female seen today at [facility] f/u, high blood pressure, and constipation. poatient was admitted for dehydration, elevated liver enzymes. She was taken off of crestor.   Patient has ckd. She has a nephrology referral placed.   Patient states she has been extremely fatigued for 2 weeks. She has interrupted sleep due to her incontinence. She states her energy level has decreased.   Patient has chronic constipation. She states dulcolax helps her have a bowel movement. She takes metamucil and colace prn.     Review of Systems   Constitutional: Negative for chills and fever.   Respiratory: Negative for chest tightness and shortness of breath.    Cardiovascular: Negative for chest pain and leg swelling.   Gastrointestinal: Positive for constipation. Negative for abdominal pain, diarrhea, nausea and vomiting.   Genitourinary: Positive for frequency. Negative for dysuria and hematuria.       Objective:      Vitals:    09/13/17 1255   BP: (!) 160/67   Pulse: 63   Temp: 97.6 °F (36.4 °C)     Physical Exam   Constitutional: She appears well-developed and well-nourished. No distress.   HENT:   Mouth/Throat: Oropharynx is clear and moist. No oropharyngeal exudate.   Cardiovascular: Normal rate and regular rhythm.    Pulmonary/Chest: Effort normal. She has no wheezes.   Abdominal: Soft. There is no tenderness. There is no guarding.   Musculoskeletal: She exhibits edema.   Bilateral 1+ pitting edema up to knees.   No calf tenderness bilaterally   Neurological: She is alert.   Skin: She is not diaphoretic.   Psychiatric: Judgment and thought content normal.   Vitals reviewed.        Labs done from 9/11  ast 169  Alt 119  Sodium 125  Creatinine 1.15  BUN 14  GFR 47    Assessment:       1. Fatigue, unspecified type    2. Abnormal liver enzymes    3. Hypothyroidism,  unspecified type    4. CKD (chronic kidney disease) stage 3, GFR 30-59 ml/min    5. Hyponatremia        Plan:         1. Fatigue: will check labs. Patient advised on sunlight exposure and participating with group activites.  2. ckd stage 3: nephrology referral in place.  3. Hypothyroidism: check tsh  4. Constipation: patient advised on daily metamucil and dulcolax prn  5. Transaminitis, hyponatremia: patient advised to increase water intake. She has had persistent hypnatremia since 05/2017. Unsure of etiology as she is not on ssri or diuretics. Will check tsh. Monitor.   htn is uncontrolled. She is on metoprolol, hydralazine and lisinopril.   Will f/u in 2 weeks for bp control.   Fatigue, unspecified type  -     CBC auto differential; Future; Expected date: 09/13/2017  -     TSH; Future; Expected date: 09/13/2017    Abnormal liver enzymes    Hypothyroidism, unspecified type    CKD (chronic kidney disease) stage 3, GFR 30-59 ml/min    Hyponatremia      Return in about 2 weeks (around 9/27/2017).

## 2017-09-21 ENCOUNTER — LAB VISIT (OUTPATIENT)
Dept: LAB | Facility: HOSPITAL | Age: 82
End: 2017-09-21
Attending: FAMILY MEDICINE
Payer: MEDICARE

## 2017-09-21 ENCOUNTER — OFFICE VISIT (OUTPATIENT)
Dept: GASTROENTEROLOGY | Facility: CLINIC | Age: 82
End: 2017-09-21
Payer: COMMERCIAL

## 2017-09-21 VITALS
WEIGHT: 166 LBS | HEIGHT: 60 IN | HEART RATE: 65 BPM | SYSTOLIC BLOOD PRESSURE: 157 MMHG | BODY MASS INDEX: 32.59 KG/M2 | DIASTOLIC BLOOD PRESSURE: 70 MMHG

## 2017-09-21 DIAGNOSIS — K59.00 CONSTIPATION, UNSPECIFIED CONSTIPATION TYPE: Primary | ICD-10-CM

## 2017-09-21 DIAGNOSIS — R74.8 ABNORMAL LIVER ENZYMES: ICD-10-CM

## 2017-09-21 DIAGNOSIS — R53.83 FATIGUE, UNSPECIFIED TYPE: ICD-10-CM

## 2017-09-21 LAB
BASOPHILS # BLD AUTO: ABNORMAL K/UL
BASOPHILS NFR BLD: 0 %
DIFFERENTIAL METHOD: ABNORMAL
EOSINOPHIL # BLD AUTO: ABNORMAL K/UL
EOSINOPHIL NFR BLD: 0 %
ERYTHROCYTE [DISTWIDTH] IN BLOOD BY AUTOMATED COUNT: 14.6 %
HCT VFR BLD AUTO: 26.4 %
HGB BLD-MCNC: 9.1 G/DL
LYMPHOCYTES # BLD AUTO: ABNORMAL K/UL
LYMPHOCYTES NFR BLD: 6 %
MCH RBC QN AUTO: 30.7 PG
MCHC RBC AUTO-ENTMCNC: 34.5 G/DL
MCV RBC AUTO: 89 FL
METAMYELOCYTES NFR BLD MANUAL: 1 %
MONOCYTES # BLD AUTO: ABNORMAL K/UL
MONOCYTES NFR BLD: 3 %
NEUTROPHILS NFR BLD: 81 %
NEUTS BAND NFR BLD MANUAL: 9 %
PLATELET # BLD AUTO: 211 K/UL
PMV BLD AUTO: 8.3 FL
RBC # BLD AUTO: 2.96 M/UL
T4 FREE SERPL-MCNC: 1.19 NG/DL
TSH SERPL DL<=0.005 MIU/L-ACNC: 7.35 UIU/ML
WBC # BLD AUTO: 7.4 K/UL

## 2017-09-21 PROCEDURE — 85027 COMPLETE CBC AUTOMATED: CPT

## 2017-09-21 PROCEDURE — 85007 BL SMEAR W/DIFF WBC COUNT: CPT

## 2017-09-21 PROCEDURE — 84439 ASSAY OF FREE THYROXINE: CPT

## 2017-09-21 PROCEDURE — 36415 COLL VENOUS BLD VENIPUNCTURE: CPT

## 2017-09-21 PROCEDURE — 3008F BODY MASS INDEX DOCD: CPT | Mod: S$GLB,,, | Performed by: INTERNAL MEDICINE

## 2017-09-21 PROCEDURE — 99999 PR PBB SHADOW E&M-EST. PATIENT-LVL III: CPT | Mod: PBBFAC,,, | Performed by: INTERNAL MEDICINE

## 2017-09-21 PROCEDURE — 1126F AMNT PAIN NOTED NONE PRSNT: CPT | Mod: S$GLB,,, | Performed by: INTERNAL MEDICINE

## 2017-09-21 PROCEDURE — 84443 ASSAY THYROID STIM HORMONE: CPT

## 2017-09-21 PROCEDURE — 99204 OFFICE O/P NEW MOD 45 MIN: CPT | Mod: S$GLB,,, | Performed by: INTERNAL MEDICINE

## 2017-09-21 PROCEDURE — 1159F MED LIST DOCD IN RCRD: CPT | Mod: S$GLB,,, | Performed by: INTERNAL MEDICINE

## 2017-09-21 NOTE — PROGRESS NOTES
Subjective:       Patient ID: Alyssa Hastings is a 86 y.o. female.    Chief Complaint: Constipation; Anorexia; Abdominal Pain (lower); and Weakness    This is an 87yo female with a PMHx of CKD, CAD, HTN, CVA here for evaluation of constipation.  It is of moderate severity and is described as 1 bowel movement every 7 days which is not particular hard.  It does respond well to Dulcolax as needed.  She has tried fiber, miralax and bisacodyl suppositories.  No change in stool caliber.  Her last colonoscopy was at age 79 and she reports this was normal.  No overt GI bleeding or unintentional weight loss.  She does note some early satiety over the past month.  No other exacerbating or relieving factors or other associated symptoms.  Her medications include levsin, metoprolol, amiodarone and levothyroxine.       She is also noted to have elevated LFTs, in a hepatocellular pattern which has been occurring since May.     HPI   Review of Systems   Constitutional: Negative for appetite change and fever.   HENT: Negative for postnasal drip and trouble swallowing.    Eyes: Negative for pain and redness.   Respiratory: Negative for cough, choking, chest tightness and shortness of breath.    Cardiovascular: Negative for chest pain and leg swelling.   Gastrointestinal: Positive for constipation. Negative for abdominal distention, abdominal pain, diarrhea, nausea, rectal pain and vomiting.   Endocrine: Negative for cold intolerance and heat intolerance.   Genitourinary: Negative for difficulty urinating and hematuria.   Musculoskeletal: Positive for arthralgias and back pain.   Skin: Negative for color change and pallor.   Allergic/Immunologic: Negative for environmental allergies and food allergies.   Neurological: Negative for dizziness and light-headedness.   Hematological: Negative for adenopathy. Does not bruise/bleed easily.   Psychiatric/Behavioral: Negative for agitation and behavioral problems.       Objective:       Physical Exam   Constitutional: She is oriented to person, place, and time. She appears well-developed and well-nourished. No distress.   HENT:   Head: Normocephalic and atraumatic.   Eyes: Conjunctivae are normal. No scleral icterus.   Neck: Normal range of motion. Neck supple. No tracheal deviation present. No thyromegaly present.   Cardiovascular: Normal rate and regular rhythm.  Exam reveals no gallop and no friction rub.    No murmur heard.  Pulmonary/Chest: Effort normal and breath sounds normal. No respiratory distress. She has no wheezes.   Abdominal: Soft. Bowel sounds are normal. She exhibits no distension. There is no tenderness.   Musculoskeletal:        Right wrist: She exhibits normal range of motion and no tenderness.        Left wrist: She exhibits normal range of motion and no tenderness.   Lymphadenopathy:        Head (right side): No submental and no submandibular adenopathy present.        Head (left side): No submental and no submandibular adenopathy present.   Neurological: She is alert and oriented to person, place, and time.   Skin: Skin is warm and dry. No rash noted. She is not diaphoretic. No erythema.   Psychiatric: She has a normal mood and affect. Her behavior is normal.   Nursing note and vitals reviewed.      Labs; ast 164  Assessment:       1. Constipation, unspecified constipation type    2. Abnormal liver enzymes        Plan:   1. Reports good response to dulcolax when used, I would schedule this for her every 3 days, though she can take daily or every other day as well to maintain better regularity  2. Agree with TSH testing as well  3. Monitor LFTs  4. Check ruq u/s

## 2017-09-22 ENCOUNTER — TELEPHONE (OUTPATIENT)
Dept: FAMILY MEDICINE | Facility: CLINIC | Age: 82
End: 2017-09-22

## 2017-09-22 RX ORDER — NAPROXEN SODIUM 220 MG/1
81 TABLET, FILM COATED ORAL DAILY
Qty: 100 TABLET | Refills: 3 | Status: ON HOLD | OUTPATIENT
Start: 2017-09-22 | End: 2017-12-11

## 2017-09-22 NOTE — TELEPHONE ENCOUNTER
----- Message from Meera Vital sent at 9/22/2017 10:00 AM CDT -----  [facility] is calling    [phone]  Needs new script for asprinin   States is not in patient profile   Needs this for the assisted living center    Needs the low dose asprinin

## 2017-09-26 ENCOUNTER — CLINICAL SUPPORT (OUTPATIENT)
Dept: AUDIOLOGY | Facility: CLINIC | Age: 82
End: 2017-09-26
Payer: COMMERCIAL

## 2017-09-26 DIAGNOSIS — H90.3 SENSORINEURAL HEARING LOSS, BILATERAL: Primary | ICD-10-CM

## 2017-09-26 NOTE — PROGRESS NOTES
Mrs. Hastings was seen for a hearing aid evaluation.  I dispensed two Oticon OPN 1 hearing aids .  She has #2 earwires with 85 speakers.  The right ear has a Widex small double dome and the left ear has a #6 base double vent Oticon dome.  Several programs were placed in the hearing aids.  She practiced changing the programs and adjusting volume.  Care and maintenance instructions were given.  She is returning in two weeks for adjustments.

## 2017-09-27 ENCOUNTER — EXTERNAL VISIT (OUTPATIENT)
Dept: FAMILY MEDICINE | Facility: CLINIC | Age: 82
End: 2017-09-27
Payer: COMMERCIAL

## 2017-09-27 VITALS
SYSTOLIC BLOOD PRESSURE: 104 MMHG | WEIGHT: 179 LBS | DIASTOLIC BLOOD PRESSURE: 62 MMHG | BODY MASS INDEX: 34.96 KG/M2 | OXYGEN SATURATION: 96 % | HEART RATE: 75 BPM

## 2017-09-27 DIAGNOSIS — R74.8 ABNORMAL LIVER ENZYMES: ICD-10-CM

## 2017-09-27 DIAGNOSIS — M79.89 LEG SWELLING: Primary | ICD-10-CM

## 2017-09-27 PROCEDURE — 99348 HOME/RES VST EST LOW MDM 30: CPT | Mod: ,,, | Performed by: FAMILY MEDICINE

## 2017-09-27 RX ORDER — FUROSEMIDE 20 MG/1
20 TABLET ORAL DAILY
Qty: 7 TABLET | Refills: 0 | Status: SHIPPED | OUTPATIENT
Start: 2017-09-27 | End: 2017-10-11 | Stop reason: SDUPTHER

## 2017-09-27 NOTE — PROGRESS NOTES
Subjective:       Patient ID: Alyssa Hastings is a 86 y.o. female.    Chief Complaint: Follow-up    HPI 86 edwige old female seen at [facility] for leg swelling and lab results. Patient states for four days she has noticed leg swelling extending to her thighs. She states the swelling is improving but slightly. She states the pain is in both legs and feels heavy. She has SOB but denies chest pains.   Review of Systems   Constitutional: Negative for chills and fever.   Respiratory: Positive for shortness of breath. Negative for chest tightness.    Cardiovascular: Positive for leg swelling. Negative for chest pain.   Gastrointestinal: Negative for abdominal pain, nausea and vomiting.   Musculoskeletal: Positive for myalgias.       Objective:      Vitals:    09/27/17 1314   BP: 104/62   Pulse: 75     Physical Exam   Constitutional: She appears well-developed and well-nourished. No distress.   Cardiovascular: Normal rate and regular rhythm.    Pulmonary/Chest: Effort normal. No respiratory distress. She has no wheezes. She has no rales.   Abdominal: Soft. There is no tenderness. There is no guarding.   Musculoskeletal: She exhibits edema.   2+pitting edema to bilateral knees. No calf tenderness bilaterally.      Neurological: She is alert.   Skin: She is not diaphoretic.   Vitals reviewed.      Assessment:       1. Leg swelling    2. Abnormal liver enzymes        Plan:         1. Leg swelling with unknown etiology: likely dependent edema. Will start lasix. Patient advised on elevating legs daily, compression stockings and salt restriction. I willl check CMP.   Daily weights  If no improvement, will check 2d echo, US lower ext.   Will f/u in 2 weeks or sooner prn.     Leg swelling    Abnormal liver enzymes  -     Comprehensive metabolic panel; Future; Expected date: 09/27/2017    Other orders  -     furosemide (LASIX) 20 MG tablet; Take 1 tablet (20 mg total) by mouth once daily.  Dispense: 7 tablet; Refill:  0      Return if symptoms worsen or fail to improve.

## 2017-09-28 ENCOUNTER — DOCUMENTATION ONLY (OUTPATIENT)
Dept: FAMILY MEDICINE | Facility: CLINIC | Age: 82
End: 2017-09-28

## 2017-09-29 ENCOUNTER — HOSPITAL ENCOUNTER (OUTPATIENT)
Dept: RADIOLOGY | Facility: HOSPITAL | Age: 82
Discharge: HOME | End: 2017-09-29
Attending: INTERNAL MEDICINE
Payer: COMMERCIAL

## 2017-09-29 DIAGNOSIS — R74.8 ABNORMAL LIVER ENZYMES: ICD-10-CM

## 2017-09-29 PROCEDURE — 76700 US EXAM ABDOM COMPLETE: CPT | Mod: 26,,, | Performed by: RADIOLOGY

## 2017-09-29 PROCEDURE — 76700 US EXAM ABDOM COMPLETE: CPT | Mod: TC

## 2017-09-29 RX ORDER — AMIODARONE HYDROCHLORIDE 200 MG/1
TABLET ORAL
Qty: 60 TABLET | Refills: 5 | Status: SHIPPED | OUTPATIENT
Start: 2017-09-29

## 2017-10-04 ENCOUNTER — TELEPHONE (OUTPATIENT)
Dept: FAMILY MEDICINE | Facility: CLINIC | Age: 82
End: 2017-10-04

## 2017-10-04 ENCOUNTER — TELEPHONE (OUTPATIENT)
Dept: GASTROENTEROLOGY | Facility: HOSPITAL | Age: 82
End: 2017-10-04

## 2017-10-04 DIAGNOSIS — R93.5 ABNORMAL ULTRASOUND OF ABDOMEN: Primary | ICD-10-CM

## 2017-10-05 ENCOUNTER — TELEPHONE (OUTPATIENT)
Dept: GASTROENTEROLOGY | Facility: CLINIC | Age: 82
End: 2017-10-05

## 2017-10-05 NOTE — TELEPHONE ENCOUNTER
Results discussed with patient's son.  Son will have assisted living facility call to coordinate [facility]SCAN appointment.

## 2017-10-06 RX ORDER — SUCRALFATE 1 G/1
1 TABLET ORAL 4 TIMES DAILY
Qty: 60 TABLET | Refills: 3 | OUTPATIENT
Start: 2017-10-06

## 2017-10-10 ENCOUNTER — CLINICAL SUPPORT (OUTPATIENT)
Dept: AUDIOLOGY | Facility: CLINIC | Age: 82
End: 2017-10-10
Payer: MEDICARE

## 2017-10-10 DIAGNOSIS — H90.3 SENSORINEURAL HEARING LOSS, BILATERAL: Primary | ICD-10-CM

## 2017-10-10 PROCEDURE — 99499 UNLISTED E&M SERVICE: CPT | Mod: S$GLB,,, | Performed by: OTOLARYNGOLOGY

## 2017-10-10 NOTE — PROGRESS NOTES
Mrs. Hastings was seen for a hearing aid follow up appointment.  Her biggest problem is inserting the aids into her ears and cleaning wax from the aids.  We practiced the entire time she was in the office and is doing better.  She is to return in November for follow up.

## 2017-10-11 ENCOUNTER — LAB VISIT (OUTPATIENT)
Dept: LAB | Facility: HOSPITAL | Age: 82
End: 2017-10-11
Payer: COMMERCIAL

## 2017-10-11 ENCOUNTER — OFFICE VISIT (OUTPATIENT)
Dept: NEPHROLOGY | Facility: CLINIC | Age: 82
End: 2017-10-11
Payer: COMMERCIAL

## 2017-10-11 VITALS
HEIGHT: 60 IN | OXYGEN SATURATION: 98 % | HEART RATE: 71 BPM | SYSTOLIC BLOOD PRESSURE: 100 MMHG | DIASTOLIC BLOOD PRESSURE: 60 MMHG

## 2017-10-11 DIAGNOSIS — R30.0 DYSURIA: ICD-10-CM

## 2017-10-11 DIAGNOSIS — N18.31 CKD STAGE G3A/A1, GFR 45-59 AND ALBUMIN CREATININE RATIO <30 MG/G: Primary | ICD-10-CM

## 2017-10-11 DIAGNOSIS — E87.1 HYPONATREMIA: ICD-10-CM

## 2017-10-11 DIAGNOSIS — M79.89 LEG SWELLING: ICD-10-CM

## 2017-10-11 DIAGNOSIS — N18.31 CKD STAGE G3A/A1, GFR 45-59 AND ALBUMIN CREATININE RATIO <30 MG/G: ICD-10-CM

## 2017-10-11 LAB
BACTERIA #/AREA URNS AUTO: ABNORMAL /HPF
BILIRUB UR QL STRIP: NEGATIVE
CLARITY UR REFRACT.AUTO: CLEAR
COLOR UR AUTO: YELLOW
CREAT UR-MCNC: 54 MG/DL
CREAT UR-MCNC: 54 MG/DL
GLUCOSE UR QL STRIP: NEGATIVE
HGB UR QL STRIP: NEGATIVE
KETONES UR QL STRIP: NEGATIVE
LEUKOCYTE ESTERASE UR QL STRIP: ABNORMAL
MICROALBUMIN UR DL<=1MG/L-MCNC: 40 UG/ML
MICROALBUMIN/CREATININE RATIO: 74.1 UG/MG
MICROSCOPIC COMMENT: ABNORMAL
NITRITE UR QL STRIP: NEGATIVE
OSMOLALITY UR: 181 MOSM/KG
PH UR STRIP: 5 [PH] (ref 5–8)
PROT UR QL STRIP: NEGATIVE
PROT UR-MCNC: 14 MG/DL
PROT/CREAT RATIO, UR: 0.26
RBC #/AREA URNS AUTO: 1 /HPF (ref 0–4)
SODIUM UR-SCNC: <20 MMOL/L
SP GR UR STRIP: 1 (ref 1–1.03)
SQUAMOUS #/AREA URNS AUTO: 0 /HPF
URN SPEC COLLECT METH UR: ABNORMAL
UROBILINOGEN UR STRIP-ACNC: NEGATIVE EU/DL
WBC #/AREA URNS AUTO: 15 /HPF (ref 0–5)

## 2017-10-11 PROCEDURE — 82570 ASSAY OF URINE CREATININE: CPT

## 2017-10-11 PROCEDURE — 87086 URINE CULTURE/COLONY COUNT: CPT

## 2017-10-11 PROCEDURE — 84300 ASSAY OF URINE SODIUM: CPT

## 2017-10-11 PROCEDURE — 84156 ASSAY OF PROTEIN URINE: CPT

## 2017-10-11 PROCEDURE — 87088 URINE BACTERIA CULTURE: CPT

## 2017-10-11 PROCEDURE — 99999 PR PBB SHADOW E&M-EST. PATIENT-LVL III: CPT | Mod: PBBFAC,,, | Performed by: INTERNAL MEDICINE

## 2017-10-11 PROCEDURE — 83935 ASSAY OF URINE OSMOLALITY: CPT

## 2017-10-11 PROCEDURE — 87186 SC STD MICRODIL/AGAR DIL: CPT

## 2017-10-11 PROCEDURE — 87077 CULTURE AEROBIC IDENTIFY: CPT

## 2017-10-11 PROCEDURE — 81001 URINALYSIS AUTO W/SCOPE: CPT

## 2017-10-11 PROCEDURE — 99204 OFFICE O/P NEW MOD 45 MIN: CPT | Mod: S$GLB,,, | Performed by: INTERNAL MEDICINE

## 2017-10-11 RX ORDER — FUROSEMIDE 20 MG/1
20 TABLET ORAL 2 TIMES DAILY
Qty: 60 TABLET | Refills: 11 | OUTPATIENT
Start: 2017-10-11 | End: 2017-10-11 | Stop reason: DRUGHIGH

## 2017-10-11 RX ORDER — FUROSEMIDE 20 MG/1
20 TABLET ORAL 2 TIMES DAILY
Qty: 60 TABLET | Refills: 11 | Status: SHIPPED | OUTPATIENT
Start: 2017-10-11 | End: 2017-11-08 | Stop reason: SDUPTHER

## 2017-10-11 NOTE — PROGRESS NOTES
"Subjective:       Patient ID: Alyssa Hastings is a 86 y.o. White female who presents for new evaluation of Chronic Kidney Disease    HPI   Ms. Hastings is an 85 yo female with T2DM, HTN, CAD, hypothyroidism, and overactive bladder (incontinence) who was referred to nephrology clinic by Urology.   Diagnosed with HTN >10 years ago. No hospitalizations for hypertensive emergency. BP is usually on the lower side; reports she has been "dragging".   Diagnosed with T2DM > 10 years ago as well. She has never taken medications for her diabetes. Eye exam earlier this year negative for DR.   She reports new onset BLE edema that started 3 weeks ago. Tries to elevate without relief. She was started on a 7-day trial of lasix (20mg daily) but reports this did not improve her symptoms.  Hyponatremia: first diagnosed s/p fall. Improved with IVF. Trying to restrict her Na intake due to LE edema.   She does not know why she is on amiodarone; she denies h/o arrhythmia.     Review of Systems    Const: Lives at [facility]. Never hungry. 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 Also has macular degeneration and glaucoma.   HENT: +allergy problems "all the time"  CV: no chest pain. See HPI  Resp: occasional SOB; no cough. +THOMPSON  GI: no diarrhea. +constipation.   : +dysuria. Thinks she has a UTI. +incontinence. No hematuria.   Skin: rosacea. No new skin rashes or lesions  Heme: +easy bleeding/bruising  Lymph: no swollen glands or LAD  Neuro: no tremors or HAs    Objective:     Current Outpatient Prescriptions:     acetaminophen (TYLENOL) 650 MG TbSR, Take 1 tablet (650 mg total) by mouth every 12 (twelve) hours as needed (start with daily prn)., Disp: 60 tablet, Rfl: 1    amiodarone (PACERONE) 200 MG Tab, TAKE ONE TABLET BY MOUTH TWICE DAILY, Disp: 60 tablet, Rfl: 5    aspirin 81 MG Chew, Take 1 tablet (81 mg total) by mouth once daily., Disp: 100 tablet, Rfl: 3    bimatoprost (LUMIGAN) 0.03 % ophthalmic drops, Place 1 " drop into both eyes nightly., Disp: 2.5 mL, Rfl: 6    brimonidine-timolol (COMBIGAN) 0.2-0.5 % Drop, Place 1 drop into the left eye 2 (two) times daily., Disp: 10 mL, Rfl: 6    docusate sodium (COLACE) 100 MG capsule, Take 1 capsule (100 mg total) by mouth 2 (two) times daily as needed for Constipation., Disp: 60 capsule, Rfl: 0    dorzolamide (TRUSOPT) 2 % ophthalmic solution, Place 1 drop into the left eye 2 (two) times daily., Disp: 10 mL, Rfl: 6    esomeprazole (NEXIUM) 40 MG capsule, Take 1 capsule (40 mg total) by mouth before breakfast., Disp: 30 capsule, Rfl: 5    furosemide (LASIX) 20 MG tablet, Take 1 tablet (20 mg total) by mouth once daily., Disp: 7 tablet, Rfl: 0    hydrALAZINE (APRESOLINE) 50 MG tablet, Take 1 tablet (50 mg total) by mouth 3 (three) times daily., Disp: 90 tablet, Rfl: 11    hyoscyamine (ANASPAZ,LEVSIN) 0.125 mg Tab, Take 125 mcg by mouth every 4 (four) hours as needed., Disp: , Rfl:     ipratropium (ATROVENT) 0.02 % nebulizer solution, Take 500 mcg by nebulization 4 (four) times daily. Rescue, Disp: , Rfl:     ipratropium (ATROVENT) 0.06 % nasal spray, 1 spray by Nasal route 2 (two) times daily as needed for Rhinitis., Disp: 15 mL, Rfl: 1    levocetirizine (XYZAL) 5 MG tablet, TAKE ONE TABLET BY MOUTH EVERY MORNING, Disp: 30 tablet, Rfl: 3    levothyroxine (SYNTHROID) 50 MCG tablet, Take 1 tablet (50 mcg total) by mouth once daily., Disp: 30 tablet, Rfl: 5    lisinopril (PRINIVIL,ZESTRIL) 40 MG tablet, Take 1 tablet (40 mg total) by mouth once daily. (Patient taking differently: Take 20 mg by mouth once daily. ), Disp: 30 tablet, Rfl: 0    METAMUCIL 0.52 gram capsule, TAKE 2 TO 6 CAPSULES BY MOUTH DAILY OR TAKE 5 CAPSULES UP TO FOUR TIMES DAILY, Disp: 100 capsule, Rfl: 1    metoprolol succinate (TOPROL-XL) 25 MG 24 hr tablet, Take 25 mg by mouth once daily., Disp: , Rfl:     nitroGLYCERIN (NITROSTAT) 0.4 MG SL tablet, Place 0.4 mg under the tongue every 5 (five) minutes  as needed for Chest pain., Disp: , Rfl:     olopatadine (PATADAY) 0.2 % Drop, Place 1 drop into both eyes once daily., Disp: 2.5 mL, Rfl: 6    PSYLLIUM HUSK/CALCIUM CARB (METAMUCIL PLUS CALCIUM ORAL), Take by mouth., Disp: , Rfl:     solifenacin (VESICARE) 10 MG tablet, Take 1 tablet (10 mg total) by mouth once daily., Disp: 30 tablet, Rfl: 11    sucralfate (CARAFATE) 1 gram tablet, Take 1 g by mouth 4 (four) times daily., Disp: , Rfl:     tramadol (ULTRAM) 50 mg tablet, Take 50 mg by mouth every 6 (six) hours as needed for Pain., Disp: , Rfl:     trolamine salicylate (ASPERCREME) 10 % cream, Apply topically as needed., Disp: , Rfl:     Physical Exam    Blood pressure 100/60, pulse 71, height 5' (1.524 m), SpO2 98 %.  Gen: WDWN female in NAD. Appears well.  Eyes: EOMI, clear conjunctivae  HENT:  NC/AT. MMM  Neck: supple, no thyromegaly  Lymph: no cervical or supraclavicular LAD  CV: RRR. BLE with 2+ pitting edema  Resp: normal effort. Decreased BS. No crackles appreicated  Skin: warm, normal turgor. Rosacea to bilateral cheeks  Psych: normal behavior and affect.       BMP  Lab Results   Component Value Date     (L) 09/29/2017    K 3.9 09/29/2017    CL 98 09/29/2017    CO2 25 09/29/2017    BUN 14 09/29/2017    CREATININE 1.0 09/29/2017    CALCIUM 8.7 09/29/2017    ANIONGAP 8 09/29/2017    ESTGFRAFRICA 59 (A) 09/29/2017    EGFRNONAA 51 (A) 09/29/2017       Data:  Abdominal US 9/29/17: Right kidney measures 9.6 cm and the left kidney measures 10.7 cm.  There is no hydronephrosis or renal masses.  Assessment:       1. CKD stage G3a/A1, GFR 45-59 and albumin creatinine ratio <30 mg/g    2. Leg swelling    3. Hyponatremia    4. Dysuria        Plan:   1. CKD: baseline sCr ~1.0 with eGFR ~50. No proteinuria  - likely 2/2 age-related nephron lost in setting of HTN and DM  - currently at baseline     Lab Results   Component Value Date    CREATININE 1.0 09/29/2017     Protein Creatinine Ratios: Negative. ACR 5-15.  Continue ACEi. Will order UPC.   No results found for: UTPCR    2. BLE edema: unexplained by negative microalbumin:creatinine ratio. Serum albumin level normal. Unlikely glomerular in nature. Will order urine protein:creatinine ratio to r/o non-albumin proteinuria. Will also order TTE to evaluate for CHF.   Currently not on lasix. Will restart. Lasix 20mg po twice daily.     HTN: /60 today. Running low at home. Increasing lasix as above; will compensate by stopping hydralazine. Paper scripts provided.  DM:  Last HbA1C at goal for age. None checked recently.   Lab Results   Component Value Date    HGBA1C 6.9 (H) 12/26/2007     3. Hyponatremia  - chronic since 5/2017. Baseline Na 131.  - will order SOsm, UOsm, and Rick  - TTE as above to evaluate for CHF    4. Dysuria  - intermittent  - will order UA and urine culture      RTC 3 months. If renal function stable; will space out visits.      Marcy Love, PGY-5  Nephrology Fellow  [facility]  Pager: [phone]

## 2017-10-12 ENCOUNTER — HOSPITAL ENCOUNTER (OUTPATIENT)
Dept: RADIOLOGY | Facility: HOSPITAL | Age: 82
Discharge: HOME | End: 2017-10-12
Attending: INTERNAL MEDICINE
Payer: COMMERCIAL

## 2017-10-12 DIAGNOSIS — R93.5 ABNORMAL ULTRASOUND OF ABDOMEN: ICD-10-CM

## 2017-10-12 PROCEDURE — 74177 CT ABD & PELVIS W/CONTRAST: CPT | Mod: 26,,, | Performed by: RADIOLOGY

## 2017-10-12 PROCEDURE — 74177 CT ABD & PELVIS W/CONTRAST: CPT | Mod: TC

## 2017-10-12 PROCEDURE — 25500020 PHARM REV CODE 255: Performed by: INTERNAL MEDICINE

## 2017-10-12 RX ADMIN — IOHEXOL 30 ML: 350 INJECTION, SOLUTION INTRAVENOUS at 08:10

## 2017-10-12 RX ADMIN — IOHEXOL 75 ML: 350 INJECTION, SOLUTION INTRAVENOUS at 09:10

## 2017-10-13 ENCOUNTER — TELEPHONE (OUTPATIENT)
Dept: NEPHROLOGY | Facility: HOSPITAL | Age: 82
End: 2017-10-13

## 2017-10-13 DIAGNOSIS — R30.0 DYSURIA: Primary | ICD-10-CM

## 2017-10-13 RX ORDER — CIPROFLOXACIN 250 MG/1
250 TABLET, FILM COATED ORAL EVERY 12 HOURS
Qty: 10 TABLET | Refills: 0 | Status: SHIPPED | OUTPATIENT
Start: 2017-10-13 | End: 2017-10-18

## 2017-10-13 NOTE — TELEPHONE ENCOUNTER
Called [facility] to call in prescription for UTI.   Urine culture with GNR; not yet speciated.  Allergies reviewed.   Will start ciprofloxacin 25mg po BID x 5 days.   Will fax printed Rx to [facility] at [phone].    Will f/u speciation and sensitivities and adjust abx as needed.        Marcy Love, PGY-5  Nephrology Fellow  [facility]  Pager: [phone]

## 2017-10-14 LAB — BACTERIA UR CULT: NORMAL

## 2017-10-16 RX ORDER — IPRATROPIUM BROMIDE 42 UG/1
SPRAY, METERED NASAL
Qty: 15 ML | Refills: 1 | Status: SHIPPED | OUTPATIENT
Start: 2017-10-16

## 2017-10-16 NOTE — PROGRESS NOTES
[facility]    The note from the fellow/resident was reviewed. I have personally interviewed and examined the patient. There were no additional findings with regards to the history or physical exam.    I agree with the assessment and plan of  Dr. Marcy Love

## 2017-10-17 ENCOUNTER — TELEPHONE (OUTPATIENT)
Dept: GASTROENTEROLOGY | Facility: CLINIC | Age: 82
End: 2017-10-17

## 2017-10-17 NOTE — TELEPHONE ENCOUNTER
----- Message from Logan Gant sent at 10/17/2017  1:51 PM CDT -----  Contact: son/Luis  [phone]  Pt son requesting to speak with you concerning the pt test results.  Please call and advise

## 2017-10-18 ENCOUNTER — TELEPHONE (OUTPATIENT)
Dept: GASTROENTEROLOGY | Facility: CLINIC | Age: 82
End: 2017-10-18

## 2017-10-18 NOTE — TELEPHONE ENCOUNTER
SonLuis is unable to arrange for medical transportation on short notice.  Would like a call today between 10:30-11:30 or 1-2 today if possible.

## 2017-10-19 NOTE — TELEPHONE ENCOUNTER
----- Message from Moon Drummond sent at 10/18/2017  9:55 AM CDT -----  Contact: Luis (son)/ [phone]  Patient's son called in to speak with nurse Lozano regarding his mother.     Please call and advise.

## 2017-10-25 ENCOUNTER — TELEPHONE (OUTPATIENT)
Dept: GASTROENTEROLOGY | Facility: CLINIC | Age: 82
End: 2017-10-25

## 2017-10-30 ENCOUNTER — LAB VISIT (OUTPATIENT)
Dept: LAB | Facility: HOSPITAL | Age: 82
End: 2017-10-30
Attending: INTERNAL MEDICINE
Payer: COMMERCIAL

## 2017-10-30 ENCOUNTER — OFFICE VISIT (OUTPATIENT)
Dept: GASTROENTEROLOGY | Facility: CLINIC | Age: 82
End: 2017-10-30
Payer: COMMERCIAL

## 2017-10-30 DIAGNOSIS — R93.3 ABNORMAL FINDING ON GI TRACT IMAGING: Primary | ICD-10-CM

## 2017-10-30 DIAGNOSIS — R93.3 ABNORMAL FINDING ON GI TRACT IMAGING: ICD-10-CM

## 2017-10-30 LAB
CANCER AG125 SERPL-ACNC: 259 U/ML
CANCER AG19-9 SERPL-ACNC: 123 U/ML
CEA SERPL-MCNC: 6.8 NG/ML

## 2017-10-30 PROCEDURE — 99213 OFFICE O/P EST LOW 20 MIN: CPT | Mod: S$GLB,,, | Performed by: INTERNAL MEDICINE

## 2017-10-30 PROCEDURE — 86304 IMMUNOASSAY TUMOR CA 125: CPT

## 2017-10-30 PROCEDURE — 82378 CARCINOEMBRYONIC ANTIGEN: CPT

## 2017-10-30 PROCEDURE — 36415 COLL VENOUS BLD VENIPUNCTURE: CPT

## 2017-10-30 PROCEDURE — 86301 IMMUNOASSAY TUMOR CA 19-9: CPT

## 2017-10-30 PROCEDURE — 82105 ALPHA-FETOPROTEIN SERUM: CPT

## 2017-10-31 LAB — AFP SERPL-MCNC: 3.2 NG/ML

## 2017-11-03 ENCOUNTER — TELEPHONE (OUTPATIENT)
Dept: GASTROENTEROLOGY | Facility: CLINIC | Age: 82
End: 2017-11-03

## 2017-11-08 ENCOUNTER — EXTERNAL VISIT (OUTPATIENT)
Dept: FAMILY MEDICINE | Facility: CLINIC | Age: 82
End: 2017-11-08
Payer: MEDICARE

## 2017-11-08 VITALS
DIASTOLIC BLOOD PRESSURE: 50 MMHG | HEART RATE: 60 BPM | SYSTOLIC BLOOD PRESSURE: 120 MMHG | RESPIRATION RATE: 16 BRPM | OXYGEN SATURATION: 97 %

## 2017-11-08 DIAGNOSIS — I10 ESSENTIAL HYPERTENSION: ICD-10-CM

## 2017-11-08 DIAGNOSIS — M79.89 LEG SWELLING: Primary | ICD-10-CM

## 2017-11-08 DIAGNOSIS — R16.0 LIVER MASS: ICD-10-CM

## 2017-11-08 PROCEDURE — 99348 HOME/RES VST EST LOW MDM 30: CPT | Mod: ,,, | Performed by: FAMILY MEDICINE

## 2017-11-08 RX ORDER — FUROSEMIDE 20 MG/1
20 TABLET ORAL DAILY
Qty: 30 TABLET | Refills: 6 | Status: SHIPPED | OUTPATIENT
Start: 2017-11-08 | End: 2017-11-22 | Stop reason: SDUPTHER

## 2017-11-08 NOTE — PROGRESS NOTES
Subjective:       Patient ID: Alyssa Hastings is a 86 y.o. female.    Chief Complaint: Follow-up    HPI 86 year ol female seen at [facility]. She has multiple liver masses and due for a biopsy. She has concerns about excess bleeding from possible complications.  patient has chronic leg swelling and takes lasix 20 mg BID. Patient states her diastolic BP is too low and she feels tired and dizzy. She is currently taking lasix 20 mg daily with occasional second dose if her leg swelling becomes excessive.   Patient's mattress is elevted and shes her left elbow and leg to help get on there. She has noticed a wart like lesion on her left elbow. She denies drainage and pain. She and her family have plans to lower the bed.   Review of Systems   Constitutional: Negative for chills and fever.   Respiratory: Negative for chest tightness and shortness of breath.    Cardiovascular: Positive for leg swelling. Negative for chest pain.   Gastrointestinal: Negative for abdominal pain, diarrhea, nausea and vomiting.   Skin: Positive for wound.   Psychiatric/Behavioral: Negative for agitation and behavioral problems.       Objective:      Vitals:    11/08/17 1330   BP: (!) 120/50   Pulse: 60   Resp: 16     Physical Exam   Constitutional: She is oriented to person, place, and time. She appears well-developed and well-nourished. No distress.   Cardiovascular: Normal rate and regular rhythm.    Pulmonary/Chest: Effort normal. She has no wheezes.   Abdominal: Soft. There is no tenderness. There is no guarding.   Neurological: She is alert and oriented to person, place, and time.   Skin: She is not diaphoretic.   Psychiatric: She has a normal mood and affect. Her behavior is normal. Judgment and thought content normal.   Vitals reviewed.      Assessment:       1. Leg swelling    2. Liver mass    3. Essential hypertension        Plan:         1. Liver mass with concern for malignancy: patient is requesting a  hospital admission for this biopsy due to bleeding risk. I will speak to  to f/u on this and if a hospital admission is necessary.  2. Leg swelling: patient advised on salt restriction, elevation of legs and I will decrease lasix to 20 mg daily as patient has noticed a drop in her diastolic blood pressure with associated weakness.   Leg swelling    Liver mass    Essential hypertension    Other orders  -     furosemide (LASIX) 20 MG tablet; Take 1 tablet (20 mg total) by mouth once daily.  Dispense: 30 tablet; Refill: 6      Return if symptoms worsen or fail to improve.

## 2017-11-09 ENCOUNTER — DOCUMENTATION ONLY (OUTPATIENT)
Dept: FAMILY MEDICINE | Facility: CLINIC | Age: 82
End: 2017-11-09

## 2017-11-09 RX ORDER — ESOMEPRAZOLE MAGNESIUM 40 MG/1
40 CAPSULE, DELAYED RELEASE ORAL
Qty: 30 CAPSULE | Refills: 5 | Status: SHIPPED | OUTPATIENT
Start: 2017-11-09

## 2017-11-09 NOTE — PROGRESS NOTES
I have exchanged messages with  who states although there is a bleeding risk with the liver biopsy, a hospital admission is not usually necessary. I agree with this and have informed Radha Goldsmith the nurse practitioner at [facility] living [place].

## 2017-11-14 ENCOUNTER — CLINICAL SUPPORT (OUTPATIENT)
Dept: AUDIOLOGY | Facility: CLINIC | Age: 82
End: 2017-11-14
Payer: MEDICARE

## 2017-11-14 DIAGNOSIS — H90.3 SENSORINEURAL HEARING LOSS, BILATERAL: Primary | ICD-10-CM

## 2017-11-14 PROCEDURE — 99499 UNLISTED E&M SERVICE: CPT | Mod: S$GLB,,, | Performed by: OTOLARYNGOLOGY

## 2017-11-14 NOTE — PROGRESS NOTES
Mrs. Hastings was seen for a hearing aid follow up appointment.  We discussed and demonstrated the cleaning tools and wax filters again.  She practiced inserting and removing the aids again.  Her children were going to accompany her but were sick.  I told her she could come back again when the adults were healthy.

## 2017-11-16 RX ORDER — LISINOPRIL 40 MG/1
TABLET ORAL
Qty: 30 TABLET | OUTPATIENT
Start: 2017-11-16

## 2017-11-16 RX ORDER — LISINOPRIL 20 MG/1
TABLET ORAL
Qty: 30 TABLET | Refills: 5 | Status: SHIPPED | OUTPATIENT
Start: 2017-11-16

## 2017-11-20 RX ORDER — LEVOCETIRIZINE DIHYDROCHLORIDE 5 MG/1
TABLET, FILM COATED ORAL
Qty: 30 TABLET | Refills: 3 | Status: SHIPPED | OUTPATIENT
Start: 2017-11-20

## 2017-11-20 RX ORDER — METOPROLOL SUCCINATE 25 MG/1
25 TABLET, EXTENDED RELEASE ORAL DAILY
Qty: 30 TABLET | Refills: 3 | Status: SHIPPED | OUTPATIENT
Start: 2017-11-20

## 2017-11-21 NOTE — TELEPHONE ENCOUNTER
----- Message from Radha Voss sent at 11/21/2017  2:02 PM CST -----  Contact: [facility] ([facility])  Pt prescription refill on metoprolol tratrate 25 mg twice a day needs clarification. [facility] can be reached at [phone].

## 2017-11-22 ENCOUNTER — TELEPHONE (OUTPATIENT)
Dept: FAMILY MEDICINE | Facility: CLINIC | Age: 82
End: 2017-11-22

## 2017-11-22 RX ORDER — FUROSEMIDE 20 MG/1
20 TABLET ORAL DAILY
Qty: 40 TABLET | Refills: 5 | Status: SHIPPED | OUTPATIENT
Start: 2017-11-22 | End: 2018-11-22

## 2017-11-22 NOTE — TELEPHONE ENCOUNTER
----- Message from Ashley Johnson MD sent at 11/22/2017  3:24 PM CST -----  Once daily, for metoprolol. Thank you!

## 2017-11-22 NOTE — PROGRESS NOTES
I have seen patient today about leg swelling. Patient takes lasix 20 mg daily as needed for leg swelling. She is unable to take twice daily due to feelings of weakness and low BP.   Patient weighs 170 today.  She is not having excessive SOB.   I have advised on compression stockings, elevating her legs throughout the day and she may take an extra dose of lasix weekly if needed. She was advised to monitor her weights daily.

## 2017-11-22 NOTE — TELEPHONE ENCOUNTER
----- Message from Radha Voss sent at 11/22/2017  2:28 PM CST -----  Contact: [facility]  [facility] was calling back in regards to pt prescription refill on metoprolotratrate 25 mg twice a day. It needs clarification. She can be reached at [phone].

## 2017-12-01 ENCOUNTER — TELEPHONE (OUTPATIENT)
Dept: GASTROENTEROLOGY | Facility: CLINIC | Age: 82
End: 2017-12-01

## 2017-12-01 NOTE — TELEPHONE ENCOUNTER
----- Message from Rosalie Hines sent at 12/1/2017  9:36 AM CST -----  Contact: mr mann, pt's son [phone]  Mr mann would like to schedule a biopsy for patient

## 2017-12-01 NOTE — TELEPHONE ENCOUNTER
Patient's son would like to know if liver biopsy is still warranted since the marker looking at primary liver cancer was normal.  Patient wishes to proceed if this is the recommendation.    If you do not think it is necessary, which would be the next step?

## 2017-12-03 ENCOUNTER — HOSPITAL ENCOUNTER (INPATIENT)
Facility: HOSPITAL | Age: 82
LOS: 9 days | Discharge: NURSING, REHAB OR LONG-TERM CARE | DRG: 956 | End: 2017-12-12
Attending: EMERGENCY MEDICINE | Admitting: FAMILY MEDICINE
Payer: COMMERCIAL

## 2017-12-03 ENCOUNTER — ANESTHESIA EVENT (OUTPATIENT)
Dept: SURGERY | Facility: HOSPITAL | Age: 82
DRG: 956 | End: 2017-12-03
Payer: COMMERCIAL

## 2017-12-03 DIAGNOSIS — Z01.818 PRE-OP EXAM: ICD-10-CM

## 2017-12-03 DIAGNOSIS — M25.551 RIGHT HIP PAIN: ICD-10-CM

## 2017-12-03 DIAGNOSIS — N18.31 CKD STAGE G3A/A1, GFR 45-59 AND ALBUMIN CREATININE RATIO <30 MG/G: ICD-10-CM

## 2017-12-03 DIAGNOSIS — E03.9 HYPOTHYROIDISM, UNSPECIFIED TYPE: ICD-10-CM

## 2017-12-03 DIAGNOSIS — E03.4 HYPOTHYROIDISM DUE TO ACQUIRED ATROPHY OF THYROID: ICD-10-CM

## 2017-12-03 DIAGNOSIS — E87.1 HYPONATREMIA: ICD-10-CM

## 2017-12-03 DIAGNOSIS — Z95.1 HX OF CABG: ICD-10-CM

## 2017-12-03 DIAGNOSIS — E87.6 HYPOKALEMIA: ICD-10-CM
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
## 2017-12-03 DIAGNOSIS — Z01.818 PRE-OP EVALUATION: ICD-10-CM

## 2017-12-03 DIAGNOSIS — S72.001A CLOSED DISPLACED FRACTURE OF RIGHT FEMORAL NECK: Primary | ICD-10-CM

## 2017-12-03 DIAGNOSIS — Z95.1 HISTORY OF CORONARY ARTERY BYPASS GRAFT X 2: ICD-10-CM
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
## 2017-12-03 DIAGNOSIS — I10 ESSENTIAL HYPERTENSION: ICD-10-CM

## 2017-12-03 DIAGNOSIS — H54.40 BLINDNESS OF LEFT EYE: ICD-10-CM

## 2017-12-03 LAB
ABO + RH BLD: NORMAL
ALBUMIN SERPL BCP-MCNC: 2.8 G/DL
ALP SERPL-CCNC: 74 U/L
ALT SERPL W/O P-5'-P-CCNC: 35 U/L
ANION GAP SERPL CALC-SCNC: 11 MMOL/L
ANION GAP SERPL CALC-SCNC: 7 MMOL/L
APTT BLDCRRT: 25.9 SEC
AST SERPL-CCNC: 65 U/L
BACTERIA #/AREA URNS HPF: ABNORMAL /HPF
BASOPHILS # BLD AUTO: 0.02 K/UL
BASOPHILS NFR BLD: 0.3 %
BILIRUB SERPL-MCNC: 0.7 MG/DL
BILIRUB UR QL STRIP: NEGATIVE
BLD GP AB SCN CELLS X3 SERPL QL: NORMAL
BUN SERPL-MCNC: 15 MG/DL
BUN SERPL-MCNC: 16 MG/DL
CALCIUM SERPL-MCNC: 8.2 MG/DL
CALCIUM SERPL-MCNC: 8.2 MG/DL
CHLORIDE SERPL-SCNC: 99 MMOL/L
CHLORIDE SERPL-SCNC: 99 MMOL/L
CLARITY UR: CLEAR
CO2 SERPL-SCNC: 25 MMOL/L
CO2 SERPL-SCNC: 29 MMOL/L
COLOR UR: YELLOW
CREAT SERPL-MCNC: 0.9 MG/DL
CREAT SERPL-MCNC: 1 MG/DL
DIFFERENTIAL METHOD: ABNORMAL
EOSINOPHIL # BLD AUTO: 0.2 K/UL
EOSINOPHIL NFR BLD: 3.4 %
ERYTHROCYTE [DISTWIDTH] IN BLOOD BY AUTOMATED COUNT: 16 %
EST. GFR  (AFRICAN AMERICAN): 59 ML/MIN/1.73 M^2
EST. GFR  (AFRICAN AMERICAN): >60 ML/MIN/1.73 M^2
EST. GFR  (NON AFRICAN AMERICAN): 51 ML/MIN/1.73 M^2
EST. GFR  (NON AFRICAN AMERICAN): 58 ML/MIN/1.73 M^2
ESTIMATED AVG GLUCOSE: 108 MG/DL
GLUCOSE SERPL-MCNC: 110 MG/DL
GLUCOSE SERPL-MCNC: 98 MG/DL
GLUCOSE UR QL STRIP: NEGATIVE
HBA1C MFR BLD HPLC: 5.4 %
HCT VFR BLD AUTO: 27.4 %
HGB BLD-MCNC: 8.8 G/DL
HGB UR QL STRIP: NEGATIVE
HYALINE CASTS #/AREA URNS LPF: 1 /LPF
INR PPP: 1.1
KETONES UR QL STRIP: NEGATIVE
LEUKOCYTE ESTERASE UR QL STRIP: NEGATIVE
LYMPHOCYTES # BLD AUTO: 0.8 K/UL
LYMPHOCYTES NFR BLD: 11.8 %
MCH RBC QN AUTO: 29.4 PG
MCHC RBC AUTO-ENTMCNC: 32.1 G/DL
MCV RBC AUTO: 92 FL
MICROSCOPIC COMMENT: ABNORMAL
MONOCYTES # BLD AUTO: 0.6 K/UL
MONOCYTES NFR BLD: 8.9 %
NEUTROPHILS # BLD AUTO: 4.9 K/UL
NEUTROPHILS NFR BLD: 75.3 %
NITRITE UR QL STRIP: NEGATIVE
PH UR STRIP: 7 [PH] (ref 5–8)
PHOSPHATE SERPL-MCNC: 2.8 MG/DL
PLATELET # BLD AUTO: 156 K/UL
PMV BLD AUTO: 10.5 FL
POTASSIUM SERPL-SCNC: 3.3 MMOL/L
POTASSIUM SERPL-SCNC: 3.3 MMOL/L
PROT SERPL-MCNC: 6.7 G/DL
PROT UR QL STRIP: ABNORMAL
PROTHROMBIN TIME: 11.6 SEC
RBC # BLD AUTO: 2.99 M/UL
RBC #/AREA URNS HPF: 5 /HPF (ref 0–4)
SODIUM SERPL-SCNC: 135 MMOL/L
SODIUM SERPL-SCNC: 135 MMOL/L
SP GR UR STRIP: 1.01 (ref 1–1.03)
SQUAMOUS #/AREA URNS HPF: 4 /HPF
URN SPEC COLLECT METH UR: ABNORMAL
UROBILINOGEN UR STRIP-ACNC: 1 EU/DL
WBC # BLD AUTO: 6.55 K/UL
WBC #/AREA URNS HPF: 6 /HPF (ref 0–5)

## 2017-12-03 PROCEDURE — 81000 URINALYSIS NONAUTO W/SCOPE: CPT

## 2017-12-03 PROCEDURE — 93010 ELECTROCARDIOGRAM REPORT: CPT | Mod: ,,, | Performed by: INTERNAL MEDICINE

## 2017-12-03 PROCEDURE — 86850 RBC ANTIBODY SCREEN: CPT

## 2017-12-03 PROCEDURE — 94640 AIRWAY INHALATION TREATMENT: CPT

## 2017-12-03 PROCEDURE — 25000003 PHARM REV CODE 250: Performed by: FAMILY MEDICINE

## 2017-12-03 PROCEDURE — 99900035 HC TECH TIME PER 15 MIN (STAT)

## 2017-12-03 PROCEDURE — 11000001 HC ACUTE MED/SURG PRIVATE ROOM

## 2017-12-03 PROCEDURE — 63600175 PHARM REV CODE 636 W HCPCS

## 2017-12-03 PROCEDURE — 25000003 PHARM REV CODE 250: Performed by: STUDENT IN AN ORGANIZED HEALTH CARE EDUCATION/TRAINING PROGRAM

## 2017-12-03 PROCEDURE — 94761 N-INVAS EAR/PLS OXIMETRY MLT: CPT

## 2017-12-03 PROCEDURE — 63600175 PHARM REV CODE 636 W HCPCS: Performed by: STUDENT IN AN ORGANIZED HEALTH CARE EDUCATION/TRAINING PROGRAM

## 2017-12-03 PROCEDURE — 99285 EMERGENCY DEPT VISIT HI MDM: CPT

## 2017-12-03 PROCEDURE — 85730 THROMBOPLASTIN TIME PARTIAL: CPT

## 2017-12-03 PROCEDURE — 84100 ASSAY OF PHOSPHORUS: CPT

## 2017-12-03 PROCEDURE — 80053 COMPREHEN METABOLIC PANEL: CPT

## 2017-12-03 PROCEDURE — 25000003 PHARM REV CODE 250

## 2017-12-03 PROCEDURE — 85610 PROTHROMBIN TIME: CPT

## 2017-12-03 PROCEDURE — 86900 BLOOD TYPING SEROLOGIC ABO: CPT

## 2017-12-03 PROCEDURE — 36415 COLL VENOUS BLD VENIPUNCTURE: CPT

## 2017-12-03 PROCEDURE — 25000242 PHARM REV CODE 250 ALT 637 W/ HCPCS: Performed by: STUDENT IN AN ORGANIZED HEALTH CARE EDUCATION/TRAINING PROGRAM

## 2017-12-03 PROCEDURE — 85025 COMPLETE CBC W/AUTO DIFF WBC: CPT

## 2017-12-03 PROCEDURE — 93005 ELECTROCARDIOGRAM TRACING: CPT

## 2017-12-03 PROCEDURE — 80048 BASIC METABOLIC PNL TOTAL CA: CPT

## 2017-12-03 PROCEDURE — 83036 HEMOGLOBIN GLYCOSYLATED A1C: CPT

## 2017-12-03 RX ORDER — IPRATROPIUM BROMIDE 0.5 MG/2.5ML
500 SOLUTION RESPIRATORY (INHALATION) 4 TIMES DAILY
Status: DISCONTINUED | OUTPATIENT
Start: 2017-12-03 | End: 2017-12-05

## 2017-12-03 RX ORDER — OLOPATADINE HYDROCHLORIDE 1 MG/ML
1 SOLUTION/ DROPS OPHTHALMIC 2 TIMES DAILY
Status: DISCONTINUED | OUTPATIENT
Start: 2017-12-03 | End: 2017-12-12 | Stop reason: HOSPADM

## 2017-12-03 RX ORDER — SUCRALFATE 1 G/1
1 TABLET ORAL 4 TIMES DAILY
Status: DISCONTINUED | OUTPATIENT
Start: 2017-12-03 | End: 2017-12-12 | Stop reason: HOSPADM

## 2017-12-03 RX ORDER — GLUCAGON 1 MG
1 KIT INJECTION
Status: DISCONTINUED | OUTPATIENT
Start: 2017-12-03 | End: 2017-12-12 | Stop reason: HOSPADM

## 2017-12-03 RX ORDER — AMIODARONE HYDROCHLORIDE 200 MG/1
200 TABLET ORAL 2 TIMES DAILY
Status: DISCONTINUED | OUTPATIENT
Start: 2017-12-03 | End: 2017-12-12 | Stop reason: HOSPADM

## 2017-12-03 RX ORDER — FUROSEMIDE 20 MG/1
20 TABLET ORAL DAILY
Status: DISCONTINUED | OUTPATIENT
Start: 2017-12-03 | End: 2017-12-11

## 2017-12-03 RX ORDER — TRAMADOL HYDROCHLORIDE 50 MG/1
50 TABLET ORAL EVERY 4 HOURS PRN
Status: DISCONTINUED | OUTPATIENT
Start: 2017-12-03 | End: 2017-12-04

## 2017-12-03 RX ORDER — METOPROLOL SUCCINATE 25 MG/1
25 TABLET, EXTENDED RELEASE ORAL DAILY
Status: DISCONTINUED | OUTPATIENT
Start: 2017-12-03 | End: 2017-12-11

## 2017-12-03 RX ORDER — POTASSIUM CHLORIDE 20 MEQ/15ML
40 SOLUTION ORAL ONCE
Status: COMPLETED | OUTPATIENT
Start: 2017-12-03 | End: 2017-12-03

## 2017-12-03 RX ORDER — IBUPROFEN 200 MG
24 TABLET ORAL
Status: DISCONTINUED | OUTPATIENT
Start: 2017-12-03 | End: 2017-12-12 | Stop reason: HOSPADM

## 2017-12-03 RX ORDER — INSULIN ASPART 100 [IU]/ML
0-5 INJECTION, SOLUTION INTRAVENOUS; SUBCUTANEOUS
Status: DISCONTINUED | OUTPATIENT
Start: 2017-12-03 | End: 2017-12-12 | Stop reason: HOSPADM

## 2017-12-03 RX ORDER — PANTOPRAZOLE SODIUM 40 MG/1
40 TABLET, DELAYED RELEASE ORAL DAILY
Status: DISCONTINUED | OUTPATIENT
Start: 2017-12-03 | End: 2017-12-12 | Stop reason: HOSPADM

## 2017-12-03 RX ORDER — TRAMADOL HYDROCHLORIDE 50 MG/1
50 TABLET ORAL EVERY 6 HOURS PRN
Status: DISCONTINUED | OUTPATIENT
Start: 2017-12-03 | End: 2017-12-03

## 2017-12-03 RX ORDER — IBUPROFEN 200 MG
16 TABLET ORAL
Status: DISCONTINUED | OUTPATIENT
Start: 2017-12-03 | End: 2017-12-12 | Stop reason: HOSPADM

## 2017-12-03 RX ORDER — LEVOTHYROXINE SODIUM 50 UG/1
50 TABLET ORAL DAILY
Status: DISCONTINUED | OUTPATIENT
Start: 2017-12-03 | End: 2017-12-03

## 2017-12-03 RX ORDER — SODIUM CHLORIDE 0.9 % (FLUSH) 0.9 %
5 SYRINGE (ML) INJECTION
Status: DISCONTINUED | OUTPATIENT
Start: 2017-12-03 | End: 2017-12-12 | Stop reason: HOSPADM

## 2017-12-03 RX ORDER — HYDROCODONE BITARTRATE AND ACETAMINOPHEN 7.5; 325 MG/15ML; MG/15ML
10 SOLUTION ORAL EVERY 6 HOURS PRN
Status: DISCONTINUED | OUTPATIENT
Start: 2017-12-03 | End: 2017-12-03

## 2017-12-03 RX ORDER — ENOXAPARIN SODIUM 100 MG/ML
40 INJECTION SUBCUTANEOUS EVERY 24 HOURS
Status: DISCONTINUED | OUTPATIENT
Start: 2017-12-03 | End: 2017-12-12 | Stop reason: HOSPADM

## 2017-12-03 RX ORDER — MORPHINE SULFATE ORAL SOLUTION 10 MG/5ML
2.5 SOLUTION ORAL EVERY 4 HOURS PRN
Status: DISCONTINUED | OUTPATIENT
Start: 2017-12-03 | End: 2017-12-03

## 2017-12-03 RX ORDER — LISINOPRIL 20 MG/1
20 TABLET ORAL DAILY
Status: DISCONTINUED | OUTPATIENT
Start: 2017-12-03 | End: 2017-12-04

## 2017-12-03 RX ORDER — LISINOPRIL 20 MG/1
40 TABLET ORAL DAILY
Status: DISCONTINUED | OUTPATIENT
Start: 2017-12-04 | End: 2017-12-12 | Stop reason: HOSPADM

## 2017-12-03 RX ORDER — HYDRALAZINE HYDROCHLORIDE 20 MG/ML
10 INJECTION INTRAMUSCULAR; INTRAVENOUS ONCE
Status: COMPLETED | OUTPATIENT
Start: 2017-12-03 | End: 2017-12-03

## 2017-12-03 RX ORDER — CLINDAMYCIN PHOSPHATE 600 MG/50ML
600 INJECTION, SOLUTION INTRAVENOUS
Status: ACTIVE | OUTPATIENT
Start: 2017-12-04 | End: 2017-12-04

## 2017-12-03 RX ORDER — SOLIFENACIN SUCCINATE 5 MG/1
10 TABLET, FILM COATED ORAL DAILY
Status: DISCONTINUED | OUTPATIENT
Start: 2017-12-03 | End: 2017-12-12 | Stop reason: HOSPADM

## 2017-12-03 RX ORDER — ACETAMINOPHEN 325 MG/1
650 TABLET ORAL EVERY 8 HOURS PRN
Status: DISCONTINUED | OUTPATIENT
Start: 2017-12-03 | End: 2017-12-12 | Stop reason: HOSPADM

## 2017-12-03 RX ORDER — HYDROCODONE BITARTRATE AND ACETAMINOPHEN 5; 325 MG/1; MG/1
1 TABLET ORAL EVERY 6 HOURS PRN
Status: DISCONTINUED | OUTPATIENT
Start: 2017-12-03 | End: 2017-12-03

## 2017-12-03 RX ORDER — MORPHINE SULFATE 10 MG/ML
2 INJECTION INTRAMUSCULAR; INTRAVENOUS; SUBCUTANEOUS ONCE
Status: COMPLETED | OUTPATIENT
Start: 2017-12-03 | End: 2017-12-03

## 2017-12-03 RX ORDER — CLINDAMYCIN PHOSPHATE 150 MG/ML
600 INJECTION, SOLUTION INTRAVENOUS
Status: DISCONTINUED | OUTPATIENT
Start: 2017-12-04 | End: 2017-12-03

## 2017-12-03 RX ORDER — DOCUSATE SODIUM 100 MG/1
100 CAPSULE, LIQUID FILLED ORAL 2 TIMES DAILY PRN
Status: DISCONTINUED | OUTPATIENT
Start: 2017-12-03 | End: 2017-12-12 | Stop reason: HOSPADM

## 2017-12-03 RX ORDER — MORPHINE SULFATE 2 MG/ML
2 INJECTION, SOLUTION INTRAMUSCULAR; INTRAVENOUS ONCE
Status: COMPLETED | OUTPATIENT
Start: 2017-12-03 | End: 2017-12-03

## 2017-12-03 RX ORDER — NAPROXEN SODIUM 220 MG/1
81 TABLET, FILM COATED ORAL DAILY
Status: DISCONTINUED | OUTPATIENT
Start: 2017-12-03 | End: 2017-12-03

## 2017-12-03 RX ORDER — LEVOTHYROXINE SODIUM 50 UG/1
50 TABLET ORAL
Status: DISCONTINUED | OUTPATIENT
Start: 2017-12-04 | End: 2017-12-12 | Stop reason: HOSPADM

## 2017-12-03 RX ORDER — LISINOPRIL 20 MG/1
20 TABLET ORAL DAILY
Status: DISCONTINUED | OUTPATIENT
Start: 2017-12-03 | End: 2017-12-03

## 2017-12-03 RX ADMIN — HYDRALAZINE HYDROCHLORIDE 10 MG: 20 INJECTION INTRAMUSCULAR; INTRAVENOUS at 02:12

## 2017-12-03 RX ADMIN — SUCRALFATE 1 G: 1 TABLET ORAL at 12:12

## 2017-12-03 RX ADMIN — IPRATROPIUM BROMIDE 500 MCG: 0.5 SOLUTION RESPIRATORY (INHALATION) at 07:12

## 2017-12-03 RX ADMIN — AMIODARONE HYDROCHLORIDE 200 MG: 200 TABLET ORAL at 09:12

## 2017-12-03 RX ADMIN — POTASSIUM CHLORIDE 40 MEQ: 20 SOLUTION ORAL at 09:12

## 2017-12-03 RX ADMIN — SUCRALFATE 1 G: 1 TABLET ORAL at 05:12

## 2017-12-03 RX ADMIN — IPRATROPIUM BROMIDE 500 MCG: 0.5 SOLUTION RESPIRATORY (INHALATION) at 11:12

## 2017-12-03 RX ADMIN — LISINOPRIL 20 MG: 20 TABLET ORAL at 02:12

## 2017-12-03 RX ADMIN — HYDROCODONE BITARTRATE AND ACETAMINOPHEN 10 ML: 7.5; 325 SOLUTION ORAL at 05:12

## 2017-12-03 RX ADMIN — TRAMADOL HYDROCHLORIDE 50 MG: 50 TABLET, COATED ORAL at 05:12

## 2017-12-03 RX ADMIN — FUROSEMIDE 20 MG: 20 TABLET ORAL at 09:12

## 2017-12-03 RX ADMIN — SOLIFENACIN SUCCINATE 10 MG: 5 TABLET, FILM COATED ORAL at 09:12

## 2017-12-03 RX ADMIN — TRAMADOL HYDROCHLORIDE 50 MG: 50 TABLET, COATED ORAL at 09:12

## 2017-12-03 RX ADMIN — ENOXAPARIN SODIUM 40 MG: 100 INJECTION SUBCUTANEOUS at 10:12

## 2017-12-03 RX ADMIN — PANTOPRAZOLE SODIUM 40 MG: 40 TABLET, DELAYED RELEASE ORAL at 09:12

## 2017-12-03 RX ADMIN — MORPHINE SULFATE 2 MG: 2 INJECTION, SOLUTION INTRAMUSCULAR; INTRAVENOUS at 12:12

## 2017-12-03 RX ADMIN — HYDROCODONE BITARTRATE AND ACETAMINOPHEN 1 TABLET: 5; 325 TABLET ORAL at 04:12

## 2017-12-03 RX ADMIN — LISINOPRIL 20 MG: 20 TABLET ORAL at 09:12

## 2017-12-03 RX ADMIN — METOPROLOL SUCCINATE 25 MG: 25 TABLET, EXTENDED RELEASE ORAL at 09:12

## 2017-12-03 RX ADMIN — MORPHINE SULFATE 2 MG: 10 INJECTION INTRAVENOUS at 10:12

## 2017-12-03 NOTE — HPI
Patient is an 86 yo female with PMHx of HTN, Oa, CAD, hypothyroidism presenting to the ED after a fall.  Patient resides at [facility] facility.  Patient reports she was at home and tripped over her house shoes causing her to fall.   She denies trauma to head or LOC during event. She reports pain in her right hip and right leg since the event.  Patient denies any other injuries. She denies weakness, dizziness, feeling lightheaded, headaches, tinnitus, n/v, f/c, sob, chest pain, abdominal pain, dysuria, diarrhea.  Denies recent illnesses.

## 2017-12-03 NOTE — ED PROVIDER NOTES
Encounter Date: 12/3/2017    SCRIBE #1 NOTE: I, Jeremiah Valdez, am scribing for, and in the presence of,  Scott Mccartney MD. I have scribed the entire note.       History     Chief Complaint   Patient presents with    Fall     Tripped and fell just PTA - presents with c/o right hip pain. Deformity noted with shortening and external rotation.      Time patient was seen by the provider: 1:33 AM      The patient is a 87 y.o. female with hx of: HTN,  that presents to the ED with a complaint of trip and fall at home on her house shoes. She reports constant pain in the right hip since the event. She denies LOC during the event or head/neck/back pain. The patient denies other injuries. She did not feel weak, have CP/SOB, palpitations, dizziness, or any other symptoms preceding the fall. She denies prior episodes or injuries.          Review of patient's allergies indicates:   Allergen Reactions    Actos [pioglitazone]     Amlodipine     Bentyl [dicyclomine]     Benzocaine     Declomycin [demeclocycline]     Doxycycline     Erythropoietin analogues     Gabapentin     Hydrocodone     Lipitor [atorvastatin]     Losartan     Metronidazole     Nortriptyline     Oxybutynin     Pcn [penicillins]     Potassium     Pravachol [pravastatin]     Procaine     Promethazine     Propranolol     Ramipril     Sulfa (sulfonamide antibiotics)      Past Medical History:   Diagnosis Date    Coronary artery disease     Diabetes mellitus     GERD (gastroesophageal reflux disease)     Glaucoma     Hypertension     Stroke     Thyroid disease      Past Surgical History:   Procedure Laterality Date    APPENDECTOMY      CARDIAC SURGERY      CHOLECYSTECTOMY      EYE SURGERY      HYSTERECTOMY       Family History   Problem Relation Age of Onset    Prostate cancer Neg Hx     Kidney disease Neg Hx      Social History   Substance Use Topics    Smoking status: Never Smoker    Smokeless tobacco: Never Used    Alcohol use No      Review of Systems   Constitutional: Negative for fever.   HENT: Negative for sore throat.    Respiratory: Negative for shortness of breath.    Cardiovascular: Negative for chest pain.   Gastrointestinal: Negative for nausea.   Genitourinary: Negative for dysuria.   Musculoskeletal: Positive for arthralgias and gait problem. Negative for back pain, joint swelling, myalgias, neck pain and neck stiffness.   Skin: Negative for rash.   Neurological: Negative for weakness.   Hematological: Does not bruise/bleed easily.       Physical Exam     Initial Vitals [12/03/17 0126]   BP Pulse Resp Temp SpO2   (!) 190/66 66 18 97.9 °F (36.6 °C) 96 %      MAP       107.33         Physical Exam    Nursing note and vitals reviewed.  Constitutional: She appears well-developed.   HENT:   Head: Normocephalic and atraumatic.   Mouth/Throat: Oropharynx is clear and moist.   Eyes: Conjunctivae and EOM are normal. Pupils are equal, round, and reactive to light.   Neck: Neck supple.   Cardiovascular: Normal rate, regular rhythm, normal heart sounds and intact distal pulses. Exam reveals no gallop and no friction rub.    No murmur heard.  Pulmonary/Chest: Breath sounds normal. She has no wheezes. She has no rhonchi. She has no rales.   Abdominal: Soft. She exhibits no distension. There is no tenderness.   Musculoskeletal:        Right hip: She exhibits tenderness, bony tenderness and deformity.        Left hip: She exhibits no tenderness, no bony tenderness and no deformity.   RLE shortened and externally rotated, R hip TTP, distal movement, sensation, pulses intact   Neurological: She is alert and oriented to person, place, and time.   Skin: Capillary refill takes less than 2 seconds. No rash noted. No erythema.   Psychiatric: She has a normal mood and affect.         ED Course   Procedures  Labs Reviewed - No data to display     Imaging Results          X-Ray Hip 2 or 3 views Right (Final result)  Result time 12/05/17 17:04:09    Final  result by Naif Whitley MD (12/05/17 17:04:09)                 Impression:      No complication seen.      Electronically signed by: NAIF WHITLEY MD  Date:     12/05/17  Time:    17:04              Narrative:    Right hip 2 views.  Comparison: 12/3/17.    Postsurgical changes are visualized consistent with right hip arthroplasty.  No hardware complication seen.  No evidence of new fracture or dislocation.  Expected postoperative changes are seen in the overlying soft tissues.                             SURG FL Surgery Fluoro Less Than 1 Hour (Final result)  Result time 12/05/17 15:11:48    Final result by Elena Rome (12/05/17 15:11:48)                 Impression:    See OP Notes for results.             This procedure was auto-finalized by: [facility]                 Narrative:    See OP Notes for results.                                X-Ray Chest 1 View (Final result)  Result time 12/03/17 08:23:06   Procedure changed from X-Ray Chest PA And Lateral     Final result by John Cornejo DO (12/03/17 08:23:06)                 Impression:        1. As above      Electronically signed by: JOHN CORNEJO D.O.  Date:     12/03/17  Time:    08:23              Narrative:    Single view chest    Comparison:09/03/2017    Findings:    There is some mild blunting of the left costophrenic angle.  This may either relate to a tiny effusion or some scarring/atelectasis in this region.  Chronic increased peribronchial markings are noted throughout the chest.The heart is enlarged.  There is evidence for prior median sternotomy..                             X-Ray Femur Ap/Lat Right (Final result)  Result time 12/03/17 02:33:59    Final result by Quinn Little MD (12/03/17 02:33:59)                 Impression:        Mildly displaced right femoral neck fracture.      Electronically signed by: Quinn Little  Date:     12/03/17  Time:    02:33              Narrative:    COMPARISON: None.    CLINICAL  INFORMATION: Right hip pain.    FINDINGS: Two views of the right femur.  Examination is limited by soft tissue attenuation of the x-ray beam. There appears to be a mildly displaced right femoral neck fracture. No additional fractures are identified. Scattered surgical clips in the medial thigh soft tissues.                             X-Ray Hips Bilateral 2 View Incl AP Pelvis (Final result)  Result time 12/03/17 02:33:45    Final result by Ethan Serna MD (12/03/17 02:33:45)                 Impression:       1.  Acute displaced and impacted transcervical right femoral neck fracture.    2.  Acute displaced fracture of the right inferior pubic ramus.    3.  No evidence of a fracture of the left hip.    4.  Diffuse osteopenia and advanced osteoarthrosis.              Electronically signed by: ETHAN SERNA MD  Date:     12/03/17  Time:    02:33              Narrative:    Exam: [id number]  12/03/17  02:05:05 [id number] ([facility]) : XR HIPS BILATERAL 2 VIEW INCL AP PELVIS    Technique:    Frontal and lateral radiographs of both hips.    Comparison:    CT scan of the abdomen and pelvis dated 10/12/17    Findings:      Right hip:  There is an acute impacted transcervical femoral neck fracture.  There is caudal displacement of distal fracture component.  There is diffuse osteopenia.  There is no evidence of a dislocation.    Left hip:  There is diffuse osteopenia.  There is marked joint space narrowing of the left femoral acetabular joint.  There is no evidence of fracture or dislocation the left hip.    Pelvis:  There is also an acute displaced fracture of the inferior pubic ramus on the right.  The reminder of the pelvic ring appears grossly intact. There is joint space narrowing narrowing the sacroiliac joints.  There are degenerative changes of the visualized lumbar spine.    Vascular calcifications are present.  There is large amount of stool within the rectal vault.                             X-Ray Knee 3 View Right (Final  result)  Result time 12/03/17 02:23:41    Final result by Ethan Serna MD (12/03/17 02:23:41)                 Impression:       No evidence of a fracture or dislocation of the right knee.    Small suprapatellar joint effusion.    Diffuse osteopenia.              Electronically signed by: ETHAN SERNA MD  Date:     12/03/17  Time:    02:23              Narrative:    Exam: [id number]  12/03/17  02:04:54 [id number] ([facility]) : XR KNEE 3 VIEW RIGHT    Technique:    3 -x-ray views of the right knee.    Comparison:     None     Findings:      There is diffuse demineralization of the osseous structures.  There is an enthesophyte at the insertion of the patellar tendon.  There is nonspecific thickening of the quadriceps tendon. There is moderate tricompartmental joint space narrowing of the right knee.  There is moderate suprapatellar joint effusion.  There is no evidence of a displaced fracture.  There is no evidence of a dislocation. Metallic clips identified along the medial aspect of the right thigh.  Vascular calcifications are present.                                 Medical Decision Making:   Initial Assessment:   86 Y/O male presents to the ED with right hip pain, gross deformity after a mechanical trip and fall. Exam consistent with deformity at the hip, the leg is sensory/motor/neuro intact. X-rays ordered.  Differential Diagnosis:   Differential Diagnosis includes, but is not limited to:  Fracture, dislocation, compartment syndrome, nerve injury/palsy, vascular injury, rhabdomyolysis, hemarthrosis, septic joint, bursitis, muscle strain, ligament tear/sprain, laceration with foreign body, abrasion, soft tissue contusion, osteoarthritis.    Clinical Tests:   Radiological Study: Ordered and Reviewed  ED Management:  X-ray with R femoral neck fx.    1:52 AM Discussed case with orthopedics, request admit to medicine, will consult inpatient.    1:52 AM Discussed case with [facility] who will admit.  Upon re-evaluation, the patient's  status has remained stable.    At this time, I believe the patient should be admitted to the hospital for further evaluation and management of R hip fx..    [facility] was contacted and the case was discussed. The consulting physician agrees with plan and will admit under their service. Additional recommendations at this time: none    The patient and family were updated with test results, overall impression, and further plan of care. All questions were answered. The patient expressed understanding and agreed with the current plan.                       ED Course      Clinical Impression:     1. Closed displaced fracture of right femoral neck    2. Right hip pain    3. Fall          Disposition:   Disposition: Admitted  Condition: Stable       I, Dr. Scott Mccartney, personally performed the services described in this documentation. All medical record entries made by the scribe were at my direction and in my presence.  I have reviewed the chart and agree that the record reflects my personal performance and is accurate and complete.     Scott Mccartney MD.                 Scott Mccartney MD  12/11/17 0958

## 2017-12-03 NOTE — SUBJECTIVE & OBJECTIVE
Past Medical History:   Diagnosis Date    Coronary artery disease     Diabetes mellitus     GERD (gastroesophageal reflux disease)     Glaucoma     Hypertension     Stroke     Thyroid disease        Past Surgical History:   Procedure Laterality Date    APPENDECTOMY      CARDIAC SURGERY      CHOLECYSTECTOMY      EYE SURGERY      HYSTERECTOMY         Review of patient's allergies indicates:   Allergen Reactions    Actos [pioglitazone]     Amlodipine     Bentyl [dicyclomine]     Benzocaine     Declomycin [demeclocycline]     Doxycycline     Erythropoietin analogues     Gabapentin     Hydrocodone     Lipitor [atorvastatin]     Losartan     Metronidazole     Nortriptyline     Oxybutynin     Pcn [penicillins]     Potassium     Pravachol [pravastatin]     Procaine     Promethazine     Propranolol     Ramipril     Sulfa (sulfonamide antibiotics)        No current facility-administered medications on file prior to encounter.      Current Outpatient Prescriptions on File Prior to Encounter   Medication Sig    acetaminophen (TYLENOL) 650 MG TbSR Take 1 tablet (650 mg total) by mouth every 12 (twelve) hours as needed (start with daily prn).    amiodarone (PACERONE) 200 MG Tab TAKE ONE TABLET BY MOUTH TWICE DAILY    aspirin 81 MG Chew Take 1 tablet (81 mg total) by mouth once daily.    bimatoprost (LUMIGAN) 0.03 % ophthalmic drops Place 1 drop into both eyes nightly.    brimonidine-timolol (COMBIGAN) 0.2-0.5 % Drop Place 1 drop into the left eye 2 (two) times daily.    docusate sodium (COLACE) 100 MG capsule Take 1 capsule (100 mg total) by mouth 2 (two) times daily as needed for Constipation.    dorzolamide (TRUSOPT) 2 % ophthalmic solution Place 1 drop into the left eye 2 (two) times daily.    esomeprazole (NEXIUM) 40 MG capsule Take 1 capsule (40 mg total) by mouth before breakfast.    furosemide (LASIX) 20 MG tablet Take 1 tablet (20 mg total) by mouth once daily. May take an extra  20 mg weekly prn.    hyoscyamine (ANASPAZ,LEVSIN) 0.125 mg Tab Take 125 mcg by mouth every 4 (four) hours as needed.    ipratropium (ATROVENT) 0.02 % nebulizer solution Take 500 mcg by nebulization 4 (four) times daily. Rescue    ipratropium (ATROVENT) 0.06 % nasal spray USE 1 SPRAY INTO EACH NOSTRIL TWICE DAILY AS NEEDED FOR RHINITIS    levocetirizine (XYZAL) 5 MG tablet TAKE ONE TABLET BY MOUTH EVERY MORNING    levothyroxine (SYNTHROID) 50 MCG tablet Take 1 tablet (50 mcg total) by mouth once daily.    lisinopril (PRINIVIL,ZESTRIL) 20 MG tablet TAKE ONE TABLET BY MOUTH EVERY DAY    METAMUCIL 0.52 gram capsule TAKE 2 TO 6 CAPSULES BY MOUTH DAILY OR TAKE 5 CAPSULES UP TO FOUR TIMES DAILY    metoprolol succinate (TOPROL-XL) 25 MG 24 hr tablet Take 1 tablet (25 mg total) by mouth once daily.    nitroGLYCERIN (NITROSTAT) 0.4 MG SL tablet Place 0.4 mg under the tongue every 5 (five) minutes as needed for Chest pain.    olopatadine (PATADAY) 0.2 % Drop Place 1 drop into both eyes once daily.    PSYLLIUM HUSK/CALCIUM CARB (METAMUCIL PLUS CALCIUM ORAL) Take by mouth.    solifenacin (VESICARE) 10 MG tablet Take 1 tablet (10 mg total) by mouth once daily.    sucralfate (CARAFATE) 1 gram tablet Take 1 g by mouth 4 (four) times daily.    tramadol (ULTRAM) 50 mg tablet Take 50 mg by mouth every 6 (six) hours as needed for Pain.    trolamine salicylate (ASPERCREME) 10 % cream Apply topically as needed.     Family History     None        Social History Main Topics    Smoking status: Never Smoker    Smokeless tobacco: Never Used    Alcohol use No    Drug use: No    Sexual activity: No     Review of Systems   Constitutional: Negative for appetite change, chills, fatigue and fever.   HENT: Positive for hearing loss. Negative for congestion and tinnitus.    Eyes: Negative for pain, discharge and itching.   Respiratory: Negative for cough, choking, chest tightness and shortness of breath.    Cardiovascular:  Positive for leg swelling. Negative for chest pain and palpitations.   Gastrointestinal: Positive for constipation. Negative for abdominal pain, blood in stool, diarrhea, nausea and vomiting.   Genitourinary: Negative for dysuria, enuresis, hematuria and urgency.   Musculoskeletal: Negative for arthralgias, back pain, neck pain and neck stiffness.        Right hip and leg pain   Skin: Negative for color change, pallor, rash and wound.   Neurological: Negative for dizziness, speech difficulty, weakness, light-headedness and headaches.   Psychiatric/Behavioral: Negative for agitation, behavioral problems and confusion.     Objective:     Vital Signs (Most Recent):  Temp: 97.9 °F (36.6 °C) (12/03/17 0126)  Pulse: 68 (12/03/17 0248)  Resp: 18 (12/03/17 0248)  BP: (!) 144/94 (12/03/17 0248)  SpO2: 96 % (12/03/17 0248) Vital Signs (24h Range):  Temp:  [97.9 °F (36.6 °C)] 97.9 °F (36.6 °C)  Pulse:  [66-68] 68  Resp:  [18] 18  SpO2:  [95 %-96 %] 96 %  BP: (143-190)/(63-94) 144/94     Weight: 81.2 kg (179 lb)  Body mass index is 29.79 kg/m².    Physical Exam   Constitutional: She is oriented to person, place, and time. She appears well-developed and well-nourished. No distress.   HENT:   Head: Normocephalic and atraumatic.   Nose: Nose normal.   Mouth/Throat: No oropharyngeal exudate.   Eyes: Conjunctivae and EOM are normal. Pupils are equal, round, and reactive to light. No scleral icterus.   Neck: Normal range of motion. Neck supple.   Cardiovascular: Normal rate, regular rhythm and normal heart sounds.    No murmur heard.  Pulmonary/Chest: Effort normal and breath sounds normal. No respiratory distress.   Abdominal: Soft. She exhibits no distension.   Musculoskeletal: Normal range of motion. She exhibits edema, tenderness and deformity.   2+ bilateral pitting edema to knees   Shortened and externally rotated right hip. Diffuse tenderness   Neurological: She is alert and oriented to person, place, and time. No cranial  nerve deficit. She exhibits normal muscle tone. Coordination normal.   Sensation and motor intact and equal in bilateral lower extremity.  2+ DP and TP pulses    Skin: Skin is warm and dry. Capillary refill takes less than 2 seconds. She is not diaphoretic.   Psychiatric: She has a normal mood and affect. Her behavior is normal. Judgment and thought content normal.   Nursing note and vitals reviewed.        CRANIAL NERVES     CN III, IV, VI   Pupils are equal, round, and reactive to light.  Extraocular motions are normal.        Significant Labs:   CBC:   Recent Labs  Lab 12/03/17 0315   WBC 6.55   HGB 8.8*   HCT 27.4*        CMP:   Recent Labs  Lab 12/03/17 0315   *   K 3.3*   CL 99   CO2 25      BUN 16   CREATININE 1.0   CALCIUM 8.2*   PROT 6.7   ALBUMIN 2.8*   BILITOT 0.7   ALKPHOS 74   AST 65*   ALT 35   ANIONGAP 11   EGFRNONAA 51*     Coagulation:   Recent Labs  Lab 12/03/17 0315   INR 1.1   APTT 25.9       Significant Imaging: I have reviewed all pertinent imaging results/findings within the past 24 hours.

## 2017-12-03 NOTE — PLAN OF CARE
Subjective:      Principal Problem:Closed displaced fracture of right femoral neck     Chief Complaint:        Chief Complaint   Patient presents with    Fall       Tripped and fell just PTA - presents with c/o right hip pain. Deformity noted with shortening and external rotation.      Pt lives at [facility], no HH, has rollator and grab bars in bath, has supportive family, son and daughter in law, Luis and Jeronimo Hastings. Facility usually can provide transportation for her.       12/03/17 1751   Discharge Assessment   Assessment Type Discharge Planning Assessment   Confirmed/corrected address and phone number on facesheet? Yes   Assessment information obtained from? Patient   Expected Length of Stay (days) 3   Communicated expected length of stay with patient/caregiver yes   Prior to hospitilization cognitive status: Alert/Oriented   Prior to hospitalization functional status: Assistive Equipment   Current cognitive status: Alert/Oriented   Current Functional Status: Assistive Equipment;Needs Assistance   Facility Arrived From: [facility]   Lives With alone   Able to Return to Prior Arrangements yes   Is patient able to care for self after discharge? Unable to determine at this time (comments)   Who are your caregiver(s) and their phone number(s)? Son Luis Hastings [phone], daughter-in-law: Jeronimo Hastings [phone]   Patient's perception of discharge disposition assisted living   Readmission Within The Last 30 Days no previous admission in last 30 days   Patient currently being followed by outpatient case management? No   Patient currently receives any other outside agency services? No   Equipment Currently Used at Home rollator;grab bar   Do you have any problems affording any of your prescribed medications? No   Is the patient taking medications as prescribed? yes   Does the patient have transportation home? Yes   Transportation Available agency transportation    Dialysis Name and Scheduled days N/A   Does the patient receive services at [facility]? No   Discharge Plan A Home Health   Discharge Plan B Skilled Nursing Facility   Patient/Family In Agreement With Plan yes     Priya Bradley RN Transitional Navigator  [phone]

## 2017-12-03 NOTE — CONSULTS
"[facility] Consult    REASON FOR CONSULT:  Fall [W19.XXXA]  Right hip pain [M25.551]  Closed displaced fracture of right femoral neck [S72.001A]    SUBJECTIVE:     HISTORY OF PRESENT ILLNESS:  Alyssa Hastings is a 87 y.o. female who lives nearby at an assisted living home who tripped and fell, sustaining a right hip fracture. She denies LOC or hitting her head. Denies numbness/paresthesias or head/neck pain. She states that she fell onto her right knee and complains of some pain on her patella as well. Unable to walk secondary to right hip pain. She reports having "many" medical problems including previous coronary bipass surgery. Her daughter is at the bedside.      MEDICATIONS:  Home Medications:  No current facility-administered medications on file prior to encounter.      Current Outpatient Prescriptions on File Prior to Encounter   Medication Sig Dispense Refill    acetaminophen (TYLENOL) 650 MG TbSR Take 1 tablet (650 mg total) by mouth every 12 (twelve) hours as needed (start with daily prn). 60 tablet 1    amiodarone (PACERONE) 200 MG Tab TAKE ONE TABLET BY MOUTH TWICE DAILY 60 tablet 5    aspirin 81 MG Chew Take 1 tablet (81 mg total) by mouth once daily. 100 tablet 3    bimatoprost (LUMIGAN) 0.03 % ophthalmic drops Place 1 drop into both eyes nightly. 2.5 mL 6    brimonidine-timolol (COMBIGAN) 0.2-0.5 % Drop Place 1 drop into the left eye 2 (two) times daily. 10 mL 6    docusate sodium (COLACE) 100 MG capsule Take 1 capsule (100 mg total) by mouth 2 (two) times daily as needed for Constipation. 60 capsule 0    dorzolamide (TRUSOPT) 2 % ophthalmic solution Place 1 drop into the left eye 2 (two) times daily. 10 mL 6    esomeprazole (NEXIUM) 40 MG capsule Take 1 capsule (40 mg total) by mouth before breakfast. 30 capsule 5    furosemide (LASIX) 20 MG tablet Take 1 tablet (20 mg total) by mouth once daily. May take an extra 20 mg weekly prn. 40 tablet 5    hyoscyamine (ANASPAZ,LEVSIN) 0.125 mg Tab " Take 125 mcg by mouth every 4 (four) hours as needed.      ipratropium (ATROVENT) 0.02 % nebulizer solution Take 500 mcg by nebulization 4 (four) times daily. Rescue      ipratropium (ATROVENT) 0.06 % nasal spray USE 1 SPRAY INTO EACH NOSTRIL TWICE DAILY AS NEEDED FOR RHINITIS 15 mL 1    levocetirizine (XYZAL) 5 MG tablet TAKE ONE TABLET BY MOUTH EVERY MORNING 30 tablet 3    levothyroxine (SYNTHROID) 50 MCG tablet Take 1 tablet (50 mcg total) by mouth once daily. 30 tablet 5    lisinopril (PRINIVIL,ZESTRIL) 20 MG tablet TAKE ONE TABLET BY MOUTH EVERY DAY 30 tablet 5    METAMUCIL 0.52 gram capsule TAKE 2 TO 6 CAPSULES BY MOUTH DAILY OR TAKE 5 CAPSULES UP TO FOUR TIMES DAILY 100 capsule 1    metoprolol succinate (TOPROL-XL) 25 MG 24 hr tablet Take 1 tablet (25 mg total) by mouth once daily. 30 tablet 3    nitroGLYCERIN (NITROSTAT) 0.4 MG SL tablet Place 0.4 mg under the tongue every 5 (five) minutes as needed for Chest pain.      olopatadine (PATADAY) 0.2 % Drop Place 1 drop into both eyes once daily. 2.5 mL 6    PSYLLIUM HUSK/CALCIUM CARB (METAMUCIL PLUS CALCIUM ORAL) Take by mouth.      solifenacin (VESICARE) 10 MG tablet Take 1 tablet (10 mg total) by mouth once daily. 30 tablet 11    sucralfate (CARAFATE) 1 gram tablet Take 1 g by mouth 4 (four) times daily.      tramadol (ULTRAM) 50 mg tablet Take 50 mg by mouth every 6 (six) hours as needed for Pain.      trolamine salicylate (ASPERCREME) 10 % cream Apply topically as needed.       Inpatient Medications:   amiodarone  200 mg Oral BID    [START ON 12/4/2017] clindamycin  600 mg Intravenous Once Pre-Op    furosemide  20 mg Oral Daily    ipratropium  500 mcg Nebulization QID    levothyroxine  50 mcg Oral Daily    lisinopril  20 mg Oral Daily    metoprolol succinate  25 mg Oral Daily    morphine  2 mg Intravenous Once    pantoprazole  40 mg Oral Daily    psyllium  1 packet Oral Daily    solifenacin  10 mg Oral Daily    sucralfate  1 g Oral  QID     Infusions:   PRN Medications:acetaminophen, dextrose 50%, dextrose 50%, docusate sodium, glucagon (human recombinant), glucose, glucose, hydrocodone-acetaminophen 5-325mg, insulin aspart, morphine, sodium chloride 0.9%, traMADol    ALLERGIES:    Review of patient's allergies indicates:   Allergen Reactions    Actos [pioglitazone]     Amlodipine     Bentyl [dicyclomine]     Benzocaine     Declomycin [demeclocycline]     Doxycycline     Erythropoietin analogues     Gabapentin     Hydrocodone     Lipitor [atorvastatin]     Losartan     Metronidazole     Nortriptyline     Oxybutynin     Pcn [penicillins]     Potassium     Pravachol [pravastatin]     Procaine     Promethazine     Propranolol     Ramipril     Sulfa (sulfonamide antibiotics)        PAST MEDICAL HISTORY:    Past Medical History:   Diagnosis Date    Coronary artery disease     Diabetes mellitus     GERD (gastroesophageal reflux disease)     Glaucoma     Hypertension     Stroke     Thyroid disease        SURGICAL HISTORY:  Past Surgical History:   Procedure Laterality Date    APPENDECTOMY      CARDIAC SURGERY      CHOLECYSTECTOMY      EYE SURGERY      HYSTERECTOMY         FAMILY HISTORY:  Family History   Problem Relation Age of Onset    Prostate cancer Neg Hx     Kidney disease Neg Hx        SOCIAL HISTORY:  Social History   Substance Use Topics    Smoking status: Never Smoker    Smokeless tobacco: Never Used    Alcohol use No        REVIEW OF SYSTEMS:  A 10-point review of systems is negative except for the above mentioned in the HPI.    OBJECTIVE:     Most Recent Vitals:  Temp: 97.9 °F (36.6 °C) (12/03/17 0730)  Pulse: 67 (12/03/17 0753)  Resp: 18 (12/03/17 0753)  BP: (!) 177/74 (12/03/17 0730)  SpO2: 96 % (12/03/17 0753)      PHYSICAL EXAM:  AAO, NAD  Head normocephalic, atraumatic  Neck nontender, painless ROM  Respirations unlabored with good inspiratory effort  Heart regular rate and rhythm by  palpation  Abdomen soft, NT  BUE, LLE: atraumatic, painless ROM, NVI  RLE:  Skin intact  + hip tenderness, pain with any hip motion  Some patella tenderness to palpation, no crepitus or large joint effusion  EHL/FHL/TA/GS intact  SILT s/s/sp/dp/t  BCR      LABORATORY VALUES:    Recent Labs  Lab 12/03/17 0315 12/03/17 0317   WBC 6.55  --    HGB 8.8*  --    HCT 27.4*  --      --    HGBA1C  --  5.4     Recent Labs  Lab 12/03/17 0315 12/03/17  0542   *  --    K 3.3*  --    CL 99  --    CO2 25  --    BUN 16  --    CREATININE 1.0  --      --    CALCIUM 8.2*  --    PHOS  --  2.8   AST 65*  --    ALT 35  --    ALKPHOS 74  --    BILITOT 0.7  --    PROT 6.7  --    ALBUMIN 2.8*  --      Recent Labs  Lab 12/03/17 0315   INR 1.1   No results for input(s): PH, PCO2, PO2, HCO3 in the last 72 hours.    Recent Labs  Lab 12/03/17  0409   COLORU Yellow   APPEARANCEUA Clear   PHUR 7.0   SPECGRAV 1.010   RBCUA 5*   WBCUA 6*   BACTERIA Few*   OCCULTUA Negative   LEUKOCYTESUR Negative   NITRITE Negative   PROTEINUA 1+*   GLUCUA Negative   KETONESU Negative   BILIRUBINUA Negative   UROBILINOGEN 1.0     No results for input(s): LABURIN in the last 168 hours.No results for input(s): LABBLOO in the last 168 hours.  No results for input(s): LABURIN, LABBLOO in the last 168 hours.    DIAGNOSTIC STUDIES:  XRs show displaced right femoral neck fracture    ASSESSMENT/PLAN:     Alyssa Hastings is a 87 y.o. female with a right femoral neck fracture after a ground level fall  - bed rest, guadalupe  - will plan to take to OR tomorrow for right hip hemiarthroplasty if medically cleared  - patient consented, discussed with patient and daughter  - NPO after midnight      Kenton Crawford  PGY-3  [facility]

## 2017-12-03 NOTE — H&P
[facility] Medicine  History & Physical    Patient Name: Alyssa Hastings  MRN: [id number]  Admission Date: 12/3/2017  Attending Physician: Dr. Jarad Mejía MD  Primary Care Provider: Ashley Johnson MD         Patient information was obtained from patient and ER records.     Subjective:     Principal Problem:Closed displaced fracture of right femoral neck    Chief Complaint:   Chief Complaint   Patient presents with    Fall     Tripped and fell just PTA - presents with c/o right hip pain. Deformity noted with shortening and external rotation.         HPI: Patient is an 88 yo female with PMHx of HTN, Oa, CAD, hypothyroidism presenting to the ED after a fall.  Patient resides at [facility].  Patient reports she was at home and tripped over her house shoes causing her to fall.   She denies trauma to head or LOC during event. She reports pain in her right hip and right leg since the event.  Patient denies any other injuries. She denies weakness, dizziness, feeling lightheaded, headaches, tinnitus, n/v, f/c, sob, chest pain, abdominal pain, dysuria, diarrhea.  Denies recent illnesses.     Past Medical History:   Diagnosis Date    Coronary artery disease     Diabetes mellitus     GERD (gastroesophageal reflux disease)     Glaucoma     Hypertension     Stroke     Thyroid disease        Past Surgical History:   Procedure Laterality Date    APPENDECTOMY      CARDIAC SURGERY      CHOLECYSTECTOMY      EYE SURGERY      HYSTERECTOMY         Review of patient's allergies indicates:   Allergen Reactions    Actos [pioglitazone]     Amlodipine     Bentyl [dicyclomine]     Benzocaine     Declomycin [demeclocycline]     Doxycycline     Erythropoietin analogues     Gabapentin     Hydrocodone     Lipitor [atorvastatin]     Losartan     Metronidazole     Nortriptyline     Oxybutynin     Pcn [penicillins]     Potassium     Pravachol [pravastatin]     Procaine      Promethazine     Propranolol     Ramipril     Sulfa (sulfonamide antibiotics)        No current facility-administered medications on file prior to encounter.      Current Outpatient Prescriptions on File Prior to Encounter   Medication Sig    acetaminophen (TYLENOL) 650 MG TbSR Take 1 tablet (650 mg total) by mouth every 12 (twelve) hours as needed (start with daily prn).    amiodarone (PACERONE) 200 MG Tab TAKE ONE TABLET BY MOUTH TWICE DAILY    aspirin 81 MG Chew Take 1 tablet (81 mg total) by mouth once daily.    bimatoprost (LUMIGAN) 0.03 % ophthalmic drops Place 1 drop into both eyes nightly.    brimonidine-timolol (COMBIGAN) 0.2-0.5 % Drop Place 1 drop into the left eye 2 (two) times daily.    docusate sodium (COLACE) 100 MG capsule Take 1 capsule (100 mg total) by mouth 2 (two) times daily as needed for Constipation.    dorzolamide (TRUSOPT) 2 % ophthalmic solution Place 1 drop into the left eye 2 (two) times daily.    esomeprazole (NEXIUM) 40 MG capsule Take 1 capsule (40 mg total) by mouth before breakfast.    furosemide (LASIX) 20 MG tablet Take 1 tablet (20 mg total) by mouth once daily. May take an extra 20 mg weekly prn.    hyoscyamine (ANASPAZ,LEVSIN) 0.125 mg Tab Take 125 mcg by mouth every 4 (four) hours as needed.    ipratropium (ATROVENT) 0.02 % nebulizer solution Take 500 mcg by nebulization 4 (four) times daily. Rescue    ipratropium (ATROVENT) 0.06 % nasal spray USE 1 SPRAY INTO EACH NOSTRIL TWICE DAILY AS NEEDED FOR RHINITIS    levocetirizine (XYZAL) 5 MG tablet TAKE ONE TABLET BY MOUTH EVERY MORNING    levothyroxine (SYNTHROID) 50 MCG tablet Take 1 tablet (50 mcg total) by mouth once daily.    lisinopril (PRINIVIL,ZESTRIL) 20 MG tablet TAKE ONE TABLET BY MOUTH EVERY DAY    METAMUCIL 0.52 gram capsule TAKE 2 TO 6 CAPSULES BY MOUTH DAILY OR TAKE 5 CAPSULES UP TO FOUR TIMES DAILY    metoprolol succinate (TOPROL-XL) 25 MG 24 hr tablet Take 1 tablet (25 mg total) by mouth  once daily.    nitroGLYCERIN (NITROSTAT) 0.4 MG SL tablet Place 0.4 mg under the tongue every 5 (five) minutes as needed for Chest pain.    olopatadine (PATADAY) 0.2 % Drop Place 1 drop into both eyes once daily.    PSYLLIUM HUSK/CALCIUM CARB (METAMUCIL PLUS CALCIUM ORAL) Take by mouth.    solifenacin (VESICARE) 10 MG tablet Take 1 tablet (10 mg total) by mouth once daily.    sucralfate (CARAFATE) 1 gram tablet Take 1 g by mouth 4 (four) times daily.    tramadol (ULTRAM) 50 mg tablet Take 50 mg by mouth every 6 (six) hours as needed for Pain.    trolamine salicylate (ASPERCREME) 10 % cream Apply topically as needed.     Family History     None        Social History Main Topics    Smoking status: Never Smoker    Smokeless tobacco: Never Used    Alcohol use No    Drug use: No    Sexual activity: No     Review of Systems   Constitutional: Negative for appetite change, chills, fatigue and fever.   HENT: Positive for hearing loss. Negative for congestion and tinnitus.    Eyes: Negative for pain, discharge and itching.   Respiratory: Negative for cough, choking, chest tightness and shortness of breath.    Cardiovascular: Positive for leg swelling. Negative for chest pain and palpitations.   Gastrointestinal: Positive for constipation. Negative for abdominal pain, blood in stool, diarrhea, nausea and vomiting.   Genitourinary: Negative for dysuria, enuresis, hematuria and urgency.   Musculoskeletal: Negative for arthralgias, back pain, neck pain and neck stiffness.        Right hip and leg pain   Skin: Negative for color change, pallor, rash and wound.   Neurological: Negative for dizziness, speech difficulty, weakness, light-headedness and headaches.   Psychiatric/Behavioral: Negative for agitation, behavioral problems and confusion.     Objective:     Vital Signs (Most Recent):  Temp: 97.9 °F (36.6 °C) (12/03/17 0126)  Pulse: 68 (12/03/17 0248)  Resp: 18 (12/03/17 0248)  BP: (!) 144/94 (12/03/17 0248)  SpO2:  96 % (12/03/17 0248) Vital Signs (24h Range):  Temp:  [97.9 °F (36.6 °C)] 97.9 °F (36.6 °C)  Pulse:  [66-68] 68  Resp:  [18] 18  SpO2:  [95 %-96 %] 96 %  BP: (143-190)/(63-94) 144/94     Weight: 81.2 kg (179 lb)  Body mass index is 29.79 kg/m².    Physical Exam   Constitutional: She is oriented to person, place, and time. She appears well-developed and well-nourished. No distress.   HENT:   Head: Normocephalic and atraumatic.   Nose: Nose normal.   Mouth/Throat: No oropharyngeal exudate.   Eyes: Conjunctivae and EOM are normal. Pupils are equal, round, and reactive to light. No scleral icterus.   Neck: Normal range of motion. Neck supple.   Cardiovascular: Normal rate, regular rhythm and normal heart sounds.    No murmur heard.  Pulmonary/Chest: Effort normal and breath sounds normal. No respiratory distress.   Abdominal: Soft. She exhibits no distension.   Musculoskeletal: Normal range of motion. She exhibits edema, tenderness and deformity.   2+ bilateral pitting edema to knees   Shortened and externally rotated right hip. Diffuse tenderness   Neurological: She is alert and oriented to person, place, and time. No cranial nerve deficit. She exhibits normal muscle tone. Coordination normal.   Sensation and motor intact and equal in bilateral lower extremity.  2+ DP and TP pulses    Skin: Skin is warm and dry. Capillary refill takes less than 2 seconds. She is not diaphoretic.   Psychiatric: She has a normal mood and affect. Her behavior is normal. Judgment and thought content normal.   Nursing note and vitals reviewed.        CRANIAL NERVES     CN III, IV, VI   Pupils are equal, round, and reactive to light.  Extraocular motions are normal.        Significant Labs:   CBC:   Recent Labs  Lab 12/03/17  0315   WBC 6.55   HGB 8.8*   HCT 27.4*        CMP:   Recent Labs  Lab 12/03/17  0315   *   K 3.3*   CL 99   CO2 25      BUN 16   CREATININE 1.0   CALCIUM 8.2*   PROT 6.7   ALBUMIN 2.8*   BILITOT 0.7    ALKPHOS 74   AST 65*   ALT 35   ANIONGAP 11   EGFRNONAA 51*     Coagulation:   Recent Labs  Lab 12/03/17  0315   INR 1.1   APTT 25.9       Significant Imaging: I have reviewed all pertinent imaging results/findings within the past 24 hours.    Assessment/Plan:     * Closed displaced fracture of right femoral neck    - 2/2 mechanical fall   - Ortho consulted  - Norco 5mg q6 PRN and Tramadol 50 mg           Hypokalemia    - K of 3.3 on admit  - likely 2/2 lasix   - will replete and continue to monitor           Hyponatremia    - Na 135 on admit  - likely 2/2 lasix   - will replete and continue to monitor            CKD stage G3a/A1, GFR 45-59 and albumin creatinine ratio <30 mg/g    -eGFR of 51 on admit  - will hold nephrotoxic meds          Hypothyroidism    - continue synthroid 50 mcg          Essential hypertension    - acutely elevated in Ed.  Likely 2/2 to pain  - continue home medications- lisinopril 20 mg            VTE Risk Mitigation     None             Micki Quinonez MD  Department of Hospital Medicine   [facility]

## 2017-12-03 NOTE — ED TRIAGE NOTES
Pt presents to ED via [facility] from [facility] with right hip pain after a trip and fall. Pt states that she was walking to bathroom when she tripped over something and fell landing on her right side. Denies hitting head. Noted shortening to right leg. Pt also states that she has been having some swelling to bilateral lower ext as well. Cbg 94, Zofran 4mg given IV and Fentanyl 100mcg given IV per paramedic.

## 2017-12-03 NOTE — PLAN OF CARE
Problem: Patient Care Overview  Goal: Plan of Care Review  Outcome: Ongoing (interventions implemented as appropriate)  Pt on room air in no apparent distress.  Breathing tx. Given with good pt. Effort.  Will cont. To monitor.

## 2017-12-03 NOTE — ED NOTES
Spoke with [facility] family medicine, Dr Quinonez,  regarding pt's pain level, awaiting new orders. Also states to place a guadalupe.

## 2017-12-04 ENCOUNTER — ANESTHESIA (OUTPATIENT)
Dept: SURGERY | Facility: HOSPITAL | Age: 82
DRG: 956 | End: 2017-12-04
Payer: COMMERCIAL

## 2017-12-04 ENCOUNTER — TELEPHONE (OUTPATIENT)
Dept: GASTROENTEROLOGY | Facility: CLINIC | Age: 82
End: 2017-12-04

## 2017-12-04 LAB
ALBUMIN SERPL BCP-MCNC: 2.4 G/DL
ALP SERPL-CCNC: 70 U/L
ALT SERPL W/O P-5'-P-CCNC: 46 U/L
ANION GAP SERPL CALC-SCNC: 9 MMOL/L
APTT BLDCRRT: 30 SEC
AST SERPL-CCNC: 79 U/L
BASOPHILS # BLD AUTO: 0.04 K/UL
BASOPHILS # BLD AUTO: 0.04 K/UL
BASOPHILS NFR BLD: 0.5 %
BASOPHILS NFR BLD: 0.6 %
BILIRUB SERPL-MCNC: 0.9 MG/DL
BUN SERPL-MCNC: 15 MG/DL
CALCIUM SERPL-MCNC: 8 MG/DL
CHLORIDE SERPL-SCNC: 99 MMOL/L
CO2 SERPL-SCNC: 27 MMOL/L
CREAT SERPL-MCNC: 0.8 MG/DL
DIFFERENTIAL METHOD: ABNORMAL
DIFFERENTIAL METHOD: ABNORMAL
EOSINOPHIL # BLD AUTO: 0.3 K/UL
EOSINOPHIL # BLD AUTO: 0.4 K/UL
EOSINOPHIL NFR BLD: 4.2 %
EOSINOPHIL NFR BLD: 6.5 %
ERYTHROCYTE [DISTWIDTH] IN BLOOD BY AUTOMATED COUNT: 16.1 %
ERYTHROCYTE [DISTWIDTH] IN BLOOD BY AUTOMATED COUNT: 16.3 %
EST. GFR  (AFRICAN AMERICAN): >60 ML/MIN/1.73 M^2
EST. GFR  (NON AFRICAN AMERICAN): >60 ML/MIN/1.73 M^2
ESTIMATED PA SYSTOLIC PRESSURE: 52
GLUCOSE SERPL-MCNC: 98 MG/DL
HCT VFR BLD AUTO: 27.4 %
HCT VFR BLD AUTO: 28.3 %
HGB BLD-MCNC: 8.9 G/DL
HGB BLD-MCNC: 9.2 G/DL
INR PPP: 1.1
LYMPHOCYTES # BLD AUTO: 0.7 K/UL
LYMPHOCYTES # BLD AUTO: 1 K/UL
LYMPHOCYTES NFR BLD: 10 %
LYMPHOCYTES NFR BLD: 12.9 %
MAGNESIUM SERPL-MCNC: 1.5 MG/DL
MCH RBC QN AUTO: 30.1 PG
MCH RBC QN AUTO: 30.2 PG
MCHC RBC AUTO-ENTMCNC: 32.5 G/DL
MCHC RBC AUTO-ENTMCNC: 32.5 G/DL
MCV RBC AUTO: 93 FL
MCV RBC AUTO: 93 FL
MITRAL VALVE REGURGITATION: ABNORMAL
MONOCYTES # BLD AUTO: 0.8 K/UL
MONOCYTES # BLD AUTO: 0.8 K/UL
MONOCYTES NFR BLD: 10.7 %
MONOCYTES NFR BLD: 12.2 %
NEUTROPHILS # BLD AUTO: 4.6 K/UL
NEUTROPHILS # BLD AUTO: 5.4 K/UL
NEUTROPHILS NFR BLD: 70.5 %
NEUTROPHILS NFR BLD: 71.4 %
PHOSPHATE SERPL-MCNC: 3 MG/DL
PLATELET # BLD AUTO: 149 K/UL
PLATELET # BLD AUTO: 160 K/UL
PMV BLD AUTO: 10 FL
PMV BLD AUTO: 9.9 FL
POCT GLUCOSE: 107 MG/DL (ref 70–110)
POCT GLUCOSE: 108 MG/DL (ref 70–110)
POCT GLUCOSE: 113 MG/DL (ref 70–110)
POCT GLUCOSE: 117 MG/DL (ref 70–110)
POCT GLUCOSE: 119 MG/DL (ref 70–110)
POCT GLUCOSE: 121 MG/DL (ref 70–110)
POCT GLUCOSE: 129 MG/DL (ref 70–110)
POCT GLUCOSE: 134 MG/DL (ref 70–110)
POTASSIUM SERPL-SCNC: 3.3 MMOL/L
PROT SERPL-MCNC: 6 G/DL
PROTHROMBIN TIME: 11.8 SEC
RBC # BLD AUTO: 2.96 M/UL
RBC # BLD AUTO: 3.05 M/UL
RETIRED EF AND QEF - SEE NOTES: 55 (ref 55–65)
SODIUM SERPL-SCNC: 135 MMOL/L
TRICUSPID VALVE REGURGITATION: ABNORMAL
WBC # BLD AUTO: 6.47 K/UL
WBC # BLD AUTO: 7.58 K/UL

## 2017-12-04 PROCEDURE — 25000003 PHARM REV CODE 250: Performed by: FAMILY MEDICINE

## 2017-12-04 PROCEDURE — 63600175 PHARM REV CODE 636 W HCPCS: Performed by: FAMILY MEDICINE

## 2017-12-04 PROCEDURE — 25000003 PHARM REV CODE 250

## 2017-12-04 PROCEDURE — 36415 COLL VENOUS BLD VENIPUNCTURE: CPT

## 2017-12-04 PROCEDURE — 63600175 PHARM REV CODE 636 W HCPCS: Performed by: STUDENT IN AN ORGANIZED HEALTH CARE EDUCATION/TRAINING PROGRAM

## 2017-12-04 PROCEDURE — 85610 PROTHROMBIN TIME: CPT

## 2017-12-04 PROCEDURE — 85025 COMPLETE CBC W/AUTO DIFF WBC: CPT

## 2017-12-04 PROCEDURE — 94640 AIRWAY INHALATION TREATMENT: CPT

## 2017-12-04 PROCEDURE — 80053 COMPREHEN METABOLIC PANEL: CPT

## 2017-12-04 PROCEDURE — 11000001 HC ACUTE MED/SURG PRIVATE ROOM

## 2017-12-04 PROCEDURE — 94761 N-INVAS EAR/PLS OXIMETRY MLT: CPT

## 2017-12-04 PROCEDURE — 84100 ASSAY OF PHOSPHORUS: CPT

## 2017-12-04 PROCEDURE — 25000003 PHARM REV CODE 250: Performed by: STUDENT IN AN ORGANIZED HEALTH CARE EDUCATION/TRAINING PROGRAM

## 2017-12-04 PROCEDURE — 93306 TTE W/DOPPLER COMPLETE: CPT

## 2017-12-04 PROCEDURE — 83735 ASSAY OF MAGNESIUM: CPT

## 2017-12-04 PROCEDURE — 25000242 PHARM REV CODE 250 ALT 637 W/ HCPCS: Performed by: STUDENT IN AN ORGANIZED HEALTH CARE EDUCATION/TRAINING PROGRAM

## 2017-12-04 PROCEDURE — 85730 THROMBOPLASTIN TIME PARTIAL: CPT

## 2017-12-04 RX ORDER — MORPHINE SULFATE 10 MG/ML
1 INJECTION INTRAMUSCULAR; INTRAVENOUS; SUBCUTANEOUS EVERY 6 HOURS PRN
Status: DISCONTINUED | OUTPATIENT
Start: 2017-12-04 | End: 2017-12-12 | Stop reason: HOSPADM

## 2017-12-04 RX ORDER — TRAMADOL HYDROCHLORIDE 50 MG/1
50 TABLET ORAL EVERY 6 HOURS PRN
Status: DISCONTINUED | OUTPATIENT
Start: 2017-12-04 | End: 2017-12-12 | Stop reason: HOSPADM

## 2017-12-04 RX ORDER — POTASSIUM CHLORIDE 20 MEQ/15ML
20 SOLUTION ORAL ONCE
Status: DISCONTINUED | OUTPATIENT
Start: 2017-12-04 | End: 2017-12-04

## 2017-12-04 RX ORDER — HYDRALAZINE HYDROCHLORIDE 20 MG/ML
10 INJECTION INTRAMUSCULAR; INTRAVENOUS EVERY 6 HOURS PRN
Status: DISCONTINUED | OUTPATIENT
Start: 2017-12-04 | End: 2017-12-05

## 2017-12-04 RX ORDER — MORPHINE SULFATE 2 MG/ML
1 INJECTION, SOLUTION INTRAMUSCULAR; INTRAVENOUS EVERY 6 HOURS PRN
Status: DISCONTINUED | OUTPATIENT
Start: 2017-12-04 | End: 2017-12-04

## 2017-12-04 RX ORDER — POTASSIUM CHLORIDE 20 MEQ/15ML
40 SOLUTION ORAL ONCE
Status: COMPLETED | OUTPATIENT
Start: 2017-12-04 | End: 2017-12-04

## 2017-12-04 RX ORDER — MAGNESIUM SULFATE 1 G/100ML
1 INJECTION INTRAVENOUS ONCE
Status: COMPLETED | OUTPATIENT
Start: 2017-12-04 | End: 2017-12-04

## 2017-12-04 RX ADMIN — IPRATROPIUM BROMIDE 500 MCG: 0.5 SOLUTION RESPIRATORY (INHALATION) at 12:12

## 2017-12-04 RX ADMIN — OLOPATADINE HYDROCHLORIDE 1 DROP: 1 SOLUTION/ DROPS OPHTHALMIC at 08:12

## 2017-12-04 RX ADMIN — IPRATROPIUM BROMIDE 500 MCG: 0.5 SOLUTION RESPIRATORY (INHALATION) at 11:12

## 2017-12-04 RX ADMIN — ACETAMINOPHEN 650 MG: 325 TABLET ORAL at 08:12

## 2017-12-04 RX ADMIN — BIMATOPROST 1 DROP: 0.1 SOLUTION/ DROPS OPHTHALMIC at 08:12

## 2017-12-04 RX ADMIN — PANTOPRAZOLE SODIUM 40 MG: 40 TABLET, DELAYED RELEASE ORAL at 08:12

## 2017-12-04 RX ADMIN — TRAMADOL HYDROCHLORIDE 50 MG: 50 TABLET, COATED ORAL at 08:12

## 2017-12-04 RX ADMIN — TRAMADOL HYDROCHLORIDE 50 MG: 50 TABLET, COATED ORAL at 05:12

## 2017-12-04 RX ADMIN — ENOXAPARIN SODIUM 40 MG: 100 INJECTION SUBCUTANEOUS at 05:12

## 2017-12-04 RX ADMIN — SUCRALFATE 1 G: 1 TABLET ORAL at 05:12

## 2017-12-04 RX ADMIN — TRAMADOL HYDROCHLORIDE 50 MG: 50 TABLET, COATED ORAL at 10:12

## 2017-12-04 RX ADMIN — LISINOPRIL 40 MG: 20 TABLET ORAL at 08:12

## 2017-12-04 RX ADMIN — HYDRALAZINE HYDROCHLORIDE 10 MG: 20 INJECTION INTRAMUSCULAR; INTRAVENOUS at 06:12

## 2017-12-04 RX ADMIN — MAGNESIUM SULFATE 1 G: 1 INJECTION INTRAVENOUS at 12:12

## 2017-12-04 RX ADMIN — IPRATROPIUM BROMIDE 500 MCG: 0.5 SOLUTION RESPIRATORY (INHALATION) at 07:12

## 2017-12-04 RX ADMIN — SOLIFENACIN SUCCINATE 10 MG: 5 TABLET, FILM COATED ORAL at 08:12

## 2017-12-04 RX ADMIN — AMIODARONE HYDROCHLORIDE 200 MG: 200 TABLET ORAL at 08:12

## 2017-12-04 RX ADMIN — OLOPATADINE HYDROCHLORIDE 1 DROP: 1 SOLUTION/ DROPS OPHTHALMIC at 12:12

## 2017-12-04 RX ADMIN — FUROSEMIDE 20 MG: 20 TABLET ORAL at 08:12

## 2017-12-04 RX ADMIN — MORPHINE SULFATE 1 MG: 2 INJECTION, SOLUTION INTRAMUSCULAR; INTRAVENOUS at 10:12

## 2017-12-04 RX ADMIN — METOPROLOL SUCCINATE 25 MG: 25 TABLET, EXTENDED RELEASE ORAL at 08:12

## 2017-12-04 RX ADMIN — MORPHINE SULFATE 1 MG: 10 INJECTION INTRAVENOUS at 05:12

## 2017-12-04 RX ADMIN — IPRATROPIUM BROMIDE 500 MCG: 0.5 SOLUTION RESPIRATORY (INHALATION) at 04:12

## 2017-12-04 RX ADMIN — LEVOTHYROXINE SODIUM 50 MCG: 50 TABLET ORAL at 05:12

## 2017-12-04 RX ADMIN — POTASSIUM CHLORIDE 40 MEQ: 20 SOLUTION ORAL at 12:12

## 2017-12-04 RX ADMIN — SUCRALFATE 1 G: 1 TABLET ORAL at 12:12

## 2017-12-04 NOTE — PLAN OF CARE
Problem: Patient Care Overview  Goal: Plan of Care Review  Pt received on RA , no respiratory distress noted. Will cont to monitor.

## 2017-12-04 NOTE — CONSULTS
Cardiology    Consult Requested By:   Reason for Consult: preop clearance    SUBJECTIVE:     History of Present Illness:  Patient is a 87 y.o. female presents with history of hypertension; also had double vessel bypass after an abnormal stress test and then had a stent later. No cardiac issues for the past 7 years or so; she gives no history of chest pains or shortness of breath. She does move around slowly at home with a walker and yesterday, probably tripped on her house shoes but did not pass out. History of TIA and strokes in the past .       Review of patient's allergies indicates:   Allergen Reactions    Actos [pioglitazone]     Amlodipine     Bentyl [dicyclomine]     Benzocaine     Declomycin [demeclocycline]     Doxycycline     Erythropoietin analogues     Gabapentin     Hydrocodone     Lipitor [atorvastatin]     Losartan     Metronidazole     Nortriptyline     Oxybutynin     Pcn [penicillins]     Potassium     Pravachol [pravastatin]     Procaine     Promethazine     Propranolol     Ramipril     Sulfa (sulfonamide antibiotics)        Past Medical History:   Diagnosis Date    Coronary artery disease     Diabetes mellitus     GERD (gastroesophageal reflux disease)     Glaucoma     Hypertension     Stroke     Thyroid disease      Past Surgical History:   Procedure Laterality Date    APPENDECTOMY      CARDIAC SURGERY      CHOLECYSTECTOMY      EYE SURGERY      HYSTERECTOMY       Family History   Problem Relation Age of Onset    Prostate cancer Neg Hx     Kidney disease Neg Hx      Social History   Substance Use Topics    Smoking status: Never Smoker    Smokeless tobacco: Never Used    Alcohol use No        Home meds:  No current facility-administered medications on file prior to encounter.      Current Outpatient Prescriptions on File Prior to Encounter   Medication Sig Dispense Refill    acetaminophen (TYLENOL) 650 MG TbSR Take 1 tablet (650 mg total) by mouth  every 12 (twelve) hours as needed (start with daily prn). 60 tablet 1    amiodarone (PACERONE) 200 MG Tab TAKE ONE TABLET BY MOUTH TWICE DAILY 60 tablet 5    aspirin 81 MG Chew Take 1 tablet (81 mg total) by mouth once daily. 100 tablet 3    bimatoprost (LUMIGAN) 0.03 % ophthalmic drops Place 1 drop into both eyes nightly. 2.5 mL 6    brimonidine-timolol (COMBIGAN) 0.2-0.5 % Drop Place 1 drop into the left eye 2 (two) times daily. 10 mL 6    docusate sodium (COLACE) 100 MG capsule Take 1 capsule (100 mg total) by mouth 2 (two) times daily as needed for Constipation. 60 capsule 0    dorzolamide (TRUSOPT) 2 % ophthalmic solution Place 1 drop into the left eye 2 (two) times daily. 10 mL 6    esomeprazole (NEXIUM) 40 MG capsule Take 1 capsule (40 mg total) by mouth before breakfast. 30 capsule 5    furosemide (LASIX) 20 MG tablet Take 1 tablet (20 mg total) by mouth once daily. May take an extra 20 mg weekly prn. 40 tablet 5    hyoscyamine (ANASPAZ,LEVSIN) 0.125 mg Tab Take 125 mcg by mouth every 4 (four) hours as needed.      ipratropium (ATROVENT) 0.02 % nebulizer solution Take 500 mcg by nebulization 4 (four) times daily. Rescue      ipratropium (ATROVENT) 0.06 % nasal spray USE 1 SPRAY INTO EACH NOSTRIL TWICE DAILY AS NEEDED FOR RHINITIS 15 mL 1    levocetirizine (XYZAL) 5 MG tablet TAKE ONE TABLET BY MOUTH EVERY MORNING 30 tablet 3    levothyroxine (SYNTHROID) 50 MCG tablet Take 1 tablet (50 mcg total) by mouth once daily. 30 tablet 5    lisinopril (PRINIVIL,ZESTRIL) 20 MG tablet TAKE ONE TABLET BY MOUTH EVERY DAY 30 tablet 5    METAMUCIL 0.52 gram capsule TAKE 2 TO 6 CAPSULES BY MOUTH DAILY OR TAKE 5 CAPSULES UP TO FOUR TIMES DAILY 100 capsule 1    metoprolol succinate (TOPROL-XL) 25 MG 24 hr tablet Take 1 tablet (25 mg total) by mouth once daily. 30 tablet 3    nitroGLYCERIN (NITROSTAT) 0.4 MG SL tablet Place 0.4 mg under the tongue every 5 (five) minutes as needed for Chest pain.       olopatadine (PATADAY) 0.2 % Drop Place 1 drop into both eyes once daily. 2.5 mL 6    PSYLLIUM HUSK/CALCIUM CARB (METAMUCIL PLUS CALCIUM ORAL) Take by mouth.      solifenacin (VESICARE) 10 MG tablet Take 1 tablet (10 mg total) by mouth once daily. 30 tablet 11    sucralfate (CARAFATE) 1 gram tablet Take 1 g by mouth 4 (four) times daily.      tramadol (ULTRAM) 50 mg tablet Take 50 mg by mouth every 6 (six) hours as needed for Pain.      trolamine salicylate (ASPERCREME) 10 % cream Apply topically as needed.         Current meds:  Scheduled Meds:   amiodarone  200 mg Oral BID    bimatoprost  1 drop Both Eyes QHS    clindamycin (CLEOCIN) IVPB  600 mg Intravenous Once Pre-Op    enoxaparin  40 mg Subcutaneous Daily    furosemide  20 mg Oral Daily    ipratropium  500 mcg Nebulization QID    levothyroxine  50 mcg Oral Before breakfast    lisinopril  40 mg Oral Daily    metoprolol succinate  25 mg Oral Daily    olopatadine  1 drop Both Eyes BID    pantoprazole  40 mg Oral Daily    psyllium  1 packet Oral Daily    solifenacin  10 mg Oral Daily    sucralfate  1 g Oral QID     Continuous Infusions:   PRN Meds:.acetaminophen, dextrose 50%, dextrose 50%, diphenhydrAMINE-zinc acetate 1-0.1%, docusate sodium, glucagon (human recombinant), glucose, glucose, insulin aspart, morphine, sodium chloride 0.9%, traMADol      OBJECTIVE:     Vital Signs (Most Recent)  Temp: 98 °F (36.7 °C) (12/04/17 1149)  Pulse: 65 (12/04/17 1153)  Resp: 14 (12/04/17 1153)  BP: (!) 164/77 (12/04/17 1149)  SpO2: (!) 94 % (12/04/17 1153)    Vital Signs Range (Last 24H):  Temp:  [97.9 °F (36.6 °C)-99.6 °F (37.6 °C)]   Pulse:  [62-94]   Resp:  [14-20]   BP: (147-199)/(61-81)   SpO2:  [92 %-97 %]     Physical Exam:  Neck: normal carotid upstrokes; right carotid bruit; normal JVP  Lungs; clear  Heart: RR, normal S1,S2, long systolic murmur heard throughout loudest LLSB and apex consistent with MR; no TR heard  Abd: not examined  Exts: not  examined  But no edema noted     Laboratory:  LABS  CBC    Recent Labs  Lab 12/03/17 0315 12/04/17  0444   WBC 6.55 6.47   RBC 2.99* 2.96*   HGB 8.8* 8.9*   HCT 27.4* 27.4*    149*   MCV 92 93   MCH 29.4 30.1   MCHC 32.1 32.5     BMP    Recent Labs  Lab 12/03/17 0315 12/03/17  1310 12/04/17  0444   * 135* 135*   K 3.3* 3.3* 3.3*   CO2 25 29 27   CL 99 99 99   BUN 16 15 15   CREATININE 1.0 0.9 0.8    98 98         Recent Labs  Lab 12/03/17 0315 12/03/17  0542 12/03/17  1310 12/04/17  0444   CALCIUM 8.2*  --  8.2* 8.0*   MG  --   --   --  1.5*   PHOS  --  2.8  --  3.0       LFT    Recent Labs  Lab 12/03/17 0315 12/04/17  0444   PROT 6.7 6.0   ALBUMIN 2.8* 2.4*   BILITOT 0.7 0.9   AST 65* 79*   ALKPHOS 74 70   ALT 35 46*       COAGS    Recent Labs  Lab 12/03/17 0315 12/04/17  0444   INR 1.1 1.1   APTT 25.9 30.0     CE  No results for input(s): TROPONINI, CKTOTAL, CKMB in the last 168 hours.  BNP  No results for input(s): BNP in the last 168 hours.  Lipid panel:  Lab Results   Component Value Date    CHOL 145 12/26/2007    CHOL 151 10/27/2007    CHOL 157 06/18/2007     Lab Results   Component Value Date    HDL 47 12/26/2007    HDL 44 10/27/2007    HDL 48 06/18/2007     Lab Results   Component Value Date    LDLCALC 74.6 12/26/2007    LDLCALC 65.2 10/27/2007    LDLCALC 88.2 06/18/2007     Lab Results   Component Value Date    TRIG 117 12/26/2007    TRIG 209 (H) 10/27/2007    TRIG 104 06/18/2007     Lab Results   Component Value Date    CHOLHDL 32.4 12/26/2007    CHOLHDL 29.1 10/27/2007    CHOLHDL 30.6 06/18/2007     Diagnostic Results:  EKG: NSR, nonspecific T wave changes   CXR:s/p surgery; otherwise OK  Echo: normal EF, mitral annular calcification; moderate MR and mild to moderate TR, LAE   Chart review:  No cardiac information   ASSESSMENT/PLAN:     1. CAD with 2 vessel bypass and then stent over 7 years ago;   2. History of TIA and strokes in the past  3. Moderate MR and TR on echocardiogram  with normal EF     Plan: 1. Elderly female who gives no history of angina, no symptoms of failure and normal renal function  2. She is at moderate risk for any surgery but should tolerate this.would give K and continue all home medications   Brandee Leung MD

## 2017-12-04 NOTE — PROGRESS NOTES
Dr Olivia notified of blood pressure of 199/76. Orders to give morning medications of lisinopril and Metoprolol and hold amiodarone.  Chacked blood pressure in 2 hours and notify MD. MD also notified of foul smelling urine.

## 2017-12-04 NOTE — PLAN OF CARE
Problem: Patient Care Overview  Goal: Plan of Care Review  Outcome: Ongoing (interventions implemented as appropriate)  Pt AAOx4. NAD noted. Complaints of pain from movement  to right hip  With morphineand norco given with relief. Medication given per orders.  Sosa draining clear yellow urine.  POCT glucose checked. Pt with blood pressure of 182/82 with MD notified and  a one time dose of hydralazine given per orders. Mepliex in place and z-flex boots in place. Safety maintained. Pt assisted with turing. Will continue to monitor.

## 2017-12-04 NOTE — PROGRESS NOTES
Dr. Rela notified of blood pressure of 191/81. Pt complaining of pain. MD stated that Morphine can be given early due to pain.

## 2017-12-04 NOTE — PROGRESS NOTES
Progress Note  [facility]  Admit Date: 12/3/2017   LOS: 1 day   SUBJECTIVE:   Follow-up For: Right femur fracture. CAD    Patient seen and examined this AM. Reports right hip pain. Feels well otherwise.     Review of Systems     Denies CP, SOB, fever, chills.    OBJECTIVE:   Vital Signs (Most Recent)  Temp: 97.9 °F (36.6 °C) (12/04/17 0738)  Pulse: 66 (12/04/17 0756)  Resp: 14 (12/04/17 0756)  BP: (!) 199/76 (12/04/17 0738)  SpO2: (!) 93 % (12/04/17 0756)    I & O (Last 24H):  Intake/Output Summary (Last 24 hours) at 12/04/17 1046  Last data filed at 12/04/17 0616   Gross per 24 hour   Intake               50 ml   Output              975 ml   Net             -925 ml     Wt Readings from Last 3 Encounters:   12/03/17 81.2 kg (179 lb 0.2 oz)   09/27/17 81.2 kg (179 lb)   09/21/17 75.3 kg (166 lb 0.1 oz)       Current Diet Order   Procedures    Diet Cardiac        Physical Exam  General: AAOx3. NAD.  HEENT: NCAT. Moist mucous membranes   CV: RRR. NL S1/S2. No murmurs  Chest: clear to auscultation bilaterally, normal effort  Abd: +BS x 4. Soft. ND/NT. No rebound or guarding. : urinary cath in place  Ext: peripheral pulses intact. Right hip TTP  Neuro: No focal deficits  Psych: Good judgement and reason. No abnormal behaviors noted    Laboratory Data:  CBC    Recent Labs  Lab 12/03/17 0315 12/04/17  0444   WBC 6.55 6.47   RBC 2.99* 2.96*   HGB 8.8* 8.9*   HCT 27.4* 27.4*    149*   MCV 92 93   MCH 29.4 30.1   MCHC 32.1 32.5     CMP    Recent Labs  Lab 12/03/17  0315 12/03/17  1310 12/04/17  0444   CALCIUM 8.2* 8.2* 8.0*   PROT 6.7  --  6.0   * 135* 135*   K 3.3* 3.3* 3.3*   CO2 25 29 27   CL 99 99 99   BUN 16 15 15   CREATININE 1.0 0.9 0.8   ALKPHOS 74  --  70   ALT 35  --  46*   AST 65*  --  79*   BILITOT 0.7  --  0.9     POCT-Glucose  No results found for: POCTGLUCOSE  COAGS    Recent Labs  Lab 12/03/17  0315 12/04/17  0444   INR 1.1 1.1   APTT 25.9 30.0       ASSESSMENT/PLAN:   Alyssa LÓPEZ  Darling is a 87 y.o. female admitted for right femoral fracture awaiting cards clearance.     Closed displaced fracture of right femoral neck     - 2/2 mechanical fall   - Ortho consulted. Will take to OR after medical clearance. 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  Likely 2/2 to pain  - continue home medications- lisinopril 20 mg and metoprolol          History of CABG         -Per chart check hx of CABG x2          -2D echo on admission with EF 55%, moderate MVR and TVR         -Awaiting cards clearance prior to femur fracture repair         -Per chart check, on Lisinopril and Metoprolol. Pt not on Statin (listed as allergy)             Dispo: F/U cards recs    12/4/2017 Jdoi Rowan MD  10:46 AM

## 2017-12-04 NOTE — PROGRESS NOTES
Interval:   NAEO. Sleeping comfortably.    Vitals:  Temp:  [97.3 °F (36.3 °C)-99.6 °F (37.6 °C)] 98 °F (36.7 °C)  Pulse:  [63-94] 66  Resp:  [16-20] 18  SpO2:  [92 %-97 %] 92 %  BP: (147-194)/(61-82) 161/70    Scheduled Meds:    amiodarone  200 mg Oral BID    bimatoprost  1 drop Both Eyes QHS    clindamycin (CLEOCIN) IVPB  600 mg Intravenous Once Pre-Op    enoxaparin  40 mg Subcutaneous Daily    furosemide  20 mg Oral Daily    ipratropium  500 mcg Nebulization QID    levothyroxine  50 mcg Oral Before breakfast    lisinopril  20 mg Oral Daily    lisinopril  40 mg Oral Daily    metoprolol succinate  25 mg Oral Daily    olopatadine  1 drop Both Eyes BID    pantoprazole  40 mg Oral Daily    psyllium  1 packet Oral Daily    solifenacin  10 mg Oral Daily    sucralfate  1 g Oral QID     Continuous Infusions:    PRN Meds: acetaminophen, dextrose 50%, dextrose 50%, diphenhydrAMINE-zinc acetate 1-0.1%, docusate sodium, glucagon (human recombinant), glucose, glucose, insulin aspart, sodium chloride 0.9%, traMADol    Diet: Diet NPO    Trended Lab Data:    Recent Labs  Lab 12/03/17  0315 12/03/17  1310 12/04/17  0444   WBC 6.55  --  6.47   HGB 8.8*  --  8.9*   HCT 27.4*  --  27.4*     --  149*   MCV 92  --  93   RDW 16.0*  --  16.3*   * 135*  --    K 3.3* 3.3*  --    CL 99 99  --    CO2 25 29  --    BUN 16 15  --    CREATININE 1.0 0.9  --     98  --    PROT 6.7  --   --    ALBUMIN 2.8*  --   --    BILITOT 0.7  --   --    AST 65*  --   --    ALKPHOS 74  --   --    ALT 35  --   --        I/O last 3 completed shifts:  In: 200 [P.O.:200]  Out: 650 [Urine:650]    Exam:  Gen NAD  Resp unlabored  CV RR  RLE:  Skin intact  + hip tenderness, pain with any hip motion  EHL/FHL/TA/GS intact  SILT s/s/sp/dp/t  BCR    Impression/Plan:  Alyssa Hastings is a 87 y.o. female with a right femoral neck fracture  - bed rest, guadalupe until surgery  - will tentatively plan to take to OR today for right hip  hemiarthroplasty if medically cleared  - NPO        Kenton Crawford  PGY-3  [employer] [facility]

## 2017-12-04 NOTE — PLAN OF CARE
Problem: Patient Care Overview  Goal: Plan of Care Review  Outcome: Ongoing (interventions implemented as appropriate)  The proper method of use, as well as anticipated side effects, of this aerosol treatment are discussed and demonstrated to the patient. O2 saturation 94%on room air. Will continue to monitor.

## 2017-12-04 NOTE — ANESTHESIA PREPROCEDURE EVALUATION
12/03/2017  Alyssa Hastings is a 87 y.o., female right hip arthroplasty    Review of patient's allergies indicates:   Allergen Reactions    Actos [pioglitazone]     Amlodipine     Bentyl [dicyclomine]     Benzocaine     Declomycin [demeclocycline]     Doxycycline     Erythropoietin analogues     Gabapentin     Hydrocodone     Lipitor [atorvastatin]     Losartan     Metronidazole     Nortriptyline     Oxybutynin     Pcn [penicillins]     Potassium     Pravachol [pravastatin]     Procaine     Promethazine     Propranolol     Ramipril     Sulfa (sulfonamide antibiotics)      Past Medical History:   Diagnosis Date    Coronary artery disease     Diabetes mellitus     GERD (gastroesophageal reflux disease)     Glaucoma     Hypertension     Stroke     Thyroid disease      Past Surgical History:   Procedure Laterality Date    APPENDECTOMY      CARDIAC SURGERY      CHOLECYSTECTOMY      EYE SURGERY      HYSTERECTOMY       Patient Active Problem List   Diagnosis    Essential hypertension    Hypothyroidism    Macular degeneration of both eyes    Blindness of left eye    Allergic rhinitis due to allergen    Acute right-sided low back pain without sciatica    Constipation    Depression    Abnormal TSH    Nonexudative age-related macular degeneration, right eye, intermediate dry stage    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    Primary open-angle glaucoma, bilateral, moderate stage    Nasal drainage    Urge incontinence    CKD stage G3a/A1, 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 and albumin creatinine ratio <30 mg/g    Atherosclerosis of coronary artery    History of coronary artery bypass graft x 2    History of MI (myocardial infarction)    Abnormal liver enzymes    Optic atrophy    Status post cataract extraction and insertion of intraocular lens    Branch retinal vein occlusion     Hyponatremia    Normochromic normocytic anemia    Fatigue    Leg swelling    Dysuria    Liver mass    Closed displaced fracture of right femoral neck    Hypokalemia     Wt Readings from Last 3 Encounters:   12/03/17 81.2 kg (179 lb 0.2 oz)   09/27/17 81.2 kg (179 lb)   09/21/17 75.3 kg (166 lb 0.1 oz)     Temp Readings from Last 3 Encounters:   12/03/17 37.6 °C (99.6 °F) (Oral)   09/13/17 36.4 °C (97.6 °F) (Oral)   09/08/17 36.3 °C (97.4 °F) (Oral)     BP Readings from Last 3 Encounters:   12/03/17 (!) 176/72   11/08/17 (!) 120/50   10/11/17 132/60     Pulse Readings from Last 3 Encounters:   12/03/17 66   11/08/17 60   10/11/17 68         Anesthesia Evaluation    I have reviewed the Patient Summary Reports.        Review of Systems    Lab Results   Component Value Date    WBC 6.55 12/03/2017    HGB 8.8 (L) 12/03/2017    HCT 27.4 (L) 12/03/2017    MCV 92 12/03/2017     12/03/2017       Chemistry        Component Value Date/Time     (L) 12/03/2017 1310    K 3.3 (L) 12/03/2017 1310    CL 99 12/03/2017 1310    CO2 29 12/03/2017 1310    BUN 15 12/03/2017 1310    CREATININE 0.9 12/03/2017 1310    GLU 98 12/03/2017 1310        Component Value Date/Time    CALCIUM 8.2 (L) 12/03/2017 1310    ALKPHOS 74 12/03/2017 0315    AST 65 (H) 12/03/2017 0315    ALT 35 12/03/2017 0315    BILITOT 0.7 12/03/2017 0315    ESTGFRAFRICA >60 12/03/2017 1310    EGFRNONAA 58 (A) 12/03/2017 1310        Vent. Rate : 066 BPM     Atrial Rate : 066 BPM     P-R Int : 232 ms          QRS Dur : 096 ms      QT Int : 454 ms       P-R-T Axes : 050 -47 023 degrees     QTc Int : 475 ms    Sinus rhythm with 1st degree A-V block  Left axis deviation  Cannot rule out Anterior infarct (cited on or before 15-JUL-2017)  Abnormal ECG  When compared with ECG of 15-JUL-2017 15:07,  No significant change was found  Confirmed by Nicky ROACH, Christian DAMON ([id number]) on 9/5/2017 1:23:08 PM    Physical Exam  General:  Well nourished     Airway/Jaw/Neck:  Airway Findings: Mouth Opening: Normal Tongue: Normal  General Airway Assessment: Adult  Mallampati: II  Improves to II with phonation.  TM Distance: Normal, at least 6 cm       Chest/Lungs:  Chest/Lungs Findings: Clear to auscultation, Normal Respiratory Rate     Heart/Vascular:  Heart Findings: Rate: Normal  Rhythm: Regular Rhythm        Mental Status:  Mental Status Findings:  Cooperative, Alert and Oriented         Anesthesia Plan  Type of Anesthesia, risks & benefits discussed:  Anesthesia Type:  general  Patient's Preference:   Intra-op Monitoring Plan:   Intra-op Monitoring Plan Comments:   Post Op Pain Control Plan:   Post Op Pain Control Plan Comments:   Induction:   IV  Beta Blocker:  Patient is on a Beta-Blocker and has received one dose within the past 24 hours (No further documentation required).       Informed Consent: Patient understands risks and agrees with Anesthesia plan.  Questions answered. Anesthesia consent signed with patient.  ASA Score: 3     Day of Surgery Review of History & Physical:    H&P update referred to the surgeon.         Ready For Surgery From Anesthesia Perspective.

## 2017-12-04 NOTE — TELEPHONE ENCOUNTER
Patient is inpatient for hip replacement after a fall on Saturday.  Not sure of the timeline for healing.

## 2017-12-04 NOTE — PLAN OF CARE
Problem: Patient Care Overview  Goal: Plan of Care Review  Outcome: Outcome(s) achieved Date Met: 12/04/17  Patient is AAOx4. Care plan reviewed with patient. Complained of pain to right hip throughout the night. Ultram given with minimal relief. Sosa intact. Turned Q2. Cardiac monitoring initiated, SR with heart rate of 60s.

## 2017-12-04 NOTE — PROGRESS NOTES
made phone contact with long term care services and completed a level of care eligibility tool (LOCET) for possible nursing facility admission screening.       faxed level pasrr screen and determination for to the state for nursing facility admission approval.

## 2017-12-05 LAB
ALBUMIN SERPL BCP-MCNC: 2.4 G/DL
ALP SERPL-CCNC: 70 U/L
ALT SERPL W/O P-5'-P-CCNC: 51 U/L
ANION GAP SERPL CALC-SCNC: 5 MMOL/L
APTT BLDCRRT: 30.1 SEC
AST SERPL-CCNC: 79 U/L
BASOPHILS # BLD AUTO: 0.03 K/UL
BASOPHILS NFR BLD: 0.5 %
BILIRUB SERPL-MCNC: 0.8 MG/DL
BUN SERPL-MCNC: 17 MG/DL
CALCIUM SERPL-MCNC: 8.1 MG/DL
CHLORIDE SERPL-SCNC: 98 MMOL/L
CO2 SERPL-SCNC: 29 MMOL/L
CREAT SERPL-MCNC: 0.8 MG/DL
DIFFERENTIAL METHOD: ABNORMAL
EOSINOPHIL # BLD AUTO: 0.2 K/UL
EOSINOPHIL NFR BLD: 3.7 %
ERYTHROCYTE [DISTWIDTH] IN BLOOD BY AUTOMATED COUNT: 15.9 %
EST. GFR  (AFRICAN AMERICAN): >60 ML/MIN/1.73 M^2
EST. GFR  (NON AFRICAN AMERICAN): >60 ML/MIN/1.73 M^2
GLUCOSE SERPL-MCNC: 108 MG/DL
HCT VFR BLD AUTO: 26.3 %
HGB BLD-MCNC: 8.6 G/DL
INR PPP: 1.1
LYMPHOCYTES # BLD AUTO: 0.8 K/UL
LYMPHOCYTES NFR BLD: 12.7 %
MAGNESIUM SERPL-MCNC: 1.6 MG/DL
MCH RBC QN AUTO: 29.9 PG
MCHC RBC AUTO-ENTMCNC: 32.7 G/DL
MCV RBC AUTO: 91 FL
MONOCYTES # BLD AUTO: 0.9 K/UL
MONOCYTES NFR BLD: 13.1 %
NEUTROPHILS # BLD AUTO: 4.6 K/UL
NEUTROPHILS NFR BLD: 69.8 %
PHOSPHATE SERPL-MCNC: 2.9 MG/DL
PLATELET # BLD AUTO: 152 K/UL
PMV BLD AUTO: 9.8 FL
POCT GLUCOSE: 119 MG/DL (ref 70–110)
POCT GLUCOSE: 128 MG/DL (ref 70–110)
POCT GLUCOSE: 138 MG/DL (ref 70–110)
POTASSIUM SERPL-SCNC: 3.4 MMOL/L
PROT SERPL-MCNC: 6 G/DL
PROTHROMBIN TIME: 11.6 SEC
RBC # BLD AUTO: 2.88 M/UL
SODIUM SERPL-SCNC: 132 MMOL/L
WBC # BLD AUTO: 6.56 K/UL

## 2017-12-05 PROCEDURE — 94640 AIRWAY INHALATION TREATMENT: CPT

## 2017-12-05 PROCEDURE — 27201423 OPTIME MED/SURG SUP & DEVICES STERILE SUPPLY: Performed by: ORTHOPAEDIC SURGERY

## 2017-12-05 PROCEDURE — 85610 PROTHROMBIN TIME: CPT

## 2017-12-05 PROCEDURE — 63600175 PHARM REV CODE 636 W HCPCS: Performed by: FAMILY MEDICINE

## 2017-12-05 PROCEDURE — C1729 CATH, DRAINAGE: HCPCS | Performed by: ORTHOPAEDIC SURGERY

## 2017-12-05 PROCEDURE — 85730 THROMBOPLASTIN TIME PARTIAL: CPT

## 2017-12-05 PROCEDURE — 36000711: Performed by: ORTHOPAEDIC SURGERY

## 2017-12-05 PROCEDURE — 63600175 PHARM REV CODE 636 W HCPCS: Performed by: STUDENT IN AN ORGANIZED HEALTH CARE EDUCATION/TRAINING PROGRAM

## 2017-12-05 PROCEDURE — 25000003 PHARM REV CODE 250: Performed by: STUDENT IN AN ORGANIZED HEALTH CARE EDUCATION/TRAINING PROGRAM

## 2017-12-05 PROCEDURE — 37000009 HC ANESTHESIA EA ADD 15 MINS: Performed by: ORTHOPAEDIC SURGERY

## 2017-12-05 PROCEDURE — 25000003 PHARM REV CODE 250: Performed by: FAMILY MEDICINE

## 2017-12-05 PROCEDURE — 71000033 HC RECOVERY, INTIAL HOUR: Performed by: ORTHOPAEDIC SURGERY

## 2017-12-05 PROCEDURE — 99900035 HC TECH TIME PER 15 MIN (STAT)

## 2017-12-05 PROCEDURE — 25000003 PHARM REV CODE 250

## 2017-12-05 PROCEDURE — 86580 TB INTRADERMAL TEST: CPT | Performed by: FAMILY MEDICINE

## 2017-12-05 PROCEDURE — 25000003 PHARM REV CODE 250: Performed by: ORTHOPAEDIC SURGERY

## 2017-12-05 PROCEDURE — 85025 COMPLETE CBC W/AUTO DIFF WBC: CPT

## 2017-12-05 PROCEDURE — 25000003 PHARM REV CODE 250: Performed by: NURSE ANESTHETIST, CERTIFIED REGISTERED

## 2017-12-05 PROCEDURE — 71000039 HC RECOVERY, EACH ADD'L HOUR: Performed by: ORTHOPAEDIC SURGERY

## 2017-12-05 PROCEDURE — C1776 JOINT DEVICE (IMPLANTABLE): HCPCS | Performed by: ORTHOPAEDIC SURGERY

## 2017-12-05 PROCEDURE — 63600175 PHARM REV CODE 636 W HCPCS: Performed by: ANESTHESIOLOGY

## 2017-12-05 PROCEDURE — 36415 COLL VENOUS BLD VENIPUNCTURE: CPT

## 2017-12-05 PROCEDURE — 36000710: Performed by: ORTHOPAEDIC SURGERY

## 2017-12-05 PROCEDURE — 84100 ASSAY OF PHOSPHORUS: CPT

## 2017-12-05 PROCEDURE — 63600175 PHARM REV CODE 636 W HCPCS: Performed by: NURSE ANESTHETIST, CERTIFIED REGISTERED

## 2017-12-05 PROCEDURE — 80053 COMPREHEN METABOLIC PANEL: CPT

## 2017-12-05 PROCEDURE — 83735 ASSAY OF MAGNESIUM: CPT

## 2017-12-05 PROCEDURE — 37000008 HC ANESTHESIA 1ST 15 MINUTES: Performed by: ORTHOPAEDIC SURGERY

## 2017-12-05 PROCEDURE — 25000242 PHARM REV CODE 250 ALT 637 W/ HCPCS: Performed by: STUDENT IN AN ORGANIZED HEALTH CARE EDUCATION/TRAINING PROGRAM

## 2017-12-05 PROCEDURE — 94761 N-INVAS EAR/PLS OXIMETRY MLT: CPT

## 2017-12-05 PROCEDURE — 11000001 HC ACUTE MED/SURG PRIVATE ROOM

## 2017-12-05 PROCEDURE — 0SRR0JA REPLACEMENT OF RIGHT HIP JOINT, FEMORAL SURFACE WITH SYNTHETIC SUBSTITUTE, UNCEMENTED, OPEN APPROACH: ICD-10-PCS | Performed by: ORTHOPAEDIC SURGERY

## 2017-12-05 DEVICE — STEM FEM HIP ACCOLADE #4 132 D: Type: IMPLANTABLE DEVICE | Site: HIP | Status: FUNCTIONAL

## 2017-12-05 DEVICE — HEAD FEM ENDO UNI MOD 45MM: Type: IMPLANTABLE DEVICE | Site: HIP | Status: FUNCTIONAL

## 2017-12-05 DEVICE — SLEEVE COMP UNITRAX STND: Type: IMPLANTABLE DEVICE | Site: HIP | Status: FUNCTIONAL

## 2017-12-05 RX ORDER — POTASSIUM CHLORIDE 20 MEQ/15ML
40 SOLUTION ORAL ONCE
Status: COMPLETED | OUTPATIENT
Start: 2017-12-05 | End: 2017-12-05

## 2017-12-05 RX ORDER — ONDANSETRON 2 MG/ML
4 INJECTION INTRAMUSCULAR; INTRAVENOUS DAILY PRN
Status: DISCONTINUED | OUTPATIENT
Start: 2017-12-05 | End: 2017-12-05

## 2017-12-05 RX ORDER — CEFAZOLIN SODIUM 1 G/3ML
INJECTION, POWDER, FOR SOLUTION INTRAMUSCULAR; INTRAVENOUS
Status: DISCONTINUED | OUTPATIENT
Start: 2017-12-05 | End: 2017-12-05

## 2017-12-05 RX ORDER — LIDOCAINE HCL/PF 100 MG/5ML
SYRINGE (ML) INTRAVENOUS
Status: DISCONTINUED | OUTPATIENT
Start: 2017-12-05 | End: 2017-12-05

## 2017-12-05 RX ORDER — HYDROCODONE BITARTRATE AND ACETAMINOPHEN 10; 325 MG/1; MG/1
1 TABLET ORAL EVERY 4 HOURS PRN
Status: DISCONTINUED | OUTPATIENT
Start: 2017-12-05 | End: 2017-12-12 | Stop reason: HOSPADM

## 2017-12-05 RX ORDER — BUPIVACAINE HYDROCHLORIDE 2.5 MG/ML
INJECTION, SOLUTION EPIDURAL; INFILTRATION; INTRACAUDAL
Status: DISCONTINUED | OUTPATIENT
Start: 2017-12-05 | End: 2017-12-05 | Stop reason: HOSPADM

## 2017-12-05 RX ORDER — ACETAMINOPHEN 10 MG/ML
INJECTION, SOLUTION INTRAVENOUS
Status: DISCONTINUED | OUTPATIENT
Start: 2017-12-05 | End: 2017-12-05

## 2017-12-05 RX ORDER — SUCCINYLCHOLINE CHLORIDE 20 MG/ML
INJECTION INTRAMUSCULAR; INTRAVENOUS
Status: DISCONTINUED | OUTPATIENT
Start: 2017-12-05 | End: 2017-12-05

## 2017-12-05 RX ORDER — ACETAMINOPHEN 325 MG/1
650 TABLET ORAL EVERY 4 HOURS PRN
Status: DISCONTINUED | OUTPATIENT
Start: 2017-12-05 | End: 2017-12-12 | Stop reason: HOSPADM

## 2017-12-05 RX ORDER — HYDRALAZINE HYDROCHLORIDE 20 MG/ML
10 INJECTION INTRAMUSCULAR; INTRAVENOUS EVERY 6 HOURS PRN
Status: DISCONTINUED | OUTPATIENT
Start: 2017-12-05 | End: 2017-12-12

## 2017-12-05 RX ORDER — SODIUM CHLORIDE 9 MG/ML
INJECTION, SOLUTION INTRAVENOUS CONTINUOUS PRN
Status: DISCONTINUED | OUTPATIENT
Start: 2017-12-05 | End: 2017-12-05

## 2017-12-05 RX ORDER — MORPHINE SULFATE 2 MG/ML
2 INJECTION, SOLUTION INTRAMUSCULAR; INTRAVENOUS EVERY 5 MIN PRN
Status: DISCONTINUED | OUTPATIENT
Start: 2017-12-05 | End: 2017-12-05

## 2017-12-05 RX ORDER — OXYCODONE AND ACETAMINOPHEN 5; 325 MG/1; MG/1
1 TABLET ORAL
Status: DISCONTINUED | OUTPATIENT
Start: 2017-12-05 | End: 2017-12-05

## 2017-12-05 RX ORDER — FENTANYL CITRATE 50 UG/ML
INJECTION, SOLUTION INTRAMUSCULAR; INTRAVENOUS
Status: DISCONTINUED | OUTPATIENT
Start: 2017-12-05 | End: 2017-12-05

## 2017-12-05 RX ORDER — ONDANSETRON 2 MG/ML
INJECTION INTRAMUSCULAR; INTRAVENOUS
Status: DISCONTINUED | OUTPATIENT
Start: 2017-12-05 | End: 2017-12-05

## 2017-12-05 RX ORDER — PROPOFOL 10 MG/ML
INJECTION, EMULSION INTRAVENOUS
Status: DISCONTINUED | OUTPATIENT
Start: 2017-12-05 | End: 2017-12-05

## 2017-12-05 RX ORDER — IPRATROPIUM BROMIDE 0.5 MG/2.5ML
500 SOLUTION RESPIRATORY (INHALATION) EVERY 4 HOURS PRN
Status: DISCONTINUED | OUTPATIENT
Start: 2017-12-05 | End: 2017-12-12 | Stop reason: HOSPADM

## 2017-12-05 RX ORDER — HYDROCODONE BITARTRATE AND ACETAMINOPHEN 5; 325 MG/1; MG/1
1 TABLET ORAL EVERY 4 HOURS PRN
Status: DISCONTINUED | OUTPATIENT
Start: 2017-12-05 | End: 2017-12-12 | Stop reason: HOSPADM

## 2017-12-05 RX ORDER — SODIUM CHLORIDE 0.9 % (FLUSH) 0.9 %
5 SYRINGE (ML) INJECTION
Status: DISCONTINUED | OUTPATIENT
Start: 2017-12-05 | End: 2017-12-12 | Stop reason: HOSPADM

## 2017-12-05 RX ORDER — ONDANSETRON 8 MG/1
8 TABLET, ORALLY DISINTEGRATING ORAL EVERY 8 HOURS PRN
Status: DISCONTINUED | OUTPATIENT
Start: 2017-12-05 | End: 2017-12-12 | Stop reason: HOSPADM

## 2017-12-05 RX ORDER — MUPIROCIN 20 MG/G
1 OINTMENT TOPICAL 2 TIMES DAILY
Status: DISPENSED | OUTPATIENT
Start: 2017-12-05 | End: 2017-12-10

## 2017-12-05 RX ADMIN — OLOPATADINE HYDROCHLORIDE 1 DROP: 1 SOLUTION/ DROPS OPHTHALMIC at 09:12

## 2017-12-05 RX ADMIN — SUCRALFATE 1 G: 1 TABLET ORAL at 06:12

## 2017-12-05 RX ADMIN — TRAMADOL HYDROCHLORIDE 50 MG: 50 TABLET, COATED ORAL at 05:12

## 2017-12-05 RX ADMIN — FUROSEMIDE 20 MG: 20 TABLET ORAL at 12:12

## 2017-12-05 RX ADMIN — MORPHINE SULFATE 1 MG: 10 INJECTION INTRAVENOUS at 11:12

## 2017-12-05 RX ADMIN — SUCRALFATE 1 G: 1 TABLET ORAL at 05:12

## 2017-12-05 RX ADMIN — MORPHINE SULFATE 2 MG: 2 INJECTION, SOLUTION INTRAMUSCULAR; INTRAVENOUS at 05:12

## 2017-12-05 RX ADMIN — MUPIROCIN 1 G: 20 OINTMENT TOPICAL at 09:12

## 2017-12-05 RX ADMIN — Medication: at 11:12

## 2017-12-05 RX ADMIN — EPHEDRINE SULFATE 5 MG: 50 INJECTION, SOLUTION INTRAMUSCULAR; INTRAVENOUS; SUBCUTANEOUS at 02:12

## 2017-12-05 RX ADMIN — ACETAMINOPHEN 650 MG: 325 TABLET ORAL at 02:12

## 2017-12-05 RX ADMIN — EPHEDRINE SULFATE 10 MG: 50 INJECTION, SOLUTION INTRAMUSCULAR; INTRAVENOUS; SUBCUTANEOUS at 02:12

## 2017-12-05 RX ADMIN — IPRATROPIUM BROMIDE 500 MCG: 0.5 SOLUTION RESPIRATORY (INHALATION) at 07:12

## 2017-12-05 RX ADMIN — TUBERCULIN PURIFIED PROTEIN DERIVATIVE 5 UNITS: 5 INJECTION INTRADERMAL at 09:12

## 2017-12-05 RX ADMIN — MORPHINE SULFATE 1 MG: 10 INJECTION INTRAVENOUS at 09:12

## 2017-12-05 RX ADMIN — PROPOFOL 60 MG: 10 INJECTION, EMULSION INTRAVENOUS at 01:12

## 2017-12-05 RX ADMIN — LIDOCAINE HYDROCHLORIDE 80 MG: 20 INJECTION, SOLUTION INTRAVENOUS at 01:12

## 2017-12-05 RX ADMIN — TRANEXAMIC ACID 1 G: 100 INJECTION, SOLUTION INTRAVENOUS at 03:12

## 2017-12-05 RX ADMIN — PROPOFOL 30 MG: 10 INJECTION, EMULSION INTRAVENOUS at 02:12

## 2017-12-05 RX ADMIN — ACETAMINOPHEN 1000 MG: 10 INJECTION, SOLUTION INTRAVENOUS at 02:12

## 2017-12-05 RX ADMIN — HYDRALAZINE HYDROCHLORIDE 10 MG: 20 INJECTION INTRAMUSCULAR; INTRAVENOUS at 09:12

## 2017-12-05 RX ADMIN — ONDANSETRON 4 MG: 2 INJECTION, SOLUTION INTRAMUSCULAR; INTRAVENOUS at 03:12

## 2017-12-05 RX ADMIN — FENTANYL CITRATE 50 MCG: 50 INJECTION, SOLUTION INTRAMUSCULAR; INTRAVENOUS at 02:12

## 2017-12-05 RX ADMIN — SUCCINYLCHOLINE CHLORIDE 120 MG: 20 INJECTION, SOLUTION INTRAMUSCULAR; INTRAVENOUS at 01:12

## 2017-12-05 RX ADMIN — ENOXAPARIN SODIUM 40 MG: 100 INJECTION SUBCUTANEOUS at 06:12

## 2017-12-05 RX ADMIN — BIMATOPROST 1 DROP: 0.1 SOLUTION/ DROPS OPHTHALMIC at 09:12

## 2017-12-05 RX ADMIN — AMIODARONE HYDROCHLORIDE 200 MG: 200 TABLET ORAL at 09:12

## 2017-12-05 RX ADMIN — METOPROLOL SUCCINATE 25 MG: 25 TABLET, EXTENDED RELEASE ORAL at 12:12

## 2017-12-05 RX ADMIN — SUCRALFATE 1 G: 1 TABLET ORAL at 11:12

## 2017-12-05 RX ADMIN — LEVOTHYROXINE SODIUM 50 MCG: 50 TABLET ORAL at 05:12

## 2017-12-05 RX ADMIN — POTASSIUM CHLORIDE 40 MEQ: 20 SOLUTION ORAL at 06:12

## 2017-12-05 RX ADMIN — CEFAZOLIN 2 G: 330 INJECTION, POWDER, FOR SOLUTION INTRAMUSCULAR; INTRAVENOUS at 01:12

## 2017-12-05 RX ADMIN — MORPHINE SULFATE 2 MG: 2 INJECTION, SOLUTION INTRAMUSCULAR; INTRAVENOUS at 04:12

## 2017-12-05 RX ADMIN — FENTANYL CITRATE 100 MCG: 50 INJECTION, SOLUTION INTRAMUSCULAR; INTRAVENOUS at 01:12

## 2017-12-05 RX ADMIN — LISINOPRIL 40 MG: 20 TABLET ORAL at 12:12

## 2017-12-05 RX ADMIN — SODIUM CHLORIDE: 0.9 INJECTION, SOLUTION INTRAVENOUS at 01:12

## 2017-12-05 NOTE — INTERVAL H&P NOTE
The patient has been examined and the H&P has been reviewed:    I concur with the findings and changes have been noted since the H&P was written: Cleared for surgery    Anesthesia/Surgery risks, benefits and alternative options discussed and understood by patient/family.          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

## 2017-12-05 NOTE — PROGRESS NOTES
Progress Note  [facility]  Admit Date: 12/3/2017   LOS: 2 days   SUBJECTIVE:   Follow-up For: right femur fracture    Patient seen and examined this AM. Right femur surgery scheduled for today. Patient reports anxiety regarding surgery.     Review of Systems     Denies SOB, f/c, N/V    OBJECTIVE:   Vital Signs (Most Recent)  Temp: 97.4 °F (36.3 °C) (12/05/17 0818)  Pulse: 67 (12/05/17 1026)  Resp: 20 (12/05/17 0818)  BP: (!) 166/71 (12/05/17 1026)  SpO2: 96 % (12/05/17 0712)    I & O (Last 24H):  Intake/Output Summary (Last 24 hours) at 12/05/17 1106  Last data filed at 12/05/17 0635   Gross per 24 hour   Intake              100 ml   Output             1100 ml   Net            -1000 ml     Wt Readings from Last 3 Encounters:   12/03/17 81.2 kg (179 lb 0.2 oz)   09/27/17 81.2 kg (179 lb)   09/21/17 75.3 kg (166 lb 0.1 oz)       Current Diet Order   Procedures    Diet NPO        Physical Exam  General: AAOx3. NAD.  HEENT: NCAT. Moist mucous membranes   CV: RRR. NL S1/S2. No murmurs  Chest: CTA bilaterally, normal effort  Abd: +BS x 4. Soft. ND/NT. : urinary cath in place  Ext: peripheral pulses intact. Right hip TTP  Neuro: No focal deficits  Psych: Good judgement and reason. No abnormal behaviors noted    Laboratory Data:  CBC    Recent Labs  Lab 12/04/17  0444 12/04/17  1507 12/05/17  0430   WBC 6.47 7.58 6.56   RBC 2.96* 3.05* 2.88*   HGB 8.9* 9.2* 8.6*   HCT 27.4* 28.3* 26.3*   * 160 152   MCV 93 93 91   MCH 30.1 30.2 29.9   MCHC 32.5 32.5 32.7     CMP    Recent Labs  Lab 12/03/17  0315 12/03/17  1310 12/04/17  0444 12/05/17  0430   CALCIUM 8.2* 8.2* 8.0* 8.1*   PROT 6.7  --  6.0 6.0   * 135* 135* 132*   K 3.3* 3.3* 3.3* 3.4*   CO2 25 29 27 29   CL 99 99 99 98   BUN 16 15 15 17   CREATININE 1.0 0.9 0.8 0.8   ALKPHOS 74  --  70 70   ALT 35  --  46* 51*   AST 65*  --  79* 79*   BILITOT 0.7  --  0.9 0.8     POCT-Glucose  POCT Glucose   Date Value Ref Range Status   12/05/2017 119 (H) 70 - 110  mg/dL Final   12/04/2017 119 (H) 70 - 110 mg/dL Final   12/04/2017 129 (H) 70 - 110 mg/dL Final   12/04/2017 121 (H) 70 - 110 mg/dL Final   12/04/2017 107 70 - 110 mg/dL Final   12/03/2017 108 70 - 110 mg/dL Final   12/03/2017 113 (H) 70 - 110 mg/dL Final   12/03/2017 117 (H) 70 - 110 mg/dL Final   12/03/2017 134 (H) 70 - 110 mg/dL Final     COAGS    Recent Labs  Lab 12/03/17  0315 12/04/17  0444 12/05/17  0430   INR 1.1 1.1 1.1   APTT 25.9 30.0 30.1       ASSESSMENT/PLAN:   Alyssa Hastings is a 87 y.o. female female admitted for right femoral fracture,surgery scheduled with ortho today.         Closed displaced fracture of right femoral neck     - 2/2 mechanical fall   - Ortho consulted. Will take to OR today  - Morphine PRN and Tramadol 50 mg           Hypokalemia     - K of 3.4  - will replete and continue to monitor           Hyponatremia     - Na 132  - Will continue to monitor            CKD stage G3a/A1, GFR 45-59 and albumin creatinine ratio <30 mg/g     -eGFR of 51 on admit, >60 today  -BUN 17/0.8 stable  - will hold nephrotoxic meds          Hypothyroidism     - continue synthroid 50 mcg          Essential hypertension     - acutely elevated in Ed.  Likely 2/2 to pain  - continue home medications: lisinopril 20 mg and metoprolol         History of CABG         -Per chart check hx of CABG x2          -2D echo on admission with EF 55%, moderate MVR and TVR         -Cards cleared patient for femur fracture repair               Dispo: F/U post op, Monitor electrolytes      12/5/2017 Jodi Rowan MD  11:06 AM

## 2017-12-05 NOTE — PROGRESS NOTES
Interval:   NAEO. Was inadvertently given lunch yesterday so surgery delayed until today.     Vitals:  Temp:  [96.3 °F (35.7 °C)-98.3 °F (36.8 °C)] 96.6 °F (35.9 °C)  Pulse:  [62-69] 64  Resp:  [14-22] 19  SpO2:  [93 %-97 %] 95 %  BP: (158-199)/(68-81) 163/71    Scheduled Meds:    amiodarone  200 mg Oral BID    bimatoprost  1 drop Both Eyes QHS    enoxaparin  40 mg Subcutaneous Daily    furosemide  20 mg Oral Daily    ipratropium  500 mcg Nebulization QID    levothyroxine  50 mcg Oral Before breakfast    lisinopril  40 mg Oral Daily    metoprolol succinate  25 mg Oral Daily    olopatadine  1 drop Both Eyes BID    pantoprazole  40 mg Oral Daily    psyllium  1 packet Oral Daily    solifenacin  10 mg Oral Daily    sucralfate  1 g Oral QID     Continuous Infusions:    PRN Meds: acetaminophen, dextrose 50%, dextrose 50%, diphenhydrAMINE-zinc acetate 1-0.1%, docusate sodium, glucagon (human recombinant), glucose, glucose, hydrALAZINE, insulin aspart, morphine, sodium chloride 0.9%, traMADol    Diet: Diet NPO    Trended Lab Data:    Recent Labs  Lab 12/03/17  0315 12/03/17  1310 12/04/17  0444 12/04/17  1507 12/05/17  0430   WBC 6.55  --  6.47 7.58 6.56   HGB 8.8*  --  8.9* 9.2* 8.6*   HCT 27.4*  --  27.4* 28.3* 26.3*     --  149* 160 152   MCV 92  --  93 93 91   RDW 16.0*  --  16.3* 16.1* 15.9*   * 135* 135*  --  132*   K 3.3* 3.3* 3.3*  --  3.4*   CL 99 99 99  --  98   CO2 25 29 27  --  29   BUN 16 15 15  --  17   CREATININE 1.0 0.9 0.8  --  0.8    98 98  --  108   PROT 6.7  --  6.0  --  6.0   ALBUMIN 2.8*  --  2.4*  --  2.4*   BILITOT 0.7  --  0.9  --  0.8   AST 65*  --  79*  --  79*   ALKPHOS 74  --  70  --  70   ALT 35  --  46*  --  51*       I/O last 3 completed shifts:  In: 50 [P.O.:50]  Out: 1525 [Urine:1525]    Exam:  Gen NAD  Resp unlabored  CV RR  RLE:  Skin intact  + hip tenderness, pain with any hip motion  EHL/FHL/TA/GS intact  SILT  s/s/sp/dp/t  BCR    Impression/Plan:  Alyssa Hastings is a 87 y.o. female with a right femoral neck fracture  - bed rest, guadalupe until surgery  - To OR today for right hip hemiarthroplasty  - NPO        Kenton Crawford  PGY-3  [employer] Orthopaedic Surgery

## 2017-12-05 NOTE — BRIEF OP NOTE
[facility]  Brief Operative Note    SUMMARY     Surgery Date: 12/5/2017     Surgeon(s) and Role:     * Juan Simpson MD - Primary     * Alexy Abdi MD - Resident - Assisting        Pre-op Diagnosis:  Closed displaced fracture of right femoral neck [S72.001A]    Post-op Diagnosis:  Post-Op Diagnosis Codes:     * Closed displaced fracture of right femoral neck [S72.001A]    Procedure(s) (LRB):  HEMIARTHROPLASTY - RIGHT HIP (Right)    Anesthesia: General    Description of Procedure: right hip hemiarthroplasty    Description of the findings of the procedure: see op note    Estimated Blood Loss: 200 mL         Specimens:   Specimen (12h ago through future)    None

## 2017-12-05 NOTE — PLAN OF CARE
Problem: Patient Care Overview  Goal: Plan of Care Review  Outcome: Ongoing (interventions implemented as appropriate)  Pt AAOx4. NAD noted. Complaints of pain from movement  to right hip  With morphine and  given with relief. Medication given per orders.  Pt NPO at midnight for surgery tomorrow. Anesthesia and surgery consent signed and in chart. Guadalupe draining clear yellow urine. guadalupe changed per Md order today due to leaking.  POCT glucose checked. Pt with blood pressure of 192/82 with MD with hydralazine given.  Mepliex in place and z-flex boots in place. Safety maintained. Pt assisted with turing. Will continue to monitor.

## 2017-12-05 NOTE — TRANSFER OF CARE
"Anesthesia Transfer of Care Note    Patient: Alyssa Hastings    Procedure(s) Performed: Procedure(s) (LRB):  HEMIARTHROPLASTY - RIGHT HIP (Right)    Patient location: PACU    Anesthesia Type: general    Transport from OR: Transported from OR on 100% O2 by closed face mask with adequate spontaneous ventilation    Post pain: adequate analgesia    Post assessment: no apparent anesthetic complications    Post vital signs: stable    Level of consciousness: awake and alert    Nausea/Vomiting: no nausea/vomiting    Complications: none    Transfer of care protocol was followed      Last vitals:   Visit Vitals  BP (!) 166/70   Pulse 64   Temp 36.4 °C (97.5 °F) (Temporal)   Resp 14   Ht 5' 5" (1.651 m)   Wt 81.2 kg (179 lb 0.2 oz)   SpO2 (!) 93%   Breastfeeding? No   BMI 29.79 kg/m²     "

## 2017-12-05 NOTE — PLAN OF CARE
Pt arrived to PACU with SCOTT Aguilar RN and MUSTAPHA Moreno. Pt sleeping but arouses to voice. vss (see flow sheet) respirations even and unlabored. Dressing to RLE c/d/i. Redness noted to bilateral eyelids. Anesthesia notified. No signs of discomfort noted. Pt's family updated.

## 2017-12-05 NOTE — PROGRESS NOTES
To OR today for right hip hemiarthroplasty, TN to follow up on PT /OT reccs, evaluation pending.      Future Appointments  Date Time Provider Department Center   12/7/2017 1:00 PM Abby Lerma MD [facility]   12/12/2017 9:00 AM HEARING AIDS, SANGITA [facility] HEAR Robert Taylor   1/10/2018 2:30 PM Isaias Elizabeth MD [facility] OPHTHAL Robert Tayolr

## 2017-12-05 NOTE — PLAN OF CARE
Problem: Patient Care Overview  Goal: Plan of Care Review  Outcome: Ongoing (interventions implemented as appropriate)  Patient is AAOx3, able to answer appropriately, but is confused at times especially at night. NPO except meds since midnight for surgery this morning. Hibiclens bath completed, new gown on, scds on, dentures removed. Patient complained of pain to right hip throughout the night. PRN tramadol and tylenol given with relief. Sosa intact. Patient's son Luis was notified of surgery for this morning.

## 2017-12-05 NOTE — OP NOTE
Orthopedic Surgery Operative Note     Attending:  Dr. Simpson     Date of Procedure:  12/5/2017     Pre-op Diagnosis:   Right hip femoral neck fracture, displaced     Post-op Diagnosis:  Same     Procedure:  Right hip hemiarthroplasty     Implant:  Ledbetter accolade II, 132*, size 4 stem  45mm endoprosthesis head  Standard neck     Anesthesia:  Spinal and regional block     Surgeon:  Dr. Tatiana Abdi     Estimated Blood Loss:  200cc     Indications:  87 y.o.female with a right displaced femoral neck fracture. The risks, benefits, and alternatives to surgery were discussed in detail in the hospital with the patient and family who agreed to proceed. The risks and potential complications of their problem and purposed treatment include but are not limited to infection, nerve injury, vascular injury, leg length discrepancy, persistent pain, potential skin necrosis, deep vein thrombosis, possible pulmonary embolus, complications of the anesthetics and failure of the implant.  The patient concurred with the proposed plan, giving informed consent. The site of surgery properly noted/marked. Medicine cleared them for surgery.             Procedure in Detail:  After risks, benefits, and alternatives of the procedure were discussed in detail, informed consent was obtained from the patient and family. The patient was taken to the operating room, administered spinal/regional anesthesia and placed in the lateral position. The RLE was prepped and draped in the usual sterile fashion. A time out was performed: confirmation of the correct patient, operative side, operative site, site natalie, allergies and the appropriate antibiotics were administered.      A standard posterolateral style hip incision was carried down to the deep fascia. The short rotators were elevated with the capsule in a single sleeve. The neck was re-cut approx 1-2 finger breadths above the lesser troch and ring of neck/cortex removed. The acetabulum was then  exposed with retractors anteriorly and inferiorly. Attached tissue around the femoral head was transected.. The femoral head was then easily removed using a corkscrew and acosta elevator. The acetabulum appeared in good condition.  The acetabulum was sized at 45 using the various trial femoral heads and the size of the patients femoral head (44mm). With a 45 mm ball, we had excellent fit and suction able to lift pelvis off the table.     We next exposed the proximal femur using a femoral neck elevators and isolated the neck osteotomy. We removed soft tissue blocking the proximal femur metaphysis. The femur was opened with a  and canal finder.  We broached and trialed up to the Accolade 5 stem following the native version. The broach was stable and flouro used to confirm fit, fill, and position. With the 5 in place and a -4 head we felt her hip was too tight and on fluoroscopy the hip appeared lengthened. We also noticed our neck cut was a little high so we removed the 5 and put a 4 stem in place. We then calcar reamed to the stem. With the 4 broach and a standard head the hip was stable and flouro used to confirm fit, fill, and position.We assess stablity with with the hip flexed to 90 degrees, we were able to internaly rotate past 45 degrees before any evidence of subluxation. With this stability, the trial components were removed.       We then pressfit in a Accolade 4 stem.  A unipolar size 45 +0 head was placed on the trunion.  The hip was then reduced and taken through a range of motion and found to be stable without impingement. The wound was instilled with a dilute betadine solution and then copiously irrigated with saline. The wound was then closed in layers. The skin was reapposed with a subcutaneous layer, dermabond, and steristrips. A dressing was applied.      Instrument, sponge, and needle counts were correct prior to wound closure and at the conclusion of the case.      The patient was awoken  from anesthesia, tolerated the procedure well and taken to recovery in stable condition.     Complications:  None     Drains:  None     Pathology/Specimens:  Femoral Head        Dr. Simpson was present for all key portions of the procedure.

## 2017-12-05 NOTE — PROGRESS NOTES
Pt c/o pressure to right side chest, radiating to left side of chest. /71, HR 67. satting 96% on RA. Alfonzo's team notified.

## 2017-12-05 NOTE — PLAN OF CARE
Problem: Patient Care Overview  Goal: Plan of Care Review  Outcome: Ongoing (interventions implemented as appropriate)  The proper method of use, as well as anticipated side effects, of this aerosol treatment are discussed and demonstrated to the patient. Pt on RA with sats of 96%. Will continue to monitor.

## 2017-12-06 LAB
ALBUMIN SERPL BCP-MCNC: 2.4 G/DL
ALP SERPL-CCNC: 73 U/L
ALT SERPL W/O P-5'-P-CCNC: 42 U/L
ANION GAP SERPL CALC-SCNC: 9 MMOL/L
APTT BLDCRRT: 30 SEC
AST SERPL-CCNC: 71 U/L
BASOPHILS # BLD AUTO: 0.02 K/UL
BASOPHILS NFR BLD: 0.2 %
BILIRUB SERPL-MCNC: 0.8 MG/DL
BUN SERPL-MCNC: 20 MG/DL
CALCIUM SERPL-MCNC: 8.2 MG/DL
CHLORIDE SERPL-SCNC: 100 MMOL/L
CO2 SERPL-SCNC: 25 MMOL/L
CREAT SERPL-MCNC: 1 MG/DL
DIFFERENTIAL METHOD: ABNORMAL
EOSINOPHIL # BLD AUTO: 0.1 K/UL
EOSINOPHIL NFR BLD: 1 %
ERYTHROCYTE [DISTWIDTH] IN BLOOD BY AUTOMATED COUNT: 16.1 %
EST. GFR  (AFRICAN AMERICAN): 59 ML/MIN/1.73 M^2
EST. GFR  (NON AFRICAN AMERICAN): 51 ML/MIN/1.73 M^2
GLUCOSE SERPL-MCNC: 119 MG/DL
HCT VFR BLD AUTO: 26.7 %
HGB BLD-MCNC: 8.4 G/DL
INR PPP: 1.1
LYMPHOCYTES # BLD AUTO: 0.6 K/UL
LYMPHOCYTES NFR BLD: 6 %
MAGNESIUM SERPL-MCNC: 1.5 MG/DL
MCH RBC QN AUTO: 29.5 PG
MCHC RBC AUTO-ENTMCNC: 31.5 G/DL
MCV RBC AUTO: 94 FL
MONOCYTES # BLD AUTO: 1.2 K/UL
MONOCYTES NFR BLD: 12 %
NEUTROPHILS # BLD AUTO: 8.3 K/UL
NEUTROPHILS NFR BLD: 80.5 %
PHOSPHATE SERPL-MCNC: 3.6 MG/DL
PLATELET # BLD AUTO: 179 K/UL
PMV BLD AUTO: 9.9 FL
POCT GLUCOSE: 129 MG/DL (ref 70–110)
POCT GLUCOSE: 138 MG/DL (ref 70–110)
POCT GLUCOSE: 153 MG/DL (ref 70–110)
POTASSIUM SERPL-SCNC: 3.9 MMOL/L
PROT SERPL-MCNC: 6 G/DL
PROTHROMBIN TIME: 11.4 SEC
RBC # BLD AUTO: 2.85 M/UL
SODIUM SERPL-SCNC: 134 MMOL/L
WBC # BLD AUTO: 10.29 K/UL

## 2017-12-06 PROCEDURE — 97165 OT EVAL LOW COMPLEX 30 MIN: CPT

## 2017-12-06 PROCEDURE — G8987 SELF CARE CURRENT STATUS: HCPCS | Mod: CL

## 2017-12-06 PROCEDURE — 27000221 HC OXYGEN, UP TO 24 HOURS

## 2017-12-06 PROCEDURE — 25000003 PHARM REV CODE 250: Performed by: FAMILY MEDICINE

## 2017-12-06 PROCEDURE — 25000003 PHARM REV CODE 250: Performed by: ORTHOPAEDIC SURGERY

## 2017-12-06 PROCEDURE — 63600175 PHARM REV CODE 636 W HCPCS: Performed by: FAMILY MEDICINE

## 2017-12-06 PROCEDURE — 36415 COLL VENOUS BLD VENIPUNCTURE: CPT

## 2017-12-06 PROCEDURE — 25000003 PHARM REV CODE 250

## 2017-12-06 PROCEDURE — G8988 SELF CARE GOAL STATUS: HCPCS | Mod: CJ

## 2017-12-06 PROCEDURE — 94799 UNLISTED PULMONARY SVC/PX: CPT

## 2017-12-06 PROCEDURE — 97530 THERAPEUTIC ACTIVITIES: CPT

## 2017-12-06 PROCEDURE — 97161 PT EVAL LOW COMPLEX 20 MIN: CPT

## 2017-12-06 PROCEDURE — 83735 ASSAY OF MAGNESIUM: CPT

## 2017-12-06 PROCEDURE — 97110 THERAPEUTIC EXERCISES: CPT

## 2017-12-06 PROCEDURE — G8978 MOBILITY CURRENT STATUS: HCPCS | Mod: CL

## 2017-12-06 PROCEDURE — 85610 PROTHROMBIN TIME: CPT

## 2017-12-06 PROCEDURE — 63600175 PHARM REV CODE 636 W HCPCS: Performed by: STUDENT IN AN ORGANIZED HEALTH CARE EDUCATION/TRAINING PROGRAM

## 2017-12-06 PROCEDURE — 94761 N-INVAS EAR/PLS OXIMETRY MLT: CPT

## 2017-12-06 PROCEDURE — 25000242 PHARM REV CODE 250 ALT 637 W/ HCPCS: Performed by: FAMILY MEDICINE

## 2017-12-06 PROCEDURE — 99900035 HC TECH TIME PER 15 MIN (STAT)

## 2017-12-06 PROCEDURE — 25000003 PHARM REV CODE 250: Performed by: STUDENT IN AN ORGANIZED HEALTH CARE EDUCATION/TRAINING PROGRAM

## 2017-12-06 PROCEDURE — G8979 MOBILITY GOAL STATUS: HCPCS | Mod: CK

## 2017-12-06 PROCEDURE — 85730 THROMBOPLASTIN TIME PARTIAL: CPT

## 2017-12-06 PROCEDURE — 97116 GAIT TRAINING THERAPY: CPT

## 2017-12-06 PROCEDURE — 94640 AIRWAY INHALATION TREATMENT: CPT

## 2017-12-06 PROCEDURE — 11000001 HC ACUTE MED/SURG PRIVATE ROOM

## 2017-12-06 PROCEDURE — 84100 ASSAY OF PHOSPHORUS: CPT

## 2017-12-06 PROCEDURE — 85025 COMPLETE CBC W/AUTO DIFF WBC: CPT

## 2017-12-06 PROCEDURE — 80053 COMPREHEN METABOLIC PANEL: CPT

## 2017-12-06 RX ORDER — TRAMADOL HYDROCHLORIDE 50 MG/1
50 TABLET ORAL EVERY 6 HOURS PRN
Qty: 40 TABLET | Refills: 0 | Status: SHIPPED | OUTPATIENT
Start: 2017-12-06

## 2017-12-06 RX ORDER — SODIUM CHLORIDE 9 MG/ML
INJECTION, SOLUTION INTRAVENOUS CONTINUOUS
Status: ACTIVE | OUTPATIENT
Start: 2017-12-06 | End: 2017-12-07

## 2017-12-06 RX ADMIN — SOLIFENACIN SUCCINATE 10 MG: 5 TABLET, FILM COATED ORAL at 09:12

## 2017-12-06 RX ADMIN — SUCRALFATE 1 G: 1 TABLET ORAL at 05:12

## 2017-12-06 RX ADMIN — HYDROCODONE BITARTRATE AND ACETAMINOPHEN 1 TABLET: 10; 325 TABLET ORAL at 08:12

## 2017-12-06 RX ADMIN — OLOPATADINE HYDROCHLORIDE 1 DROP: 1 SOLUTION/ DROPS OPHTHALMIC at 08:12

## 2017-12-06 RX ADMIN — MORPHINE SULFATE 1 MG: 10 INJECTION INTRAVENOUS at 05:12

## 2017-12-06 RX ADMIN — PSYLLIUM HUSK 1 PACKET: 3.4 POWDER ORAL at 09:12

## 2017-12-06 RX ADMIN — MUPIROCIN 1 G: 20 OINTMENT TOPICAL at 08:12

## 2017-12-06 RX ADMIN — PANTOPRAZOLE SODIUM 40 MG: 40 TABLET, DELAYED RELEASE ORAL at 09:12

## 2017-12-06 RX ADMIN — ENOXAPARIN SODIUM 40 MG: 100 INJECTION SUBCUTANEOUS at 05:12

## 2017-12-06 RX ADMIN — MUPIROCIN 1 G: 20 OINTMENT TOPICAL at 09:12

## 2017-12-06 RX ADMIN — LEVOTHYROXINE SODIUM 50 MCG: 50 TABLET ORAL at 05:12

## 2017-12-06 RX ADMIN — METOPROLOL SUCCINATE 25 MG: 25 TABLET, EXTENDED RELEASE ORAL at 09:12

## 2017-12-06 RX ADMIN — MORPHINE SULFATE 1 MG: 10 INJECTION INTRAVENOUS at 12:12

## 2017-12-06 RX ADMIN — SODIUM CHLORIDE: 0.9 INJECTION, SOLUTION INTRAVENOUS at 05:12

## 2017-12-06 RX ADMIN — AMIODARONE HYDROCHLORIDE 200 MG: 200 TABLET ORAL at 08:12

## 2017-12-06 RX ADMIN — LISINOPRIL 40 MG: 20 TABLET ORAL at 09:12

## 2017-12-06 RX ADMIN — HYDROCODONE BITARTRATE AND ACETAMINOPHEN 1 TABLET: 10; 325 TABLET ORAL at 01:12

## 2017-12-06 RX ADMIN — AMIODARONE HYDROCHLORIDE 200 MG: 200 TABLET ORAL at 09:12

## 2017-12-06 RX ADMIN — OLOPATADINE HYDROCHLORIDE 1 DROP: 1 SOLUTION/ DROPS OPHTHALMIC at 09:12

## 2017-12-06 RX ADMIN — HYDROCODONE BITARTRATE AND ACETAMINOPHEN 1 TABLET: 10; 325 TABLET ORAL at 09:12

## 2017-12-06 RX ADMIN — BIMATOPROST 1 DROP: 0.1 SOLUTION/ DROPS OPHTHALMIC at 08:12

## 2017-12-06 RX ADMIN — IPRATROPIUM BROMIDE 500 MCG: 0.5 SOLUTION RESPIRATORY (INHALATION) at 01:12

## 2017-12-06 RX ADMIN — SUCRALFATE 1 G: 1 TABLET ORAL at 12:12

## 2017-12-06 RX ADMIN — FUROSEMIDE 20 MG: 20 TABLET ORAL at 09:12

## 2017-12-06 NOTE — PLAN OF CARE
Problem: Physical Therapy Goal  Goal: Physical Therapy Goal  Goals to be met by: 18     Patient will increase functional independence with mobility by performin) sit <>sup with SBA  2) Sit <>stand with CGA X1  3) ambulate 75 feet with CGA X1  Outcome: Ongoing (interventions implemented as appropriate)  Initial evaluation complete PT to follow.

## 2017-12-06 NOTE — PROGRESS NOTES
Interval:   NAEO. Resting comfortably.    Vitals:  Temp:  [96.2 °F (35.7 °C)-98.5 °F (36.9 °C)] 97.6 °F (36.4 °C)  Pulse:  [62-72] 72  Resp:  [10-19] 18  SpO2:  [91 %-100 %] 100 %  BP: (123-189)/(56-79) 143/63    Scheduled Meds:    amiodarone  200 mg Oral BID    bimatoprost  1 drop Both Eyes QHS    enoxaparin  40 mg Subcutaneous Daily    furosemide  20 mg Oral Daily    levothyroxine  50 mcg Oral Before breakfast    lisinopril  40 mg Oral Daily    metoprolol succinate  25 mg Oral Daily    mupirocin  1 g Nasal BID    olopatadine  1 drop Both Eyes BID    pantoprazole  40 mg Oral Daily    psyllium  1 packet Oral Daily    solifenacin  10 mg Oral Daily    sucralfate  1 g Oral QID     Continuous Infusions:    PRN Meds: acetaminophen, acetaminophen, dextrose 50%, dextrose 50%, diphenhydrAMINE-zinc acetate 1-0.1%, docusate sodium, glucagon (human recombinant), glucose, glucose, hydrALAZINE, hydrocodone-acetaminophen 10-325mg, hydrocodone-acetaminophen 5-325mg, insulin aspart, ipratropium, morphine, ondansetron, sodium chloride 0.9%, sodium chloride 0.9%, traMADol    Diet: Diet Adult Regular    Trended Lab Data:    Recent Labs  Lab 12/04/17  0444 12/04/17  1507 12/05/17  0430 12/06/17  0547 12/06/17  0814   WBC 6.47 7.58 6.56  --  10.29   HGB 8.9* 9.2* 8.6*  --  8.4*   HCT 27.4* 28.3* 26.3*  --  26.7*   * 160 152  --  179   MCV 93 93 91  --  94   RDW 16.3* 16.1* 15.9*  --  16.1*   *  --  132* 134*  --    K 3.3*  --  3.4* 3.9  --    CL 99  --  98 100  --    CO2 27  --  29 25  --    BUN 15  --  17 20  --    CREATININE 0.8  --  0.8 1.0  --    GLU 98  --  108 119*  --    PROT 6.0  --  6.0 6.0  --    ALBUMIN 2.4*  --  2.4* 2.4*  --    BILITOT 0.9  --  0.8 0.8  --    AST 79*  --  79* 71*  --    ALKPHOS 70  --  70 73  --    ALT 46*  --  51* 42  --        I/O last 3 completed shifts:  In: 900 [P.O.:100; I.V.:800]  Out: 1695 [Urine:1495; Blood:200]    Exam:  Gen NAD  Resp unlabored  CV RR  RLE:  Dressing  c/d/i  EHL/FHL/TA/GS intact  SILT s/s/sp/dp/t  BCR    Impression/Plan:  Alyssa Hastings is a 87 y.o. female with a right femoral neck fracture s/p R hip hemiarthroplasty on 12/5  - WBAT RLE, posterior hip precautions  - PT/OT  - medical management per primary        Kenton Crawford  PGY-3  [employer] Orthopa[facility] Surgery

## 2017-12-06 NOTE — PLAN OF CARE
Problem: Patient Care Overview  Goal: Plan of Care Review  Outcome: Ongoing (interventions implemented as appropriate)  Remains on 02 will cont to wean and encourage use of IS

## 2017-12-06 NOTE — PLAN OF CARE
Problem: Physical Therapy Goal  Goal: Physical Therapy Goal  Goals to be met by: 18     Patient will increase functional independence with mobility by performin) sit <>sup with SBA  2) Sit <>stand with CGA X1  3) ambulate 75 feet with CGA X1   Outcome: Ongoing (interventions implemented as appropriate)  Continue working toward goals.

## 2017-12-06 NOTE — PLAN OF CARE
Problem: Patient Care Overview  Goal: Plan of Care Review  Outcome: Ongoing (interventions implemented as appropriate)  AAOx4, resting in bed.  Denies pain, N/V, and SOB.  Dressing to R hip CDI. O2, hip abduction pillow, and tele in place.  Pulses present in B/l LE, no numbness or tingling.  Instructed to call for assistance.  Safety maintained.  Will continue to monitor.

## 2017-12-06 NOTE — PROGRESS NOTES
Progress Note  [facility]  Admit Date: 12/3/2017   LOS: 3 days   SUBJECTIVE:   Follow-up For: POD#1, right femoral neck fracture    Patient seen and examined this AM. Says she feels well and is relieved that surgery is over.     ROS  Denies CP, SOB, N/V.    OBJECTIVE:   Vital Signs (Most Recent)  Temp: 97.6 °F (36.4 °C) (12/06/17 0904)  Pulse: 70 (12/06/17 0915)  Resp: 18 (12/06/17 0904)  BP: (!) 143/63 (12/06/17 0904)  SpO2: 100 % (12/06/17 0800)    Physical Exam  General: AAOx3. NAD.  HEENT: NCAT. Moist mucous membranes. Small ecchymosis under bilateral eyelids   CV: RRR. NL S1/S2. No murmurs  Chest: CTA bilaterally, normal effort  Abd: +BS x 4. Soft. ND/NT. : urinary cath in place  Ext: Right hip bandaged, c/d/i  Neuro: No focal deficits  Psych: Good judgement and reason. No abnormal behaviors noted    Laboratory Data:  CBC    Recent Labs  Lab 12/04/17  1507 12/05/17  0430 12/06/17  0814   WBC 7.58 6.56 10.29   RBC 3.05* 2.88* 2.85*   HGB 9.2* 8.6* 8.4*   HCT 28.3* 26.3* 26.7*    152 179   MCV 93 91 94   MCH 30.2 29.9 29.5   MCHC 32.5 32.7 31.5*     CMP    Recent Labs  Lab 12/04/17  0444 12/05/17  0430 12/06/17  0547   CALCIUM 8.0* 8.1* 8.2*   PROT 6.0 6.0 6.0   * 132* 134*   K 3.3* 3.4* 3.9   CO2 27 29 25   CL 99 98 100   BUN 15 17 20   CREATININE 0.8 0.8 1.0   ALKPHOS 70 70 73   ALT 46* 51* 42   AST 79* 79* 71*   BILITOT 0.9 0.8 0.8       Recent Labs  Lab 12/04/17  0444 12/05/17  0430 12/06/17  0547   INR 1.1 1.1 1.1   APTT 30.0 30.1 30.0       ASSESSMENT/PLAN:   Alyssa Hastings is a 87 y.o. female admitted for right femoral fracture POD 1 right femoral neck fracture repair.            Closed displaced fracture of right femoral neck     - 2/2 mechanical fall   - Ortho consulted. POD 1 right femoral neck fracture repair  - Morphine PRN and Tramadol 50 mg    -PT/OT recs for SNF at discharge       Hypokalemia (resolved)     - K of 3.9            Hyponatremia     - Na 134  - Will continue  to monitor             CKD stage G3a/A1, GFR 45-59 and albumin creatinine ratio <30 mg/g     -eGFR of 51   -BUN 20/1.0   - Likely pre-renal. Will give fluids and hold nephrotoxic meds          Hypothyroidism     - continue synthroid 50 mcg          Essential hypertension     - acutely elevated in Ed.  Likely 2/2 to pain  - continue home medications: lisinopril 20 mg and metoprolol  --150s/60s         History of CABG         -Per chart check hx of CABG x2          -2D echo on admission with EF 55%, moderate         MVR and TVR        Dispo: F/U Placement     12/6/2017 Jodi Rowan MD  10:39 AM

## 2017-12-06 NOTE — PLAN OF CARE
[facility]    FACILITY TRANSFER ORDERS      Patient Name: Alyssa Hastings  YOB: 1930    PCP: Ashley Johnson MD   [facility] [address] [place] [address]  PCP Phone Number: [phone]  PCP Fax: [phone]    Encounter Date: 12/11/2017    Admit to: SNF    Vital Signs:  Routine    Diagnoses:   Active Hospital Problems    Diagnosis  POA    *Closed displaced fracture of right femoral neck [S72.001A]  Yes    Hypokalemia [E87.6]  Unknown    Hyponatremia [E87.1]  Yes    CKD stage G3a/A1, GFR 45-59 and albumin creatinine ratio <30 mg/g [N18.3]  Yes    Essential hypertension [I10]  Yes    Hypothyroidism [E03.9]  Yes      Resolved Hospital Problems    Diagnosis Date Resolved POA   No resolved problems to display.       Allergies:  Review of patient's allergies indicates:   Allergen Reactions    Actos [pioglitazone]     Amlodipine     Bentyl [dicyclomine]     Benzocaine     Declomycin [demeclocycline]     Doxycycline     Erythropoietin analogues     Gabapentin     Hydrocodone     Lipitor [atorvastatin]     Losartan     Metronidazole     Nortriptyline     Oxybutynin     Pcn [penicillins]     Potassium     Pravachol [pravastatin]     Procaine     Promethazine     Propranolol     Ramipril     Sulfa (sulfonamide antibiotics)        Diet: cardiac diet    Activities: Activity as tolerated    Nursing: Routinue     CONSULTS:    Physical Therapy to evaluate and treat 5x/week and Occupational Therapy to evaluate and treat. 5x/week    MISCELLANEOUS CARE:  Routine Skin for Bedridden Patients: Apply moisture barrier cream to all skin folds and wet areas in perineal area daily and after baths and all bowel movements.    WOUND CARE ORDERS  1) Yes: Surgical Wound:  Location: R. Hip      Consult ET nurse. Apply the following to wound: Right hip      Keep dressing clean, dry and intact. Change dressing PRN    2) Abrasion, Location: Right lower extremity  lateral leg      Apply the following to wound: Bactroban daily x1 week,     cover with bandage      Medications: Review discharge medications with patient and family and provide education.    Current Discharge Medication List      START taking these medications    Details   famotidine (PEPCID) 20 MG tablet Take 1 tablet (20 mg total) by mouth 2 (two) times daily.  Qty: 56 tablet, Refills: 0         CONTINUE these medications which have CHANGED    Details   !! aspirin 81 MG Chew Take 1 tablet (81 mg total) by mouth 2 (two) times daily for the next 6 weeks then 1 time daily.  Qty: 100 tablet, Refills: 3   12/11/17-01/08/18      traMADol (ULTRAM) 50 mg tablet Take 1 tablet (50 mg total) by mouth every 6 (six) hours as needed for Pain.  Qty: 40 tablet, Refills: 0         CONTINUE these medications which have NOT CHANGED    Details   acetaminophen (TYLENOL) 650 MG TbSR Take 1 tablet (650 mg total) by mouth every 12 (twelve) hours as needed (start with daily prn).  Qty: 60 tablet, Refills: 1      amiodarone (PACERONE) 200 MG Tab TAKE ONE TABLET BY MOUTH TWICE DAILY  Qty: 60 tablet, Refills: 5    Comments: ORIGINALLY WAS DR SANCHEZ      bimatoprost (LUMIGAN) 0.03 % ophthalmic drops Place 1 drop into both eyes nightly.  Qty: 2.5 mL, Refills: 6    Associated Diagnoses: Primary open angle glaucoma of both eyes, severe stage      brimonidine-timolol (COMBIGAN) 0.2-0.5 % Drop Place 1 drop into the left eye 2 (two) times daily.  Qty: 10 mL, Refills: 6    Associated Diagnoses: Primary open angle glaucoma of both eyes, severe stage      docusate sodium (COLACE) 100 MG capsule Take 1 capsule (100 mg total) by mouth 2 (two) times daily as needed for Constipation.  Qty: 60 capsule, Refills: 0      dorzolamide (TRUSOPT) 2 % ophthalmic solution Place 1 drop into the left eye 2 (two) times daily.  Qty: 10 mL, Refills: 6    Associated Diagnoses: Primary open angle glaucoma of both eyes, severe stage      esomeprazole (NEXIUM) 40 MG  capsule Take 1 capsule (40 mg total) by mouth before breakfast.  Qty: 30 capsule, Refills: 5      furosemide (LASIX) 20 MG tablet Take 1 tablet (20 mg total) by mouth once daily. May take an extra 20 mg weekly prn.  Qty: 40 tablet, Refills: 5      hyoscyamine (ANASPAZ,LEVSIN) 0.125 mg Tab Take 125 mcg by mouth every 4 (four) hours as needed.      ipratropium (ATROVENT) 0.02 % nebulizer solution Take 500 mcg by nebulization 4 (four) times daily. Rescue      ipratropium (ATROVENT) 0.06 % nasal spray USE 1 SPRAY INTO EACH NOSTRIL TWICE DAILY AS NEEDED FOR RHINITIS  Qty: 15 mL, Refills: 1      levocetirizine (XYZAL) 5 MG tablet TAKE ONE TABLET BY MOUTH EVERY MORNING  Qty: 30 tablet, Refills: 3      levothyroxine (SYNTHROID) 50 MCG tablet Take 1 tablet (50 mcg total) by mouth once daily.  Qty: 30 tablet, Refills: 5      lisinopril (PRINIVIL,ZESTRIL) 20 MG tablet TAKE ONE TABLET BY MOUTH EVERY DAY  Qty: 30 tablet, Refills: 5    Comments: REQUESTING REFILL. THANK YOU      METAMUCIL 0.52 gram capsule TAKE 2 TO 6 CAPSULES BY MOUTH DAILY OR TAKE 5 CAPSULES UP TO FOUR TIMES DAILY  Qty: 100 capsule, Refills: 1      metoprolol succinate (TOPROL-XL) 25 MG 24 hr tablet Take 1 tablet (25 mg total) by mouth once daily.  Qty: 30 tablet, Refills: 3      nitroGLYCERIN (NITROSTAT) 0.4 MG SL tablet Place 0.4 mg under the tongue every 5 (five) minutes as needed for Chest pain.      olopatadine (PATADAY) 0.2 % Drop Place 1 drop into both eyes once daily.  Qty: 2.5 mL, Refills: 6    Associated Diagnoses: Primary open angle glaucoma of both eyes, severe stage      PSYLLIUM HUSK/CALCIUM CARB (METAMUCIL PLUS CALCIUM ORAL) Take by mouth.      solifenacin (VESICARE) 10 MG tablet Take 1 tablet (10 mg total) by mouth once daily.  Qty: 30 tablet, Refills: 11      sucralfate (CARAFATE) 1 gram tablet Take 1 g by mouth 4 (four) times daily.                  _________________________________  Jodi Rowan MD  12/11/2017

## 2017-12-06 NOTE — PT/OT/SLP EVAL
Physical Therapy Evaluation/Treatment     Patient Name:  Alyssa Hastings   MRN:  [id number]    Recommendations:     Discharge Recommendations:  nursing facility, skilled   Discharge Equipment Recommendations:  (Defer to SNF)   Barriers to discharge: Decreased caregiver support    Assessment:     Alyssa Hastings is a 87 y.o. female admitted with a medical diagnosis of Closed displaced fracture of right femoral neck.  She presents with the following impairments/functional limitations:  weakness, impaired endurance, impaired self care skills, gait instability, impaired functional mobilty, impaired balance, visual deficits, decreased coordination, decreased lower extremity function, pain, decreased ROM, edema, impaired skin, orthopedic precautions. She demonstrates poor endurance and is unable to stand/ maintain standing without assistance. At this time she is unsafe to return home as she requires additional assistance. She would benefit from continued PT services to improve current impairments and return to a more independent functional mobility level.    Rehab Prognosis:  good; patient would benefit from acute skilled PT services to address these deficits and reach maximum level of function.      Recent Surgery: Procedure(s) (LRB):  HEMIARTHROPLASTY - RIGHT HIP (Right) 1 Day Post-Op    Plan:     During this hospitalization, patient to be seen BID to address the above listed problems via gait training, therapeutic activities, therapeutic exercises, neuromuscular re-education  · Plan of Care Expires:  01/06/18   Plan of Care Reviewed with: patient    Subjective     Communicated with GABRIEL Henry prior to session.  Patient found with HOB elevated, SCDs, Flex pressure relieving boots, bed alarm and oxygen upon PT entry to room, agreeable to evaluation.      Chief Complaint: pain, weakness  Patient comments/goals: improve pain and weakness  Pain/Comfort:  Pain Rating 1: 7/10  Location - Side 1: Right  Location 1:  "hip  Pain Addressed 1: Pre-medicate for activity, Reposition, Distraction  Pain Rating Post-Intervention 1: 8/10    Patients cultural, spiritual, Temple conflicts given the current situation: None verbalized to PT    Living Environment:  Pt lives in an "independent living facility" she has assistance for daily bathing, and dressing. Her meals are made or she microwaves pre-made dinners. She uses a Rollator for ambulation and reports approximately once per month. Pt reports having a son who lives close by, and reports she was receiving HHPT/OT PTA      Prior to admission, patients level of function was mod I for ambulation and assistance for ADLs.  Patient has the following equipment: rollator.  DME owned (not currently used): none.  Upon discharge, patient will have assistance from company services, likely provided through assisted living facility, but this remains unclear at this time.    Objective:     Patient found with: bed alarm, hip abduction pillow, oxygen, pressure relief boots     General Precautions: Standard, fall   Orthopedic Precautions:RLE posterior precautions, RLE weight bearing as tolerated   Braces: N/A     Exams:  · Cognitive Exam:  Patient is oriented to Person, Place and Situation and follows 100% of simple  commands   · Gross Motor Coordination:  required increased speed and motiviation   · Postural Exam:  Patient presented with the following abnormalities:    · -       Rounded shoulders  · -       Forward head  · Skin Integrity/Edema:      · -       Edema of BLE, pt reports this is chronic. Upon removing SCDs, therapists noticed indentations left on LLE>RLE from SCDs and redness noted on the medial aspect of the left leg. Nursing was notified and SCDs were left off and the end of the treatment session. Additionally, widespread redness was noticed on pt's back, nursing was notified.   · RLE Strength: Grossly 3/5  · LLE ROM: WFL  · LLE Strength: grossly 3/5    Functional Mobility:  · Bed " Mobility:     · Rolling Right: minimum assistance and moderate assistance  · Scooting: moderate assistance  · Bridging: minimum assistance and moderate assistance  · Supine to Sit: minimum assistance X2  · Sit to Supine: Max X2  · Transfers:     · Sit to Stand:  Min A X2 for first trial, Mod A X2 for last two trials. with rolling walker  · Gait: 4 side steps to HOB with Mod X2 and RW. Pt with increased need flexion and occassinal Max A to prevent LOB  · Balance: sitting balance if good, requries assistance for static and dynamic standing    AM-PAC 6 CLICK MOBILITY  Total Score:11       Therapeutic Activities and Exercises:  n/a    Patient left HOB elevated with all lines intact, call button in reach, bed alarm on and GABRIEL Henry notified.    GOALS:    Physical Therapy Goals        Problem: Physical Therapy Goal    Goal Priority Disciplines Outcome Goal Variances Interventions   Physical Therapy Goal     PT/OT, PT Ongoing (interventions implemented as appropriate)     Description:  Goals to be met by: 18     Patient will increase functional independence with mobility by performin) sit <>sup with SBA  2) Sit <>stand with CGA X1  3) ambulate 75 feet with CGA X1                    History:     Past Medical History:   Diagnosis Date    Coronary artery disease     Diabetes mellitus     GERD (gastroesophageal reflux disease)     Glaucoma     Hypertension     Stroke     Thyroid disease        Past Surgical History:   Procedure Laterality Date    APPENDECTOMY      CARDIAC SURGERY      CHOLECYSTECTOMY      EYE SURGERY      HYSTERECTOMY         Clinical Decision Making:     History  Co-morbidities and personal factors that may impact the plan of care Examination  Body Structures and Functions, activity limitations and participation restrictions that may impact the plan of care Clinical Presentation   Decision Making/ Complexity Score   Co-morbidities:   [] Time since onset of injury / illness /  exacerbation  [] Status of current condition  []Patient's cognitive status and safety concerns    [] Multiple Medical Problems (see med hx)  Personal Factors:   [x] Patient's age  [x] Prior Level of function   [x] Patient's home situation (environment and family support)  [] Patient's level of motivation  [] Expected progression of patient      HISTORY:(criteria)    [] 82946 - no personal factors/history    [x] 21658 - has 1-2 personal factor/comorbidity     [x] 94193 - has >3 personal factor/comorbidity     Body Regions:  [] Objective examination findings  [] Head     []  Neck  [] Trunk   [] Upper Extremity  [x] Lower Extremity    Body Systems:  [x] For communication ability, affect, cognition, language, and learning style: the assessment of the ability to make needs known, consciousness, orientation (person, place, and time), expected emotional /behavioral responses, and learning preferences (eg, learning barriers, education  needs)  [] For the neuromuscular system: a general assessment of gross coordinated movement (eg, balance, gait, locomotion, transfers, and transitions) and motor function  (motor control and motor learning)  [x] For the musculoskeletal system: the assessment of gross symmetry, gross range of motion, gross strength, height, and weight  [] For the integumentary system: the assessment of pliability(texture), presence of scar formation, skin color, and skin integrity  [] For cardiovascular/pulmonary system: the assessment of heart rate, respiratory rate, blood pressure, and edema     Activity limitations:    [] Patient's cognitive status and saf ety concerns          [x] Status of current condition      [] Weight bearing restriction  [] Cardiopulmunary Restriction    Participation Restrictions:   [] Goals and goal agreement with the patient     [] Rehab potential (prognosis) and probable outcome      Examination of Body System: (criteria)    [] 08557 - addressing 1-2 elements    [x] 79679 -  addressing a total of 3 or more elements     [] 43982 -  Addressing a total of 4 or more elements         Clinical Presentation: (criteria)  Stable - 23749     On examination of body system using standardized tests and measures patient presents with 4 or more elements from any of the following: body structures and functions, activity limitations, and/or participation restrictions.  Leading to a clinical presentation that is considered stable and/or uncomplicated                              Clinical Decision Making  (Eval Complexity):  Low- 24033     Time Tracking:     PT Received On: 12/06/17  PT Start Time: 0927     PT Stop Time: 0938  PT Total Time (min): 11 min +36 minutes 47 minutes   Returned to complete Evaluation/treatment after pain medication given. [id number] Co-treat with OT      Billable Minutes: Evaluation 10 and Gait Training 20      Casey Covington, PT  12/06/2017

## 2017-12-06 NOTE — ANESTHESIA POSTPROCEDURE EVALUATION
"Anesthesia Post Evaluation    Patient: Alyssa Hastings    Procedure(s) Performed: Procedure(s) (LRB):  HEMIARTHROPLASTY - RIGHT HIP (Right)    Final Anesthesia Type: general  Patient location during evaluation: PACU  Patient participation: Yes- Able to Participate  Level of consciousness: awake and alert  Post-procedure vital signs: reviewed and stable  Pain management: adequate  Airway patency: patent  PONV status at discharge: No PONV  Anesthetic complications: no (Pt emerged with ecchymosis present under both eyes in the inner crease., in the area where a mask airway would be placed.)      Cardiovascular status: blood pressure returned to baseline  Respiratory status: unassisted  Hydration status: euvolemic  Follow-up not needed.        Visit Vitals  BP (!) 175/73 (BP Location: Left arm, Patient Position: Lying)   Pulse 64   Temp 36.4 °C (97.6 °F) (Oral)   Resp 18   Ht 5' 5" (1.651 m)   Wt 81.2 kg (179 lb 0.2 oz)   SpO2 100%   Breastfeeding? No   BMI 29.79 kg/m²       Pain/Ron Score: Pain Assessment Performed: Yes (12/5/2017  5:25 PM)  Presence of Pain: complains of pain/discomfort (12/5/2017  5:25 PM)  Pain Rating Prior to Med Admin: 4 (12/5/2017  5:14 PM)  Ron Score: 9 (12/5/2017  5:25 PM)      "

## 2017-12-06 NOTE — PT/OT/SLP EVAL
Occupational Therapy    Evaluation    Name: Alyssa Hastings  MRN: [id number]  Admitting Diagnosis:  Closed displaced fracture of right femoral neck 1 Day Post-Op    Recommendations:     Discharge Recommendations: nursing facility, skilled  Discharge Equipment Recommendations:  bedside commode, walker, rolling  Barriers to discharge:  Decreased caregiver support    History:     Occupational Profile:  Living Environment: A'ed living  Previous level of function: Mod (I) via rollator w/ all fxnl tasks except A required for shower t/f's, bathing & drerssing  Roles and Routines: no cooking, cleaning or driving  Equipment Owned:  bath bench, rollator  Assistance upon Discharge: A w/ most BADLs, fxnl mobility & fxnl t/f's    Past Medical History:   Diagnosis Date    Coronary artery disease     Diabetes mellitus     GERD (gastroesophageal reflux disease)     Glaucoma     Hypertension     Stroke     Thyroid disease        Past Surgical History:   Procedure Laterality Date    APPENDECTOMY      CARDIAC SURGERY      CHOLECYSTECTOMY      EYE SURGERY      HYSTERECTOMY         Subjective     Chief Complaint: pain at R hip  Patient/Family stated goals: return home  Communicated with: nsg prior to session.  Pain/Comfort:  · Pain Rating 1: 7/10  · Location - Side 1: Right  · Location 1: hip  · Pain Addressed 1: Pre-medicate for activity, Reposition, Distraction, Nurse notified  · Pain Rating Post-Intervention 1: 8/10    Objective:     Patient found with: bed alarm, hip abduction pillow, oxygen, pressure relief boots, SCD    General Precautions: Standard, fall   Orthopedic Precautions:RLE posterior precautions, RLE weight bearing as tolerated   Braces:       Occupational Performance:    Bed Mobility:    · Patient completed Scooting/Bridging with minimum assistance  · Patient completed Supine to Sit with maximal assistance and 2 persons  · Patient completed Sit to Supine with maximal assistance and 2 persons    Functional  Mobility/Transfers:  · Sit<-->stand 1st attempt w/ EOB elevated & Min A x 2 via RW x <30sec w/ c/o min dizziness  · Sit<-->stand 2nd attempt w/ EOB elevated & Mod A x 2 via RW <30 sec w/ c/o signif dizziness  · Sit<-->stand 3rd attempt w/ EOB elevated & Mod A x 2 via RW & sidestepping to R w/ Mod A x 2    Activities of Daily Living:  · Grooming: stand by assistance and for face washing .  · LB Dressing: total assistance don socks    Cognitive/Visual Perceptual:  Visual/Perceptual:      -Impaired  acuity and R eye blind    Physical Exam:  Upper Extremity Range of Motion:     -       Right Upper Extremity: Deficits: shldr ~150*  -       Left Upper Extremity: Deficits: shldr ~150*  Upper Extremity Strength:    -       Right Upper Extremity: 4/5  -       Left Upper Extremity: 4/5    Patient left HOB elevated with all lines intact, call button in reach, bed alarm on, nsg notified and abd pillow/ flex boots applied    AMPA 6 Click:  AMPA Total Score: 14  Treatment & Education:  Edu/tx re: general safety techs, post hip precations & HEP. Pt verbalized/demo'ed understanding.  Provided pt w/ ice pack 2/2 R hip pain  Nsg notified of reddness on pt's back & signif edema at L lower ext/foot  Education:    Assessment:     Alyssa Hastings is a 87 y.o. female with a medical diagnosis of Closed displaced fracture of right femoral neck.  She presents withOT eval completed w/ PT this date. Pt w/ R LE post hip precautions & presents w/ decreased overall endurance/conditioning, balance/mobility & coordination w/ subsequent decline in (I)/safety w/ BADLs, fxnl mobility & fxnl t/f's. OT 5x/wk to increase phys/fxnl status & maximize potential to achieve established goals for near return to PLOF. Rec d/c to SNF w/ RW.   .  Performance deficits affecting function are weakness, impaired endurance, gait instability, impaired functional mobilty, impaired self care skills, impaired balance, visual deficits, decreased lower extremity  "function, decreased coordination, decreased safety awareness, pain, decreased ROM, impaired skin, edema, impaired joint extensibility.      Rehab Prognosis:  good; patient would benefit from acute skilled OT services to address these deficits and reach maximum level of function.         Clinical Decision Makin.  OT Low:  "Pt evaluation falls under low complexity for evaluation coding due to performance deficits noted in 1-3 areas as stated above and 0 co-morbities affecting current functional status. Data obtained from problem focused assessments. No modifications or assistance was required for completion of evaluation. Only brief occupational profile and history review completed."     Plan:     Patient to be seen 5 x/week to address the above listed problems via self-care/home management, therapeutic activities, therapeutic exercises  · Plan of Care Expires: 18  · Plan of Care Reviewed with: patient    This Plan of care has been discussed with the patient who was involved in its development and understands and is in agreement with the identified goals and treatment plan    GOALS:    Occupational Therapy Goals        Problem: Occupational Therapy Goal    Goal Priority Disciplines Outcome Interventions   Occupational Therapy Goal     OT, PT/OT Ongoing (interventions implemented as appropriate)    Description:  Goals to be met by: 18     Patient will increase functional independence with ADLs by performing:    LE Dressing with Moderate Assistance.  Grooming while standing at sink with Stand-by Assistance.  Toileting from toilet with Stand-by Assistance for hygiene and clothing management.   Toilet transfer to toilet with Stand-by Assistance.  Upper extremity exercise program x10 reps per handout, with independence.                      Time Tracking:     OT Date of Treatment: 17  OT Start Time: 1024  OT Stop Time: 1110  OT Total Time (min): 46 min    Billable Minutes:Panchito 15  There Activ " 15  Total time: 51    STEVEN Plunkett  12/6/2017

## 2017-12-06 NOTE — PLAN OF CARE
Problem: Physical Therapy Goal  Goal: Physical Therapy Goal  Goals to be met by: 18     Patient will increase functional independence with mobility by performin) sit <>sup with SBA  2) Sit <>stand with CGA X1  3) ambulate 75 feet with CGA X1   Outcome: Ongoing (interventions implemented as appropriate)  Continue to work toward goals.

## 2017-12-06 NOTE — PLAN OF CARE
Problem: Occupational Therapy Goal  Goal: Occupational Therapy Goal  Goals to be met by: 01/06/18     Patient will increase functional independence with ADLs by performing:    LE Dressing with Moderate Assistance.  Grooming while standing at sink with Stand-by Assistance.  Toileting from toilet with Stand-by Assistance for hygiene and clothing management.   Toilet transfer to toilet with Stand-by Assistance.  Upper extremity exercise program x10 reps per handout, with independence.    Outcome: Ongoing (interventions implemented as appropriate)  OT madhav completed w/ PT this date. Pt w/ R LE post hip precautions & presents w/ decreased overall endurance/conditioning, balance/mobility & coordination w/ subsequent decline in (I)/safety w/ BADLs, fxnl mobility & fxnl t/f's. OT 5x/wk to increase phys/fxnl status & maximize potential to achieve established goals for near return to PLOF. Rec d/c to SNF w/ RW.

## 2017-12-06 NOTE — PT/OT/SLP PROGRESS
Physical Therapy Treatment    Patient Name:  Alyssa Hastings   MRN:  [id number]    Recommendations:     Discharge Recommendations:   Nursing facility, skilled   Discharge Equipment Recommendations: bedside commode, walker, rolling   Barriers to discharge: Decreased caregiver support    Assessment:     Alyssa Hastings is a 87 y.o. female admitted with a medical diagnosis of Closed displaced fracture of right femoral neck.  She presents with the following impairments/functional limitations:   .Weakness, decreased endurance, decreased functional mobility, decreased self care, edema, decreased safety awareness, decreased ROM, decreased BUE/BLE strength, and orthopedic precautions.  Pt began  Wheezing with increased respirations when attempting to scoot to EOB.  Nsg was notified and increased pt's wall unit O2 NC from 1lpm to 2lpm, then assisted PTA in returning pt supine.  Pt's O2 sats were  98%- 100%. Pt would continue to benefit from P.T. To address impairments listed above.    Rehab Prognosis:  Good; patient would benefit from acute skilled PT services to address these deficits and reach maximum level of function.      Recent Surgery: Procedure(s) (LRB):  HEMIARTHROPLASTY - RIGHT HIP (Right) 1 Day Post-Op    Plan:     During this hospitalization, patient to be seen BID to address the above listed problems via gait training, therapeutic activities, therapeutic exercises, neuromuscular re-education  · Plan of Care Expires:  01/06/18   Plan of Care Reviewed with: patient    Subjective     Communicated with RN (Nguyen) prior to session.  Patient found supine upon PT entry to room, agreeable to treatment.      Patient comments: Pt agreed to tx.  Pain/Comfort:  · Pain Rating 1: 5/10  · Location - Side 1: Right  · Location 1: hip  · Pain Addressed 1: Pre-medicate for activity, Distraction, Reposition, Nurse notified  · Pain Rating Post-Intervention 1: 5/10    Patients cultural, spiritual, Orthodoxy conflicts given the  current situation: None verbalized to PT    Objective:     Patient found with: bed alarm, hip abduction pillow, oxygen, pressure relief boots     General Precautions: Standard, fall   Orthopedic Precautions:RLE posterior precautions, RLE weight bearing as tolerated   Braces: N/A     Functional Mobility:  · Bed Mobility:     · Scooting: moderate assistance and of 2 persons  · Supine to Sit:  moderate assistance of 2 persons  · Sit to Supine: maximal assistance and of 2 persons        AM-PAC 6 CLICK MOBILITY  Turning over in bed (including adjusting bedclothes, sheets and blankets)?: 3  Sitting down on and standing up from a chair with arms (e.g., wheelchair, bedside commode, etc.): 2  Moving from lying on back to sitting on the side of the bed?: 2  Moving to and from a bed to a chair (including a wheelchair)?: 2  Need to walk in hospital room?: 1  Climbing 3-5 steps with a railing?: 1  Total Score: 11       Therapeutic Activities and Exercises:   Supine > sit with pt assisted into long sit position with ModA with BUE posterolateral for support.  Pt began to move BLEs(one at a time) toward EOB with Mod A/MAx assist for RLE and turn upper body/scoot using BUEs to sit EOB.  [place] through the transfer, pt was noted to wheeze with increased respirations.  PT left to notify nsg and PTA stayed with pt.  Nsg entered room and increased O2 from 1lpm to 2lpm and checked O2 sats which were 98 to 100%. Pt was positioned supine with HOB elevated ~70*.   Respiratory therapy gave pt a breathing tx during BLE therex.  Supine BLE therex: AP, heelslides, hip ABD/ADD, QS(with increased tc/vc's)x 10 reps.  Therex with Min/ModA 10reps x 2 except QS.   Pt had difficulty understanding how to perform exercise.  ABD pillow and B heel pressure relief boots were donned. Hip precautions were reviewed with pt.  Pt knew 0 out of 3 precautions.  Continued education needed.    Patient left maximal assistance and of 2 persons with all lines intact,  call button in reach, bed alarm on and RN present..    GOALS:    Physical Therapy Goals        Problem: Physical Therapy Goal    Goal Priority Disciplines Outcome Goal Variances Interventions   Physical Therapy Goal     PT/OT, PT Ongoing (interventions implemented as appropriate)     Description:  Goals to be met by: 18     Patient will increase functional independence with mobility by performin) sit <>sup with SBA  2) Sit <>stand with CGA X1  3) ambulate 75 feet with CGA X1                    Time Tracking:     PT Received On: 17  PT Start Time: 1309     PT Stop Time: 1350  PT Total Time (min): 41 min     Billable Minutes: Therapeutic Activity 27 and Therapeutic Exercise 14    Treatment Type: Treatment  PT/PTA: PTA     PTA Visit Number: 1     Ayanna Tillman PTA  2017

## 2017-12-07 LAB
ALBUMIN SERPL BCP-MCNC: 2.2 G/DL
ALP SERPL-CCNC: 66 U/L
ALT SERPL W/O P-5'-P-CCNC: 36 U/L
ANION GAP SERPL CALC-SCNC: 5 MMOL/L
APTT BLDCRRT: 31.8 SEC
AST SERPL-CCNC: 74 U/L
BASOPHILS # BLD AUTO: 0.02 K/UL
BASOPHILS NFR BLD: 0.2 %
BILIRUB SERPL-MCNC: 0.7 MG/DL
BUN SERPL-MCNC: 27 MG/DL
CALCIUM SERPL-MCNC: 8.1 MG/DL
CHLORIDE SERPL-SCNC: 101 MMOL/L
CO2 SERPL-SCNC: 27 MMOL/L
CREAT SERPL-MCNC: 1.3 MG/DL
DIFFERENTIAL METHOD: ABNORMAL
EOSINOPHIL # BLD AUTO: 0.1 K/UL
EOSINOPHIL NFR BLD: 1.4 %
ERYTHROCYTE [DISTWIDTH] IN BLOOD BY AUTOMATED COUNT: 16.3 %
EST. GFR  (AFRICAN AMERICAN): 43 ML/MIN/1.73 M^2
EST. GFR  (NON AFRICAN AMERICAN): 37 ML/MIN/1.73 M^2
GLUCOSE SERPL-MCNC: 113 MG/DL
HCT VFR BLD AUTO: 23.5 %
HGB BLD-MCNC: 7.4 G/DL
INR PPP: 1.1
LYMPHOCYTES # BLD AUTO: 0.9 K/UL
LYMPHOCYTES NFR BLD: 9.9 %
MAGNESIUM SERPL-MCNC: 1.5 MG/DL
MCH RBC QN AUTO: 29.8 PG
MCHC RBC AUTO-ENTMCNC: 31.5 G/DL
MCV RBC AUTO: 95 FL
MONOCYTES # BLD AUTO: 1.2 K/UL
MONOCYTES NFR BLD: 13.7 %
NEUTROPHILS # BLD AUTO: 6.6 K/UL
NEUTROPHILS NFR BLD: 74.8 %
PHOSPHATE SERPL-MCNC: 3.5 MG/DL
PLATELET # BLD AUTO: 162 K/UL
PMV BLD AUTO: 9.9 FL
POTASSIUM SERPL-SCNC: 3.7 MMOL/L
PROT SERPL-MCNC: 5.6 G/DL
PROTHROMBIN TIME: 11.9 SEC
RBC # BLD AUTO: 2.48 M/UL
SODIUM SERPL-SCNC: 133 MMOL/L
WBC # BLD AUTO: 8.86 K/UL

## 2017-12-07 PROCEDURE — 36415 COLL VENOUS BLD VENIPUNCTURE: CPT

## 2017-12-07 PROCEDURE — 11000001 HC ACUTE MED/SURG PRIVATE ROOM

## 2017-12-07 PROCEDURE — 25000003 PHARM REV CODE 250: Performed by: STUDENT IN AN ORGANIZED HEALTH CARE EDUCATION/TRAINING PROGRAM

## 2017-12-07 PROCEDURE — 85610 PROTHROMBIN TIME: CPT

## 2017-12-07 PROCEDURE — 97530 THERAPEUTIC ACTIVITIES: CPT

## 2017-12-07 PROCEDURE — 27000221 HC OXYGEN, UP TO 24 HOURS

## 2017-12-07 PROCEDURE — 25000003 PHARM REV CODE 250: Performed by: ORTHOPAEDIC SURGERY

## 2017-12-07 PROCEDURE — 83735 ASSAY OF MAGNESIUM: CPT

## 2017-12-07 PROCEDURE — 85025 COMPLETE CBC W/AUTO DIFF WBC: CPT

## 2017-12-07 PROCEDURE — 94799 UNLISTED PULMONARY SVC/PX: CPT

## 2017-12-07 PROCEDURE — 80053 COMPREHEN METABOLIC PANEL: CPT

## 2017-12-07 PROCEDURE — 97110 THERAPEUTIC EXERCISES: CPT

## 2017-12-07 PROCEDURE — 97116 GAIT TRAINING THERAPY: CPT

## 2017-12-07 PROCEDURE — 84100 ASSAY OF PHOSPHORUS: CPT

## 2017-12-07 PROCEDURE — 85730 THROMBOPLASTIN TIME PARTIAL: CPT

## 2017-12-07 PROCEDURE — 25000003 PHARM REV CODE 250: Performed by: FAMILY MEDICINE

## 2017-12-07 PROCEDURE — 25000003 PHARM REV CODE 250

## 2017-12-07 PROCEDURE — 63600175 PHARM REV CODE 636 W HCPCS: Performed by: STUDENT IN AN ORGANIZED HEALTH CARE EDUCATION/TRAINING PROGRAM

## 2017-12-07 PROCEDURE — 94761 N-INVAS EAR/PLS OXIMETRY MLT: CPT

## 2017-12-07 RX ORDER — LANOLIN ALCOHOL/MO/W.PET/CERES
400 CREAM (GRAM) TOPICAL ONCE
Status: COMPLETED | OUTPATIENT
Start: 2017-12-07 | End: 2017-12-07

## 2017-12-07 RX ORDER — MAGNESIUM SULFATE 1 G/100ML
1 INJECTION INTRAVENOUS ONCE
Status: DISCONTINUED | OUTPATIENT
Start: 2017-12-07 | End: 2017-12-07

## 2017-12-07 RX ORDER — NAPROXEN SODIUM 220 MG/1
81 TABLET, FILM COATED ORAL DAILY
Refills: 0 | COMMUNITY
Start: 2017-12-07 | End: 2017-12-11

## 2017-12-07 RX ORDER — FERROUS SULFATE 325(65) MG
325 TABLET, DELAYED RELEASE (ENTERIC COATED) ORAL 2 TIMES DAILY
Status: DISCONTINUED | OUTPATIENT
Start: 2017-12-07 | End: 2017-12-12 | Stop reason: HOSPADM

## 2017-12-07 RX ADMIN — HYDROCODONE BITARTRATE AND ACETAMINOPHEN 1 TABLET: 10; 325 TABLET ORAL at 04:12

## 2017-12-07 RX ADMIN — OLOPATADINE HYDROCHLORIDE 1 DROP: 1 SOLUTION/ DROPS OPHTHALMIC at 08:12

## 2017-12-07 RX ADMIN — LISINOPRIL 40 MG: 20 TABLET ORAL at 09:12

## 2017-12-07 RX ADMIN — DOCUSATE SODIUM 100 MG: 100 CAPSULE, LIQUID FILLED ORAL at 09:12

## 2017-12-07 RX ADMIN — SODIUM CHLORIDE 500 ML: 0.9 INJECTION, SOLUTION INTRAVENOUS at 03:12

## 2017-12-07 RX ADMIN — SUCRALFATE 1 G: 1 TABLET ORAL at 01:12

## 2017-12-07 RX ADMIN — MUPIROCIN 1 G: 20 OINTMENT TOPICAL at 08:12

## 2017-12-07 RX ADMIN — MAGNESIUM OXIDE TAB 400 MG (241.3 MG ELEMENTAL MG) 400 MG: 400 (241.3 MG) TAB at 10:12

## 2017-12-07 RX ADMIN — OLOPATADINE HYDROCHLORIDE 1 DROP: 1 SOLUTION/ DROPS OPHTHALMIC at 09:12

## 2017-12-07 RX ADMIN — SUCRALFATE 1 G: 1 TABLET ORAL at 06:12

## 2017-12-07 RX ADMIN — FERROUS SULFATE TAB EC 325 MG (65 MG FE EQUIVALENT) 325 MG: 325 (65 FE) TABLET DELAYED RESPONSE at 09:12

## 2017-12-07 RX ADMIN — PANTOPRAZOLE SODIUM 40 MG: 40 TABLET, DELAYED RELEASE ORAL at 09:12

## 2017-12-07 RX ADMIN — HYDROCODONE BITARTRATE AND ACETAMINOPHEN 1 TABLET: 5; 325 TABLET ORAL at 08:12

## 2017-12-07 RX ADMIN — ACETAMINOPHEN 650 MG: 325 TABLET ORAL at 09:12

## 2017-12-07 RX ADMIN — FUROSEMIDE 20 MG: 20 TABLET ORAL at 09:12

## 2017-12-07 RX ADMIN — BIMATOPROST 1 DROP: 0.1 SOLUTION/ DROPS OPHTHALMIC at 09:12

## 2017-12-07 RX ADMIN — AMIODARONE HYDROCHLORIDE 200 MG: 200 TABLET ORAL at 10:12

## 2017-12-07 RX ADMIN — METOPROLOL SUCCINATE 25 MG: 25 TABLET, EXTENDED RELEASE ORAL at 09:12

## 2017-12-07 RX ADMIN — LEVOTHYROXINE SODIUM 50 MCG: 50 TABLET ORAL at 04:12

## 2017-12-07 RX ADMIN — SOLIFENACIN SUCCINATE 10 MG: 5 TABLET, FILM COATED ORAL at 09:12

## 2017-12-07 RX ADMIN — AMIODARONE HYDROCHLORIDE 200 MG: 200 TABLET ORAL at 09:12

## 2017-12-07 RX ADMIN — PSYLLIUM HUSK 1 PACKET: 3.4 POWDER ORAL at 09:12

## 2017-12-07 RX ADMIN — SUCRALFATE 1 G: 1 TABLET ORAL at 04:12

## 2017-12-07 RX ADMIN — FERROUS SULFATE TAB EC 325 MG (65 MG FE EQUIVALENT) 325 MG: 325 (65 FE) TABLET DELAYED RESPONSE at 10:12

## 2017-12-07 RX ADMIN — ENOXAPARIN SODIUM 40 MG: 100 INJECTION SUBCUTANEOUS at 06:12

## 2017-12-07 RX ADMIN — MUPIROCIN 1 G: 20 OINTMENT TOPICAL at 09:12

## 2017-12-07 NOTE — PROGRESS NOTES
[facility] sent updated notes to [facility] and [facility] via [facility].

## 2017-12-07 NOTE — PROGRESS NOTES
Patient has been accepted to [facility] and [facility]  Facility. Son updated and has appt at 10am @ [facility].   Plan for admission to skilled tomorrow.  TN to follow up in am.   PPD, passr,142, chest xray, and prescriptions forwarded to facilities via [facility].

## 2017-12-07 NOTE — PROGRESS NOTES
TN spoke with [facility], Patient has been declined for skilled services. TN to follow up.      Son Luis updated of above.  Additional referral sent to [facility]. Son will tour facility and update TN with first preference.   updated clinicals sent to facilities, TN to send updated PT/OT notes once entered.       Future Appointments  Date Time Provider Department Center   12/12/2017 9:00 AM HEARING AIDS, [facility] HEAR [facility]   1/10/2018 2:30 PM Isaias Elizabeth MD [facility] OPHTHAL [facility]

## 2017-12-07 NOTE — PT/OT/SLP PROGRESS
Physical Therapy Treatment    Patient Name:  Alyssa Hastings   MRN:  [id number]    Recommendations:     Discharge Recommendations:  nursing facility, skilled   Discharge Equipment Recommendations: walker, rolling, bedside commode   Barriers to discharge: None    Assessment:     Alyssa Hastings is a 87 y.o. female admitted with a medical diagnosis of Closed displaced fracture of right femoral neck.  She presents with the following impairments/functional limitations:  weakness, gait instability, impaired functional mobilty, impaired self care skills, decreased lower extremity function, decreased ROM, impaired balance, decreased upper extremity function, edema, orthopedic precautions, pain, visual deficits Patient slowly improving functional mobiilty.    Rehab Prognosis:  Good; patient would benefit from acute skilled PT services to address these deficits and reach maximum level of function.      Recent Surgery: Procedure(s) (LRB):  HEMIARTHROPLASTY - RIGHT HIP (Right) 2 Days Post-Op    Plan:     During this hospitalization, patient to be seen BID to address the above listed problems via gait training, therapeutic activities, therapeutic exercises  · Plan of Care Expires:  01/06/18   Plan of Care Reviewed with: patient    Subjective     Communicated with primary nurse prior to session.  Patient found supine HOB rasised upon PT entry to room, agreeable to treatment.      Chief Complaint: pain  Patient comments/goals: get well and go home  Pain/Comfort:  · Pain Rating 1: 5/10  · Location - Side 1: Right  · Location 1: hip  · Pain Addressed 1: Reposition, Distraction  · Pain Rating Post-Intervention 1: 5/10    Patients cultural, spiritual, Cheondoism conflicts given the current situation: None verbalized to PT    Objective:     Patient found with: hip abduction pillow, telemetry     General Precautions: Standard, fall   Orthopedic Precautions:RLE posterior precautions, RLE weight bearing as tolerated   Braces: N/A      Functional Mobility:  · Bed Mobility:     · Supine to Sit: moderate assistance and maximal assistance  · Sit to Supine: moderate assistance and maximal assistance  · Transfers:     · Sit to Stand:  moderate assistance with rolling walker  · Gait: Able to take lateral steps towards head of bed      AM-PAC 6 CLICK MOBILITY  Turning over in bed (including adjusting bedclothes, sheets and blankets)?: 3  Sitting down on and standing up from a chair with arms (e.g., wheelchair, bedside commode, etc.): 2  Moving from lying on back to sitting on the side of the bed?: 2  Moving to and from a bed to a chair (including a wheelchair)?: 2  Need to walk in hospital room?: 2  Climbing 3-5 steps with a railing?: 1  Total Score: 12       Therapeutic Activities and Exercises:   reviewed ankle pumps, QS and gluteal sets with patient, worked with patient on bed mobility/scooting  single leg bridging and pulling with UE's     Patient left HOB elevated with all lines intact, call button in reach and bed alarm on..    GOALS:    Physical Therapy Goals        Problem: Physical Therapy Goal    Goal Priority Disciplines Outcome Goal Variances Interventions   Physical Therapy Goal     PT/OT, PT Ongoing (interventions implemented as appropriate)     Description:  Goals to be met by: 18     Patient will increase functional independence with mobility by performin) sit <>sup with SBA  2) Sit <>stand with CGA X1  3) ambulate 75 feet with CGA X1                    Time Tracking:     PT Received On: 17  PT Start Time: 1334     PT Stop Time: 1400  PT Total Time (min): 26 min     Billable Minutes: Gait Training 10 and Therapeutic Activity 15    Treatment Type: Treatment  PT/PTA: PT     PTA Visit Number: 1     Kenton Mendez, PT  2017

## 2017-12-07 NOTE — PROGRESS NOTES
Interval:   NAEO. Resting comfortably this morning. Denies numbness/paresthesias.    Vitals:  Temp:  [96 °F (35.6 °C)-98 °F (36.7 °C)] 97.4 °F (36.3 °C)  Pulse:  [70-75] 70  Resp:  [16-20] 18  SpO2:  [96 %-99 %] 98 %  BP: ()/(54-64) 146/64    Scheduled Meds:    amiodarone  200 mg Oral BID    bimatoprost  1 drop Both Eyes QHS    enoxaparin  40 mg Subcutaneous Daily    ferrous sulfate  325 mg Oral BID    furosemide  20 mg Oral Daily    levothyroxine  50 mcg Oral Before breakfast    lisinopril  40 mg Oral Daily    magnesium oxide  400 mg Oral Once    metoprolol succinate  25 mg Oral Daily    mupirocin  1 g Nasal BID    olopatadine  1 drop Both Eyes BID    pantoprazole  40 mg Oral Daily    psyllium  1 packet Oral Daily    solifenacin  10 mg Oral Daily    sucralfate  1 g Oral QID     Continuous Infusions:    PRN Meds: acetaminophen, acetaminophen, dextrose 50%, dextrose 50%, diphenhydrAMINE-zinc acetate 1-0.1%, docusate sodium, glucagon (human recombinant), glucose, glucose, hydrALAZINE, hydrocodone-acetaminophen 10-325mg, hydrocodone-acetaminophen 5-325mg, insulin aspart, ipratropium, morphine, ondansetron, sodium chloride 0.9%, sodium chloride 0.9%, traMADol    Diet: Diet Adult Regular  Diet Cardiac    Trended Lab Data:    Recent Labs  Lab 12/05/17  0430 12/06/17  0547 12/06/17  0814 12/07/17  0435   WBC 6.56  --  10.29 8.86   HGB 8.6*  --  8.4* 7.4*   HCT 26.3*  --  26.7* 23.5*     --  179 162   MCV 91  --  94 95   RDW 15.9*  --  16.1* 16.3*   * 134*  --  133*   K 3.4* 3.9  --  3.7   CL 98 100  --  101   CO2 29 25  --  27   BUN 17 20  --  27*   CREATININE 0.8 1.0  --  1.3    119*  --  113*   PROT 6.0 6.0  --  5.6*   ALBUMIN 2.4* 2.4*  --  2.2*   BILITOT 0.8 0.8  --  0.7   AST 79* 71*  --  74*   ALKPHOS 70 73  --  66   ALT 51* 42  --  36       I/O last 3 completed shifts:  In: -   Out: 900 [Urine:900]    Exam:  Gen NAD  Resp unlabored  CV RR  RLE:  Dressing c/d/i  EHL/FHL/TA/GS  intact  SILT s/s/sp/dp/t  BCR    Impression/Plan:  Alyssa Hastings is a 87 y.o. female with a right femoral neck fracture s/p R hip hemiarthroplasty on 12/5  - WBAT RLE, posterior hip precautions  - H/H 7.4/23.5  - PT/OT  - medical management per primary        Kenton Crawford  PGY-3  [employer] Orthopaedic Surgery

## 2017-12-07 NOTE — PT/OT/SLP PROGRESS
Occupational Therapy   Treatment    Name: Alyssa Hastings  MRN: [id number]  Admitting Diagnosis:  Closed displaced fracture of right femoral neck  2 Days Post-Op    Recommendations:     Discharge Recommendations: nursing facility, skilled  Discharge Equipment Recommendations:  walker, rolling, bedside commode  Barriers to discharge:  Decreased caregiver support    Subjective     Communicated with: nsg prior to session.  Pain/Comfort:  · Pain Rating 1: 5/10  · Location - Side 1: Right  · Location - Orientation 1: generalized  · Location 1: hip  · Pain Addressed 1: Pre-medicate for activity, Distraction, Reposition  · Pain Rating Post-Intervention 1: 5/10    Objective:     Patient found with:      General Precautions: Standard, fall   Orthopedic Precautions:RLE weight bearing as tolerated, RLE posterior precautions   Braces:       Occupational Performance:    Bed Mobility:    · Patient completed Scooting/Bridging with maximal assistance  · Patient completed Supine to Sit with maximal assistance  · Patient completed Sit to Supine with maximal assistance     Functional Mobility/Transfers:  · Patient completed Sit <> Stand Transfer with moderate assistance and maximal assistance  with  rolling walker   3 trials during session     Activities of Daily Living:  · Feeding:  contact guard assistance EOB to drink from cup   · UB Dressing: moderate assistance laurel gown   · LB Dressing: total assistance laurel/doff socks   · Toileting: total assistance and dependence incontinent urine during standing trials     Patient left supine with all lines intact, call button in reach, bed alarm on, nsg notified and hip abduction brace applied     AMPA 6 Click:  AMPA Total Score: 14    Treatment & Education:  3 x 10 BUE therex without resistance 2/2 pt reports of R shoulder and L elbow soreness: chest press, bicep curls, straight arm raises, shoulder press   Education:    Assessment:     Alyssa Hastings is a 87 y.o. female with a medical  diagnosis of Closed displaced fracture of right femoral neck.  .  Performance deficits affecting function are weakness, gait instability, impaired balance, impaired endurance, decreased lower extremity function, impaired self care skills, impaired functional mobilty, edema, pain, orthopedic precautions, impaired cognition, decreased safety awareness.      Rehab Prognosis:  Good ; patient would benefit from acute skilled OT services to address these deficits and reach maximum level of function.       Plan:     Patient to be seen 5 x/week to address the above listed problems via self-care/home management, therapeutic activities, therapeutic exercises  · Plan of Care Expires: 01/06/18  · Plan of Care Reviewed with: patient    This Plan of care has been discussed with the patient who was involved in its development and understands and is in agreement with the identified goals and treatment plan    GOALS:    Occupational Therapy Goals        Problem: Occupational Therapy Goal    Goal Priority Disciplines Outcome Interventions   Occupational Therapy Goal     OT, PT/OT Ongoing (interventions implemented as appropriate)    Description:  Goals to be met by: 01/06/18     Patient will increase functional independence with ADLs by performing:    LE Dressing with Moderate Assistance.  Grooming while standing at sink with Stand-by Assistance.  Toileting from toilet with Stand-by Assistance for hygiene and clothing management.   Toilet transfer to toilet with Stand-by Assistance.  Upper extremity exercise program x10 reps per handout, with independence.                      Time Tracking:     OT Date of Treatment: 12/07/17  OT Start Time: 1101  OT Stop Time: 1141  OT Total Time (min): 40 min    Billable Minutes:Therapeutic Activity 13  Therapeutic Exercise 10    Co treatment with PT       Elena Bush OT  12/7/2017

## 2017-12-07 NOTE — PT/OT/SLP PROGRESS
Physical Therapy Treatment    Patient Name:  Alyssa Hastings   MRN:  [id number]    Recommendations:     Discharge Recommendations:  nursing facility, skilled   Discharge Equipment Recommendations: walker, rolling, bedside commode   Barriers to discharge: None    Assessment:     Alyssa Hastings is a 87 y.o. female admitted with a medical diagnosis of Closed displaced fracture of right femoral neck.  She presents with the following impairments/functional limitations:  weakness, impaired self care skills, impaired functional mobilty, decreased lower extremity function, decreased upper extremity function, decreased ROM, gait instability, impaired balance, orthopedic precautions, visual deficits, edema Patient with improved functional mobility today.    Rehab Prognosis:  Good; patient would benefit from acute skilled PT services to address these deficits and reach maximum level of function.      Recent Surgery: Procedure(s) (LRB):  HEMIARTHROPLASTY - RIGHT HIP (Right) 2 Days Post-Op    Plan:     During this hospitalization, patient to be seen BID to address the above listed problems via gait training, therapeutic activities, therapeutic exercises  · Plan of Care Expires:  01/06/18   Plan of Care Reviewed with: patient    Subjective     Communicated with primary nurse prior to session.  Patient found supine with abd pillow in place upon PT entry to room, agreeable to treatment.      Chief Complaint: pain   Patient comments/goals: get well and go home  Pain/Comfort:  · Pain Rating 1: 6/10  · Location - Side 1: Right  · Location 1: hip  · Pain Addressed 1: Reposition, Distraction  · Pain Rating Post-Intervention 1: 6/10    Patients cultural, spiritual, Orthodox conflicts given the current situation: None verbalized to PT    Objective:     Patient found with: hip abduction pillow, telemetry, peripheral IV     General Precautions: Standard, fall   Orthopedic Precautions:RLE posterior precautions, RLE weight bearing as  tolerated   Braces: N/A     Functional Mobility:  · Bed Mobility:     · Supine to Sit: maximal assistance  · Sit to Supine: maximal assistance  · Transfers:     · Sit to Stand:  moderate assistance with rolling walker  · Gait: Able to take 2 forward/backward steps and lateral steps with mod assist +2   · Balance: sitting Fair +, Standing poor      AM-PAC 6 CLICK MOBILITY  Turning over in bed (including adjusting bedclothes, sheets and blankets)?: 3  Sitting down on and standing up from a chair with arms (e.g., wheelchair, bedside commode, etc.): 2  Moving from lying on back to sitting on the side of the bed?: 2  Moving to and from a bed to a chair (including a wheelchair)?: 2  Need to walk in hospital room?: 2  Climbing 3-5 steps with a railing?: 1  Total Score: 12       Therapeutic Activities and Exercises:   Performed sit to stand X 4 with steps taken mod/max assist needed Patient sat EOB performed LAQ and ankle pumps 8 to 10 reps each    Patient left supine with all lines intact, call button in reach and bed alarm on..    GOALS:    Physical Therapy Goals        Problem: Physical Therapy Goal    Goal Priority Disciplines Outcome Goal Variances Interventions   Physical Therapy Goal     PT/OT, PT Ongoing (interventions implemented as appropriate)     Description:  Goals to be met by: 18     Patient will increase functional independence with mobility by performin) sit <>sup with SBA  2) Sit <>stand with CGA X1  3) ambulate 75 feet with CGA X1                    Time Tracking:     PT Received On: 17  PT Start Time: 1101     PT Stop Time: 1129  PT Total Time (min): 28 min     Billable Minutes: Gait Training 15    Treatment Type: Treatment  PT/PTA: PT     PTA Visit Number: 1     Kenton Mendez, PT  2017

## 2017-12-07 NOTE — PROGRESS NOTES
Progress Note  [facility]  Admit Date: 12/3/2017   LOS: 4 days   SUBJECTIVE:   Follow-up For: s/p Right hip hemiarthroplasty on 12/5    Patient seen and examined this AM. Says she feels well this AM. Hip pain is being controlled.     ROS  Denies CP, SOB, N/V, abdominal pain.    OBJECTIVE:   Vital Signs (Most Recent)  Temp: 97.4 °F (36.3 °C) (12/07/17 0743)  Pulse: 70 (12/07/17 0743)  Resp: 18 (12/07/17 0743)  BP: (!) 146/64 (12/07/17 0743)  SpO2: 98 % (12/07/17 0740)    I & O (Last 24H):  Intake/Output Summary (Last 24 hours) at 12/07/17 0949  Last data filed at 12/06/17 1350   Gross per 24 hour   Intake                0 ml   Output              350 ml   Net             -350 ml     Wt Readings from Last 3 Encounters:   12/06/17 78.8 kg (173 lb 11.6 oz)   09/27/17 81.2 kg (179 lb)   09/21/17 75.3 kg (166 lb 0.1 oz)       Current Diet Order   Procedures    Diet Adult Regular    Diet Cardiac        Physical Exam  General: AAOx3. NAD.  HEENT: NCAT. Moist mucous membranes. Resolving small ecchymosis under bilateral eyelids   CV: RRR. NL S1/S2. No murmurs  Chest: CTA bilaterally, normal effort  Abd: +BS x 4. Soft. ND/NT.   Ext: Right hip bandaged, c/d/i  Neuro: No focal deficits  Psych: Good judgement and reason. No abnormal behaviors noted    Laboratory Data:  CBC    Recent Labs  Lab 12/05/17  0430 12/06/17  0814 12/07/17  0435   WBC 6.56 10.29 8.86   RBC 2.88* 2.85* 2.48*   HGB 8.6* 8.4* 7.4*   HCT 26.3* 26.7* 23.5*    179 162   MCV 91 94 95   MCH 29.9 29.5 29.8   MCHC 32.7 31.5* 31.5*     CMP    Recent Labs  Lab 12/05/17  0430 12/06/17  0547 12/07/17  0435   CALCIUM 8.1* 8.2* 8.1*   PROT 6.0 6.0 5.6*   * 134* 133*   K 3.4* 3.9 3.7   CO2 29 25 27   CL 98 100 101   BUN 17 20 27*   CREATININE 0.8 1.0 1.3   ALKPHOS 70 73 66   ALT 51* 42 36   AST 79* 71* 74*   BILITOT 0.8 0.8 0.7     POCT-Glucose  POCT Glucose   Date Value Ref Range Status   12/06/2017 138 (H) 70 - 110 mg/dL Final   12/06/2017 153  (H) 70 - 110 mg/dL Final   12/06/2017 129 (H) 70 - 110 mg/dL Final   12/05/2017 128 (H) 70 - 110 mg/dL Final   12/05/2017 138 (H) 70 - 110 mg/dL Final   12/05/2017 119 (H) 70 - 110 mg/dL Final   12/04/2017 119 (H) 70 - 110 mg/dL Final   12/04/2017 129 (H) 70 - 110 mg/dL Final   12/04/2017 121 (H) 70 - 110 mg/dL Final     COAGS    Recent Labs  Lab 12/05/17  0430 12/06/17  0547 12/07/17  0435   INR 1.1 1.1 1.1   APTT 30.1 30.0 31.8       ASSESSMENT/PLAN:   Alyssa Hastings is a 87 y.o. female admitted for right femoral fracture POD 2 s/p right femoral neck fracture repair.            Closed displaced fracture of right femoral neck     - 2/2 mechanical fall   - POD 2 right femoral neck fracture repair   -PT/OT recs for SNF at discharge       Hypokalemia (resolved)     - K of 3.7             Hyponatremia     - Na 133  - Will continue to monitor             CKD stage G3a/A1, GFR 45-59 and albumin creatinine ratio <30 mg/g     -eGFR of 37  -BUN/Cr 27/1.3  - Likely pre-renal. IVFs administered. Will continue to monitor          Hypothyroidism     - continue synthroid 50 mcg          Essential hypertension     - acutely elevated in ED on admit.  Likely 2/2 to pain  - continue home medications: lisinopril 20 mg and metoprolol  -BP stable         History of CABG         -Per chart check hx of CABG x2          -2D echo on admission with EF 55%, moderate         MVR and TVR           -On home Lisinopril and beta blocker. Per chart              check, allergy to statin      Dispo: F/U Placement, BUN/Cr      12/7/2017 Jodi Rowan MD  9:49 AM

## 2017-12-07 NOTE — PLAN OF CARE
Problem: Occupational Therapy Goal  Goal: Occupational Therapy Goal  Goals to be met by: 01/06/18     Patient will increase functional independence with ADLs by performing:    LE Dressing with Moderate Assistance.  Grooming while standing at sink with Stand-by Assistance.  Toileting from toilet with Stand-by Assistance for hygiene and clothing management.   Toilet transfer to toilet with Stand-by Assistance.  Upper extremity exercise program x10 reps per handout, with independence.     Outcome: Ongoing (interventions implemented as appropriate)  Pt progressing towards goals. Cont OT POC SNF at d/c

## 2017-12-07 NOTE — PROGRESS NOTES
TN Spoke with pt and Son, Luis. Pt agreeable to skilled services.  TN emailed SNf list to Son.Luis gave TN given permission to send referral to  [facility], [facility], and [facility], Referral sent via [facility]. TN to follow up.

## 2017-12-07 NOTE — PLAN OF CARE
Problem: Patient Care Overview  Goal: Plan of Care Review  Outcome: Ongoing (interventions implemented as appropriate)  Cont to encourage deep breaths

## 2017-12-08 LAB
ALBUMIN SERPL BCP-MCNC: 2.3 G/DL
ALP SERPL-CCNC: 72 U/L
ALT SERPL W/O P-5'-P-CCNC: 34 U/L
ANION GAP SERPL CALC-SCNC: 11 MMOL/L
AST SERPL-CCNC: 64 U/L
BASOPHILS # BLD AUTO: 0.02 K/UL
BASOPHILS NFR BLD: 0.2 %
BILIRUB SERPL-MCNC: 0.8 MG/DL
BUN SERPL-MCNC: 30 MG/DL
CALCIUM SERPL-MCNC: 8.5 MG/DL
CHLORIDE SERPL-SCNC: 99 MMOL/L
CO2 SERPL-SCNC: 23 MMOL/L
CREAT SERPL-MCNC: 1.2 MG/DL
DIFFERENTIAL METHOD: ABNORMAL
EOSINOPHIL # BLD AUTO: 0.1 K/UL
EOSINOPHIL NFR BLD: 0.9 %
ERYTHROCYTE [DISTWIDTH] IN BLOOD BY AUTOMATED COUNT: 16.2 %
EST. GFR  (AFRICAN AMERICAN): 47 ML/MIN/1.73 M^2
EST. GFR  (NON AFRICAN AMERICAN): 41 ML/MIN/1.73 M^2
GLUCOSE SERPL-MCNC: 115 MG/DL
HCT VFR BLD AUTO: 26.8 %
HGB BLD-MCNC: 8.5 G/DL
LYMPHOCYTES # BLD AUTO: 0.6 K/UL
LYMPHOCYTES NFR BLD: 6.8 %
MAGNESIUM SERPL-MCNC: 1.5 MG/DL
MCH RBC QN AUTO: 29.6 PG
MCHC RBC AUTO-ENTMCNC: 31.7 G/DL
MCV RBC AUTO: 93 FL
MONOCYTES # BLD AUTO: 0.9 K/UL
MONOCYTES NFR BLD: 9.3 %
NEUTROPHILS # BLD AUTO: 7.5 K/UL
NEUTROPHILS NFR BLD: 82.6 %
PHOSPHATE SERPL-MCNC: 3 MG/DL
PLATELET # BLD AUTO: 206 K/UL
PMV BLD AUTO: 9.4 FL
POCT GLUCOSE: 132 MG/DL (ref 70–110)
POTASSIUM SERPL-SCNC: 4 MMOL/L
PROT SERPL-MCNC: 6.2 G/DL
RBC # BLD AUTO: 2.87 M/UL
SODIUM SERPL-SCNC: 133 MMOL/L
WBC # BLD AUTO: 9.11 K/UL

## 2017-12-08 PROCEDURE — 27000221 HC OXYGEN, UP TO 24 HOURS

## 2017-12-08 PROCEDURE — 25000003 PHARM REV CODE 250

## 2017-12-08 PROCEDURE — 94799 UNLISTED PULMONARY SVC/PX: CPT

## 2017-12-08 PROCEDURE — 84100 ASSAY OF PHOSPHORUS: CPT

## 2017-12-08 PROCEDURE — 11000001 HC ACUTE MED/SURG PRIVATE ROOM

## 2017-12-08 PROCEDURE — 63600175 PHARM REV CODE 636 W HCPCS: Performed by: FAMILY MEDICINE

## 2017-12-08 PROCEDURE — 25000003 PHARM REV CODE 250: Performed by: FAMILY MEDICINE

## 2017-12-08 PROCEDURE — 97530 THERAPEUTIC ACTIVITIES: CPT

## 2017-12-08 PROCEDURE — 36415 COLL VENOUS BLD VENIPUNCTURE: CPT

## 2017-12-08 PROCEDURE — 63600175 PHARM REV CODE 636 W HCPCS: Performed by: STUDENT IN AN ORGANIZED HEALTH CARE EDUCATION/TRAINING PROGRAM

## 2017-12-08 PROCEDURE — 83735 ASSAY OF MAGNESIUM: CPT

## 2017-12-08 PROCEDURE — 85025 COMPLETE CBC W/AUTO DIFF WBC: CPT

## 2017-12-08 PROCEDURE — 97535 SELF CARE MNGMENT TRAINING: CPT

## 2017-12-08 PROCEDURE — 25000003 PHARM REV CODE 250: Performed by: STUDENT IN AN ORGANIZED HEALTH CARE EDUCATION/TRAINING PROGRAM

## 2017-12-08 PROCEDURE — 94761 N-INVAS EAR/PLS OXIMETRY MLT: CPT

## 2017-12-08 PROCEDURE — 80053 COMPREHEN METABOLIC PANEL: CPT

## 2017-12-08 PROCEDURE — 25000003 PHARM REV CODE 250: Performed by: ORTHOPAEDIC SURGERY

## 2017-12-08 PROCEDURE — 97110 THERAPEUTIC EXERCISES: CPT

## 2017-12-08 RX ORDER — LANOLIN ALCOHOL/MO/W.PET/CERES
400 CREAM (GRAM) TOPICAL 2 TIMES DAILY
Status: COMPLETED | OUTPATIENT
Start: 2017-12-08 | End: 2017-12-10

## 2017-12-08 RX ADMIN — SOLIFENACIN SUCCINATE 10 MG: 5 TABLET, FILM COATED ORAL at 09:12

## 2017-12-08 RX ADMIN — SUCRALFATE 1 G: 1 TABLET ORAL at 06:12

## 2017-12-08 RX ADMIN — BIMATOPROST 1 DROP: 0.1 SOLUTION/ DROPS OPHTHALMIC at 08:12

## 2017-12-08 RX ADMIN — Medication 400 MG: at 08:12

## 2017-12-08 RX ADMIN — AMIODARONE HYDROCHLORIDE 200 MG: 200 TABLET ORAL at 08:12

## 2017-12-08 RX ADMIN — AMIODARONE HYDROCHLORIDE 200 MG: 200 TABLET ORAL at 09:12

## 2017-12-08 RX ADMIN — LISINOPRIL 40 MG: 20 TABLET ORAL at 09:12

## 2017-12-08 RX ADMIN — HYDROCODONE BITARTRATE AND ACETAMINOPHEN 1 TABLET: 5; 325 TABLET ORAL at 03:12

## 2017-12-08 RX ADMIN — PANTOPRAZOLE SODIUM 40 MG: 40 TABLET, DELAYED RELEASE ORAL at 09:12

## 2017-12-08 RX ADMIN — PSYLLIUM HUSK 1 PACKET: 3.4 POWDER ORAL at 09:12

## 2017-12-08 RX ADMIN — MAGNESIUM SULFATE HEPTAHYDRATE 1 G: 500 INJECTION, SOLUTION INTRAMUSCULAR; INTRAVENOUS at 03:12

## 2017-12-08 RX ADMIN — OLOPATADINE HYDROCHLORIDE 1 DROP: 1 SOLUTION/ DROPS OPHTHALMIC at 08:12

## 2017-12-08 RX ADMIN — METOPROLOL SUCCINATE 25 MG: 25 TABLET, EXTENDED RELEASE ORAL at 09:12

## 2017-12-08 RX ADMIN — LEVOTHYROXINE SODIUM 50 MCG: 50 TABLET ORAL at 06:12

## 2017-12-08 RX ADMIN — FUROSEMIDE 20 MG: 20 TABLET ORAL at 09:12

## 2017-12-08 RX ADMIN — SUCRALFATE 1 G: 1 TABLET ORAL at 12:12

## 2017-12-08 RX ADMIN — FERROUS SULFATE TAB EC 325 MG (65 MG FE EQUIVALENT) 325 MG: 325 (65 FE) TABLET DELAYED RESPONSE at 09:12

## 2017-12-08 RX ADMIN — SODIUM PHOSPHATE, DIBASIC AND SODIUM PHOSPHATE, MONOBASIC 1 ENEMA: 7; 19 ENEMA RECTAL at 07:12

## 2017-12-08 RX ADMIN — OLOPATADINE HYDROCHLORIDE 1 DROP: 1 SOLUTION/ DROPS OPHTHALMIC at 09:12

## 2017-12-08 RX ADMIN — HYDROCODONE BITARTRATE AND ACETAMINOPHEN 1 TABLET: 5; 325 TABLET ORAL at 10:12

## 2017-12-08 RX ADMIN — ENOXAPARIN SODIUM 40 MG: 100 INJECTION SUBCUTANEOUS at 04:12

## 2017-12-08 RX ADMIN — FERROUS SULFATE TAB EC 325 MG (65 MG FE EQUIVALENT) 325 MG: 325 (65 FE) TABLET DELAYED RESPONSE at 08:12

## 2017-12-08 RX ADMIN — SUCRALFATE 1 G: 1 TABLET ORAL at 11:12

## 2017-12-08 RX ADMIN — HYDROCODONE BITARTRATE AND ACETAMINOPHEN 1 TABLET: 10; 325 TABLET ORAL at 09:12

## 2017-12-08 NOTE — PLAN OF CARE
Problem: Patient Care Overview  Goal: Plan of Care Review  Outcome: Ongoing (interventions implemented as appropriate)  Encourage patient to use incentive spirometer frequently.  Continue to monitor oxygenation status.

## 2017-12-08 NOTE — PT/OT/SLP PROGRESS
Physical Therapy Treatment    Patient Name:  Alyssa Hastings   MRN:  [id number]    Recommendations:     Discharge Recommendations:  nursing facility, skilled   Discharge Equipment Recommendations: walker, rolling, bedside commode   Barriers to discharge: Decreased caregiver support    Assessment:     Alyssa Hastings is a 87 y.o. female admitted with a medical diagnosis of Closed displaced fracture of right femoral neck.  She presents with the following impairments/functional limitations:  weakness, impaired endurance, impaired self care skills, impaired functional mobilty, gait instability, impaired balance, impaired cognition, decreased upper extremity function, decreased lower extremity function, decreased safety awareness, pain, orthopedic precautions.  Pt would continue to benefit from P.T. To assist pt's return to PLOF.    Rehab Prognosis:  Good; patient would benefit from acute skilled PT services to address these deficits and reach maximum level of function.      Recent Surgery: Procedure(s) (LRB):  HEMIARTHROPLASTY - RIGHT HIP (Right) 3 Days Post-Op    Plan:     During this hospitalization, patient to be seen BID to address the above listed problems via gait training, therapeutic activities, therapeutic exercises  · Plan of Care Expires:  01/06/18   Plan of Care Reviewed with: patient    Subjective     Communicated with RN (Maryellen) prior to session.  Patient found L sidelying upon PT entry to room, agreeable to treatment.        Patient comments: Pt agreed to tx.    Pain/Comfort:  · Pain Rating 1: 5/10  · Location - Side 1: Right  · Location - Orientation 1: generalized  · Location 1:  (hip)  · Pain Addressed 1: Pre-medicate for activity, Distraction, Reposition  · Pain Rating Post-Intervention 1: 5/10    Patients cultural, spiritual, Muslim conflicts given the current situation: None verbalized to PT    Objective:     Patient found with: telemetry, oxygen     General Precautions: Standard, fall    Orthopedic Precautions:RLE weight bearing as tolerated, RLE posterior precautions   Braces: N/A     Functional Mobility:  · Bed Mobility:     · Rolling Left:  contact guard assistance, minimum assistance and use of B/R  · Rolling Right: contact guard assistance, minimum assistance and use of B/R  · Scooting: contact guard assistance and minimum assistance to EOB with increased time and vc's to perform  · Supine to Sit: moderate assistance and maximal assistance with pt in long sit and assist for balance and moving RLE.  VC's for sequencing with increased time to perform.  · Sit to Supine: moderate assistance and maximal assistance  · Transfers:     · Sit to Stand:  minimum assistance with rolling walker x 3 reps.  · Gait: 3 side steps with RW and Min A/vc's for sequencing x 2 rest with seated rest break between bouts.  · Balance: sitting Good. Standing Fair-      AM-PAC 6 CLICK MOBILITY  Turning over in bed (including adjusting bedclothes, sheets and blankets)?: 3  Sitting down on and standing up from a chair with arms (e.g., wheelchair, bedside commode, etc.): 2  Moving from lying on back to sitting on the side of the bed?: 2  Moving to and from a bed to a chair (including a wheelchair)?: 2  Need to walk in hospital room?: 2  Climbing 3-5 steps with a railing?: 1  Total Score: 12       Therapeutic Activities and Exercises:   Bed mobility as above to assist pt on bed pan to urinate.  Pt needed Max assist for cleaning secondary to not being able to reach.  Pt stood EOB x 1 to 2 mins x 3 reps with RW and CGA/Louisa.  While standing, pt needed to use bed pan for BM.  Smaller Bed johnson was placed EOB for pt to sit on.  Pt required Total A for cleaning with CGA/Louisa with Rw for standing balance.  AMB: as above.    Patient left supine with all lines intact, call button in reach, bed alarm on and RN notified..    GOALS:    Physical Therapy Goals        Problem: Physical Therapy Goal    Goal Priority Disciplines Outcome Goal  Variances Interventions   Physical Therapy Goal     PT/OT, PT Ongoing (interventions implemented as appropriate)     Description:  Goals to be met by: 18     Patient will increase functional independence with mobility by performin) sit <>sup with SBA  2) Sit <>stand with CGA X1  3) ambulate 75 feet with CGA X1                    Time Tracking:     PT Received On: 17  PT Start Time: 1300     PT Stop Time: 1347  PT Total Time (min): 47 min     Billable Minutes:  Therapeutic Activity 47  Treatment Type: Treatment  PT/PTA: PTA     PTA Visit Number: 1     Ayanna Tillman PTA  2017

## 2017-12-08 NOTE — PT/OT/SLP PROGRESS
Occupational Therapy   Treatment    Name: Alyssa Hastings  MRN: [id number]  Admitting Diagnosis:  Closed displaced fracture of right femoral neck  3 Days Post-Op    Recommendations:     Discharge Recommendations: nursing facility, skilled  Discharge Equipment Recommendations:  walker, rolling, bedside commode  Barriers to discharge:  Decreased caregiver support    Subjective     Communicated with: Maryellen masterson prior to session.  Pain/Comfort:  · Pain Rating 1: 5/10  · Location - Side 1: Right  · Location - Orientation 1: generalized  · Location 1: hip  · Pain Addressed 1: Reposition, Distraction, Cessation of Activity  · Pain Rating Post-Intervention 1: 4/10    Objective:     Patient found with: telemetry, peripheral IV    General Precautions: Standard, fall   Orthopedic Precautions:RLE posterior precautions, RLE weight bearing as tolerated   Braces: N/A     Bed Mobility:    · Patient completed Scooting/Bridging with minimum assistance  · Patient completed Supine to Sit with completed with PT prior to CARBAJAL arrival  · Patient completed Sit to Supine with maximal assistance     Activities of Daily Living:  · Grooming: minimum assistance oral care EOB - min (A) to rinse dentures at sink, but SBA for all other G/H tasks    Patient left HOB elevated with all lines intact, call button in reach, bed alarm on and RN notified    [facility] [address] 6 Click:  [facility] [address] Total Score: 15    Treatment & Education:  Patient completed ADL tasks as above. BUE AROM therex, 2 x 10 reps: bicep curls, sh flexion, horizontal sh abd/add, punches. Instructed on pursed lip breathing. Pillow placed between knees.  Education:    Assessment:   Patient with good participation. Will benefit from skilled OT to address functional deficits.     Alyssa Hastings is a 87 y.o. female with a medical diagnosis of Closed displaced fracture of right femoral neck.  Performance deficits affecting function are weakness, impaired endurance, impaired self care skills, impaired  functional mobilty, gait instability, decreased lower extremity function, impaired balance, pain, orthopedic precautions, decreased safety awareness, decreased ROM, impaired cardiopulmonary response to activity.      Rehab Prognosis:  Fair+; patient would benefit from acute skilled OT services to address these deficits and reach maximum level of function.       Plan:     Patient to be seen 5 x/week to address the above listed problems via self-care/home management, therapeutic activities, therapeutic exercises  · Plan of Care Expires: 01/06/18  · Plan of Care Reviewed with: patient    This Plan of care has been discussed with the patient who was involved in its development and understands and is in agreement with the identified goals and treatment plan    GOALS:    Occupational Therapy Goals        Problem: Occupational Therapy Goal    Goal Priority Disciplines Outcome Interventions   Occupational Therapy Goal     OT, PT/OT Ongoing (interventions implemented as appropriate)    Description:  Goals to be met by: 01/06/18     Patient will increase functional independence with ADLs by performing:    LE Dressing with Moderate Assistance.  Grooming while standing at sink with Stand-by Assistance.  Toileting from toilet with Stand-by Assistance for hygiene and clothing management.   Toilet transfer to toilet with Stand-by Assistance.  Upper extremity exercise program x10 reps per handout, with independence.                      Time Tracking:     OT Date of Treatment: 12/08/17  OT Start Time: 1125  OT Stop Time: 1155  OT Total Time (min): 30 min    Billable Minutes:Self Care/Home Management 10  Therapeutic Exercise 20    DESMOND Domingo  12/8/2017

## 2017-12-08 NOTE — PROGRESS NOTES
Interval:   NAEO. Appropriate post-op pain. Denies numbness/paresthesias. Awaiting SNF placement.    Vitals:  Temp:  [97.1 °F (36.2 °C)-98.9 °F (37.2 °C)] 97.1 °F (36.2 °C)  Pulse:  [70-81] 73  Resp:  [16-18] 18  SpO2:  [94 %-99 %] 97 %  BP: (123-148)/(58-83) 127/58    Scheduled Meds:    amiodarone  200 mg Oral BID    bimatoprost  1 drop Both Eyes QHS    enoxaparin  40 mg Subcutaneous Daily    ferrous sulfate  325 mg Oral BID    furosemide  20 mg Oral Daily    levothyroxine  50 mcg Oral Before breakfast    lisinopril  40 mg Oral Daily    metoprolol succinate  25 mg Oral Daily    mupirocin  1 g Nasal BID    olopatadine  1 drop Both Eyes BID    pantoprazole  40 mg Oral Daily    psyllium  1 packet Oral Daily    solifenacin  10 mg Oral Daily    sucralfate  1 g Oral QID     Continuous Infusions:    PRN Meds: acetaminophen, acetaminophen, dextrose 50%, dextrose 50%, diphenhydrAMINE-zinc acetate 1-0.1%, docusate sodium, glucagon (human recombinant), glucose, glucose, hydrALAZINE, hydrocodone-acetaminophen 10-325mg, hydrocodone-acetaminophen 5-325mg, insulin aspart, ipratropium, morphine, ondansetron, sodium chloride 0.9%, sodium chloride 0.9%, traMADol    Diet: Diet Adult Regular  Diet Cardiac  Diet general  Diet renal  Diet Cardiac    Trended Lab Data:    Recent Labs  Lab 12/05/17  0430 12/06/17  0547 12/06/17  0814 12/07/17  0435   WBC 6.56  --  10.29 8.86   HGB 8.6*  --  8.4* 7.4*   HCT 26.3*  --  26.7* 23.5*     --  179 162   MCV 91  --  94 95   RDW 15.9*  --  16.1* 16.3*   * 134*  --  133*   K 3.4* 3.9  --  3.7   CL 98 100  --  101   CO2 29 25  --  27   BUN 17 20  --  27*   CREATININE 0.8 1.0  --  1.3    119*  --  113*   PROT 6.0 6.0  --  5.6*   ALBUMIN 2.4* 2.4*  --  2.2*   BILITOT 0.8 0.8  --  0.7   AST 79* 71*  --  74*   ALKPHOS 70 73  --  66   ALT 51* 42  --  36       I/O last 3 completed shifts:  In: 500 [IV Piggyback:500]  Out: 614 [Urine:614]    Exam:  Gen NAD  Resp  unlabored  CV RR  RLE:  Dressing c/d/i  EHL/FHL/TA/GS intact  SILT s/s/sp/dp/t  BCR    Impression/Plan:  Alyssa Hastings is a 87 y.o. female with a right femoral neck fracture s/p R hip hemiarthroplasty on 12/5  - WBAT RLE, posterior hip precautions  - PT/OT  - medical management per primary  - recommend DVT ppx upon discharge (ASA 81BID + pepcid 20 BID for 4 weeks)  - awaiting SNF placement        Kenton Crawford  PGY-3  [employer] [facility]

## 2017-12-08 NOTE — PLAN OF CARE
Problem: Patient Care Overview  Goal: Plan of Care Review  Pt received on documented flow, no respiratory noted. Will cont to monitor.

## 2017-12-08 NOTE — PT/OT/SLP PROGRESS
Physical Therapy Treatment    Patient Name:  Alyssa Hastings   MRN:  [id number]    Recommendations:     Discharge Recommendations:  nursing facility, basic   Discharge Equipment Recommendations: walker, rolling, bedside commode   Barriers to discharge: Decreased caregiver support    Assessment:     Alyssa Hastings is a 87 y.o. female admitted with a medical diagnosis of Closed displaced fracture of right femoral neck.  She presents with the following impairments/functional limitations:  weakness, impaired self care skills, impaired functional mobilty, impaired balance, gait instability, decreased upper extremity function, decreased lower extremity function, pain, decreased ROM, impaired endurance, decreased safety awareness, edema, impaired skin Patient with slowly improving functional mobility 1st attempt with sit<> stand min to mod assist.    Rehab Prognosis: Fair+; patient would benefit from acute skilled PT services to address these deficits and reach maximum level of function.      Recent Surgery: Procedure(s) (LRB):  HEMIARTHROPLASTY - RIGHT HIP (Right) 3 Days Post-Op    Plan:     During this hospitalization, patient to be seen BID to address the above listed problems via gait training, therapeutic activities, therapeutic exercises  · Plan of Care Expires:  01/06/18   Plan of Care Reviewed with: patient, daughter    Subjective     Communicated with primary nurse prior to session.  Patient found  Supine  upon PT entry to room, agreeable to treatment.      Chief Complaint: pain and weakness  Patient comments/goals: go home  Pain/Comfort:  · Pain Rating 1: 5/10  · Location - Side 1: Right  · Location 1: hip  · Pain Addressed 1: Distraction, Reposition, Pre-medicate for activity    Patients cultural, spiritual, Catholic conflicts given the current situation: None verbalized to PT    Objective:     Patient found with: telemetry, oxygen     General Precautions: Standard, fall   Orthopedic Precautions:RLE weight  bearing as tolerated, RLE posterior precautions   Braces: N/A     Functional Mobility:  · Bed Mobility:     · Supine to Sit: maximal assistance  · Transfers:     · Sit to Stand:  minimum assistance and moderate assistance with rolling walker  · Gait: unable to take sufficient steps with +1 assist pain upon standing R LE      AM-PAC 6 CLICK MOBILITY  Turning over in bed (including adjusting bedclothes, sheets and blankets)?: 3  Sitting down on and standing up from a chair with arms (e.g., wheelchair, bedside commode, etc.): 2  Moving from lying on back to sitting on the side of the bed?: 2  Moving to and from a bed to a chair (including a wheelchair)?: 2  Need to walk in hospital room?: 2  Climbing 3-5 steps with a railing?: 1  Total Score: 12       Therapeutic Activities and Exercises:   Sat EOB performed seated LAQ,ankle pumps and pillow squeezes    Patient left seated EOB  with all lines intact, call button in reach and CARBAJAL presnet present..    GOALS:    Physical Therapy Goals        Problem: Physical Therapy Goal    Goal Priority Disciplines Outcome Goal Variances Interventions   Physical Therapy Goal     PT/OT, PT Ongoing (interventions implemented as appropriate)     Description:  Goals to be met by: 18     Patient will increase functional independence with mobility by performin) sit <>sup with SBA  2) Sit <>stand with CGA X1  3) ambulate 75 feet with CGA X1                    Time Tracking:     PT Received On: 17  PT Start Time: 1105     PT Stop Time: 1125  PT Total Time (min): 20 min     Billable Minutes: Therapeutic Exercise 20    Treatment Type: Treatment  PT/PTA: PT     PTA Visit Number: 1     Kenton Mendez, PT  2017

## 2017-12-08 NOTE — PHYSICIAN QUERY
PT Name: Alyssa Hastings  MR #: [id number]    Physician Query Form - Consultant Diagnosis Clarification     CDS/: Yoselin Ramirez               Contact information: [phone]  This form is a permanent document in the medical record.     Query Date: December 8, 2017      By submitting this query, we are merely seeking further clarification of documentation.  Please utilize your independent clinical judgment when addressing the question(s) below.      The Medical record contains the following:   Diagnosis Supporting Clinical Information Location in Medical Record     Right inferior pubic ramus fracture   Acute displaced fracture of the right inferior pubic ramus.    12/03/2017XRay  Results          Do you agree with the Consultants diagnosis of _Acute displaced fracture of the right inferior pubic ramus__________________________________?                 [Y]   Yes               [   ]   Yes, but it resolved prior to my assessment of the patient               [   ]   No                [   ]   Clinically insignificant               [   ]   Clinically undetermined               [   ]   Other/Clarification of findings: ___________________________________________

## 2017-12-08 NOTE — PLAN OF CARE
Problem: Occupational Therapy Goal  Goal: Occupational Therapy Goal  Goals to be met by: 01/06/18     Patient will increase functional independence with ADLs by performing:    LE Dressing with Moderate Assistance.  Grooming while standing at sink with Stand-by Assistance.  Toileting from toilet with Stand-by Assistance for hygiene and clothing management.   Toilet transfer to toilet with Stand-by Assistance.  Upper extremity exercise program x10 reps per handout, with independence.     Outcome: Ongoing (interventions implemented as appropriate)  Patient with good participation. Will benefit from skilled OT to address functional deficits.

## 2017-12-08 NOTE — PLAN OF CARE
Problem: Patient Care Overview  Goal: Plan of Care Review  Outcome: Ongoing (interventions implemented as appropriate)  Safety maintained   Iv bolus given after attaining new iv access   Given pain medication x one orally prior to surgery    Noted confusion with rapid reorientation as evening progressed.  Dressing to left leg remain intact with lower dressing with old drainage   Telemetry continue in sinus rhythm

## 2017-12-08 NOTE — PLAN OF CARE
Problem: Physical Therapy Goal  Goal: Physical Therapy Goal  Goals to be met by: 18     Patient will increase functional independence with mobility by performin) sit <>sup with SBA  2) Sit <>stand with CGA X1  3) ambulate 75 feet with CGA X1   Outcome: Ongoing (interventions implemented as appropriate)  Recommend SNf  Slowly improving functional mobility and decreasing pain complaints

## 2017-12-08 NOTE — PLAN OF CARE
Problem: Patient Care Overview  Goal: Plan of Care Review  Outcome: Ongoing (interventions implemented as appropriate)  PT AAOx3 with disorientation to place at times. Respirations even, unlabored on 1L N/C sats @ 95%. Pt denies pain. Repositioning pt q2h. Encouraged pt to call for assistance. Call light within reach. No distress noted. Safety maintained. Will continue to monitor.

## 2017-12-09 LAB
ALBUMIN SERPL BCP-MCNC: 2.1 G/DL
ALP SERPL-CCNC: 67 U/L
ALT SERPL W/O P-5'-P-CCNC: 28 U/L
ANION GAP SERPL CALC-SCNC: 7 MMOL/L
AST SERPL-CCNC: 48 U/L
BASOPHILS # BLD AUTO: 0.02 K/UL
BASOPHILS NFR BLD: 0.3 %
BILIRUB SERPL-MCNC: 0.7 MG/DL
BUN SERPL-MCNC: 27 MG/DL
CALCIUM SERPL-MCNC: 8.2 MG/DL
CHLORIDE SERPL-SCNC: 100 MMOL/L
CO2 SERPL-SCNC: 25 MMOL/L
CREAT SERPL-MCNC: 1 MG/DL
DIFFERENTIAL METHOD: ABNORMAL
EOSINOPHIL # BLD AUTO: 0.2 K/UL
EOSINOPHIL NFR BLD: 2.6 %
ERYTHROCYTE [DISTWIDTH] IN BLOOD BY AUTOMATED COUNT: 16.3 %
EST. GFR  (AFRICAN AMERICAN): 59 ML/MIN/1.73 M^2
EST. GFR  (NON AFRICAN AMERICAN): 51 ML/MIN/1.73 M^2
GLUCOSE SERPL-MCNC: 105 MG/DL
HCT VFR BLD AUTO: 23.6 %
HGB BLD-MCNC: 7.9 G/DL
LYMPHOCYTES # BLD AUTO: 0.6 K/UL
LYMPHOCYTES NFR BLD: 10.1 %
MAGNESIUM SERPL-MCNC: 1.6 MG/DL
MCH RBC QN AUTO: 30.6 PG
MCHC RBC AUTO-ENTMCNC: 33.5 G/DL
MCV RBC AUTO: 92 FL
MONOCYTES # BLD AUTO: 0.7 K/UL
MONOCYTES NFR BLD: 11 %
NEUTROPHILS # BLD AUTO: 4.6 K/UL
NEUTROPHILS NFR BLD: 75.7 %
PHOSPHATE SERPL-MCNC: 2.6 MG/DL
PLATELET # BLD AUTO: 209 K/UL
PMV BLD AUTO: 9.4 FL
POTASSIUM SERPL-SCNC: 3.4 MMOL/L
PROT SERPL-MCNC: 5.6 G/DL
RBC # BLD AUTO: 2.58 M/UL
SODIUM SERPL-SCNC: 132 MMOL/L
WBC # BLD AUTO: 6.11 K/UL

## 2017-12-09 PROCEDURE — 25000003 PHARM REV CODE 250: Performed by: ORTHOPAEDIC SURGERY

## 2017-12-09 PROCEDURE — 36415 COLL VENOUS BLD VENIPUNCTURE: CPT

## 2017-12-09 PROCEDURE — 25000003 PHARM REV CODE 250: Performed by: FAMILY MEDICINE

## 2017-12-09 PROCEDURE — 99900035 HC TECH TIME PER 15 MIN (STAT)

## 2017-12-09 PROCEDURE — 25000003 PHARM REV CODE 250

## 2017-12-09 PROCEDURE — 94761 N-INVAS EAR/PLS OXIMETRY MLT: CPT

## 2017-12-09 PROCEDURE — 25000003 PHARM REV CODE 250: Performed by: STUDENT IN AN ORGANIZED HEALTH CARE EDUCATION/TRAINING PROGRAM

## 2017-12-09 PROCEDURE — 94799 UNLISTED PULMONARY SVC/PX: CPT

## 2017-12-09 PROCEDURE — 83735 ASSAY OF MAGNESIUM: CPT

## 2017-12-09 PROCEDURE — 63600175 PHARM REV CODE 636 W HCPCS: Performed by: FAMILY MEDICINE

## 2017-12-09 PROCEDURE — 80053 COMPREHEN METABOLIC PANEL: CPT

## 2017-12-09 PROCEDURE — 84100 ASSAY OF PHOSPHORUS: CPT

## 2017-12-09 PROCEDURE — 97116 GAIT TRAINING THERAPY: CPT

## 2017-12-09 PROCEDURE — 11000001 HC ACUTE MED/SURG PRIVATE ROOM

## 2017-12-09 PROCEDURE — 63600175 PHARM REV CODE 636 W HCPCS: Performed by: STUDENT IN AN ORGANIZED HEALTH CARE EDUCATION/TRAINING PROGRAM

## 2017-12-09 PROCEDURE — 97530 THERAPEUTIC ACTIVITIES: CPT

## 2017-12-09 PROCEDURE — 85025 COMPLETE CBC W/AUTO DIFF WBC: CPT

## 2017-12-09 RX ORDER — SODIUM,POTASSIUM PHOSPHATES 280-250MG
1 POWDER IN PACKET (EA) ORAL
Status: DISCONTINUED | OUTPATIENT
Start: 2017-12-09 | End: 2017-12-12 | Stop reason: HOSPADM

## 2017-12-09 RX ORDER — MAGNESIUM SULFATE HEPTAHYDRATE 40 MG/ML
2 INJECTION, SOLUTION INTRAVENOUS ONCE
Status: COMPLETED | OUTPATIENT
Start: 2017-12-09 | End: 2017-12-09

## 2017-12-09 RX ADMIN — POTASSIUM & SODIUM PHOSPHATES POWDER PACK 280-160-250 MG 1 PACKET: 280-160-250 PACK at 08:12

## 2017-12-09 RX ADMIN — FERROUS SULFATE TAB EC 325 MG (65 MG FE EQUIVALENT) 325 MG: 325 (65 FE) TABLET DELAYED RESPONSE at 08:12

## 2017-12-09 RX ADMIN — FUROSEMIDE 20 MG: 20 TABLET ORAL at 09:12

## 2017-12-09 RX ADMIN — PSYLLIUM HUSK 1 PACKET: 3.4 POWDER ORAL at 09:12

## 2017-12-09 RX ADMIN — MUPIROCIN 1 G: 20 OINTMENT TOPICAL at 08:12

## 2017-12-09 RX ADMIN — SUCRALFATE 1 G: 1 TABLET ORAL at 05:12

## 2017-12-09 RX ADMIN — OLOPATADINE HYDROCHLORIDE 1 DROP: 1 SOLUTION/ DROPS OPHTHALMIC at 08:12

## 2017-12-09 RX ADMIN — FERROUS SULFATE TAB EC 325 MG (65 MG FE EQUIVALENT) 325 MG: 325 (65 FE) TABLET DELAYED RESPONSE at 09:12

## 2017-12-09 RX ADMIN — MUPIROCIN 1 G: 20 OINTMENT TOPICAL at 09:12

## 2017-12-09 RX ADMIN — Medication 400 MG: at 09:12

## 2017-12-09 RX ADMIN — METOPROLOL SUCCINATE 25 MG: 25 TABLET, EXTENDED RELEASE ORAL at 09:12

## 2017-12-09 RX ADMIN — BIMATOPROST 1 DROP: 0.1 SOLUTION/ DROPS OPHTHALMIC at 08:12

## 2017-12-09 RX ADMIN — AMIODARONE HYDROCHLORIDE 200 MG: 200 TABLET ORAL at 09:12

## 2017-12-09 RX ADMIN — SUCRALFATE 1 G: 1 TABLET ORAL at 11:12

## 2017-12-09 RX ADMIN — ENOXAPARIN SODIUM 40 MG: 100 INJECTION SUBCUTANEOUS at 05:12

## 2017-12-09 RX ADMIN — PANTOPRAZOLE SODIUM 40 MG: 40 TABLET, DELAYED RELEASE ORAL at 09:12

## 2017-12-09 RX ADMIN — Medication 400 MG: at 08:12

## 2017-12-09 RX ADMIN — OLOPATADINE HYDROCHLORIDE 1 DROP: 1 SOLUTION/ DROPS OPHTHALMIC at 09:12

## 2017-12-09 RX ADMIN — HYDROCODONE BITARTRATE AND ACETAMINOPHEN 1 TABLET: 5; 325 TABLET ORAL at 09:12

## 2017-12-09 RX ADMIN — ACETAMINOPHEN 650 MG: 325 TABLET ORAL at 02:12

## 2017-12-09 RX ADMIN — LEVOTHYROXINE SODIUM 50 MCG: 50 TABLET ORAL at 05:12

## 2017-12-09 RX ADMIN — AMIODARONE HYDROCHLORIDE 200 MG: 200 TABLET ORAL at 08:12

## 2017-12-09 RX ADMIN — LISINOPRIL 40 MG: 20 TABLET ORAL at 09:12

## 2017-12-09 RX ADMIN — POTASSIUM & SODIUM PHOSPHATES POWDER PACK 280-160-250 MG 1 PACKET: 280-160-250 PACK at 11:12

## 2017-12-09 RX ADMIN — POTASSIUM & SODIUM PHOSPHATES POWDER PACK 280-160-250 MG 1 PACKET: 280-160-250 PACK at 04:12

## 2017-12-09 RX ADMIN — HYDROCODONE BITARTRATE AND ACETAMINOPHEN 1 TABLET: 5; 325 TABLET ORAL at 08:12

## 2017-12-09 RX ADMIN — SOLIFENACIN SUCCINATE 10 MG: 5 TABLET, FILM COATED ORAL at 09:12

## 2017-12-09 RX ADMIN — MAGNESIUM SULFATE IN WATER 2 G: 40 INJECTION, SOLUTION INTRAVENOUS at 09:12

## 2017-12-09 NOTE — PLAN OF CARE
Problem: Physical Therapy Goal  Goal: Physical Therapy Goal  Goals to be met by: 18     Patient will increase functional independence with mobility by performin) sit <>sup with SBA  2) Sit <>stand with CGA X1  3) ambulate 75 feet with CGA X1   Outcome: Ongoing (interventions implemented as appropriate)    Pt continues to work and slowly progress toward established goals.

## 2017-12-09 NOTE — PLAN OF CARE
Problem: Patient Care Overview  Goal: Plan of Care Review  Outcome: Ongoing (interventions implemented as appropriate)  Pt on room air with SpO2 95 %. No respiratory distress noted. Will continue to monitor.

## 2017-12-09 NOTE — PT/OT/SLP PROGRESS
Physical Therapy Treatment    Patient Name:  Alyssa Hastings   MRN:  [id number]    Recommendations:     Discharge Recommendations:  nursing facility, basic   Discharge Equipment Recommendations: walker, rolling, bedside commode   Barriers to discharge: Decreased caregiver support    Assessment:     Alyssa Hastings is a 87 y.o. female admitted with a medical diagnosis of Closed displaced fracture of right femoral neck.  She presents with the following impairments/functional limitations:  weakness, impaired endurance, impaired self care skills, impaired functional mobilty, gait instability, impaired balance, decreased upper extremity function, decreased lower extremity function, decreased safety awareness, pain, orthopedic precautions. Pt able to slightly increase steps covered in ambulation training. Required one seated rest break of ~1 1/2 minutes. SpO2 level taken immediately after ambulation secondary to labored breathing and level fluctuated between 89-94 on room air. Very cooperative with treatment requests. Will benefit from continued PT to achieve all goals established.    Rehab Prognosis:  good; patient would benefit from acute skilled PT services to address these deficits and reach maximum level of function.      Recent Surgery: Procedure(s) (LRB):  HEMIARTHROPLASTY - RIGHT HIP (Right) 4 Days Post-Op    Plan:     During this hospitalization, patient to be seen BID to address the above listed problems via gait training, therapeutic activities, therapeutic exercises  · Plan of Care Expires:  01/06/18   Plan of Care Reviewed with: patient    Subjective     Communicated with nurse prior to session.  Patient found supine upon PT entry to room, agreeable to treatment.      Chief Complaint:   Patient comments/goals:   Pain/Comfort:  · Pain Rating 1: 9/10  · Location - Side 1: Right  · Location - Orientation 1: generalized  · Location 1: hip  · Pain Addressed 1: Cessation of Activity, Distraction  · Pain Rating  Post-Intervention 1:  (did not rate)    Patients cultural, spiritual, Lutheran conflicts given the current situation: None reported    Objective:     Patient found with: telemetry, oxygen     General Precautions: Standard, fall   Orthopedic Precautions:RLE weight bearing as tolerated, RLE posterior precautions   Braces: N/A     Functional Mobility:  · Bed Mobility:     · Rolling Left:  contact guard assistance and minimum assistance  · Scooting: stand by assistance  · Supine to Sit: minimum assistance  · Sit to Supine: minimum assistance  · Transfers:     · Sit to Stand:  minimum assistance and verbal cueing with rolling walker  · Gait: 2 trials. 1st trial ~10 steps and 2nd trial ~6 steps with RW and min A plus constant verbal cueing for proper sequencing and RLE WBAT status      AM-PAC 6 CLICK MOBILITY  Turning over in bed (including adjusting bedclothes, sheets and blankets)?: 3  Sitting down on and standing up from a chair with arms (e.g., wheelchair, bedside commode, etc.): 3  Moving from lying on back to sitting on the side of the bed?: 3  Moving to and from a bed to a chair (including a wheelchair)?: 2  Need to walk in hospital room?: 2  Climbing 3-5 steps with a railing?: 1  Total Score: 14       Therapeutic Activities and Exercises:  All transfers with assistance as documented above. Pt performed 2 sit to stand trials, 1st trial from EOB and 2nd trial from bedside chair with RW and min A. Ambulation training by 2 trials. 1st trial ~10 steps to sit in bedside chair secondary to pt stating she could not go any more she needed to sit. Sat in bedside chair ~1-1/2 minutes. 2nd ambulation training ~6 steps back to bed (chair brought closer to bed secondary to pt request).  Required verbal cueing during ambulation for proper sequencing and RLE WBAT status. Returned to supine.    Patient left supine with all lines intact, call button in reach, bed alarm off and nursing notified..    GOALS:    Physical Therapy  Goals        Problem: Physical Therapy Goal    Goal Priority Disciplines Outcome Goal Variances Interventions   Physical Therapy Goal     PT/OT, PT Ongoing (interventions implemented as appropriate)     Description:  Goals to be met by: 18     Patient will increase functional independence with mobility by performin) sit <>sup with SBA  2) Sit <>stand with CGA X1  3) ambulate 75 feet with CGA X1                    Time Tracking:     PT Received On: 17  PT Start Time: 1031     PT Stop Time: 1113  PT Total Time (min): 42 min     Billable Minutes: Gait Training  25 and Therapeutic Activity  17    Treatment Type: Treatment  PT/PTA: PTA     PTA Visit Number: 2     Tova Pino, PTA  2017

## 2017-12-09 NOTE — PROGRESS NOTES
Interval:   NAEO. Appropriate post-op pain. Denies numbness/paresthesias. Awaiting SNF placement.    Vitals:  Temp:  [97.1 °F (36.2 °C)-98.5 °F (36.9 °C)] 98.5 °F (36.9 °C)  Pulse:  [75-79] 75  Resp:  [17-20] 17  SpO2:  [93 %-99 %] 99 %  BP: (140-186)/(60-77) 167/71    Scheduled Meds:    amiodarone  200 mg Oral BID    bimatoprost  1 drop Both Eyes QHS    enoxaparin  40 mg Subcutaneous Daily    ferrous sulfate  325 mg Oral BID    furosemide  20 mg Oral Daily    levothyroxine  50 mcg Oral Before breakfast    lisinopril  40 mg Oral Daily    magnesium oxide  400 mg Oral BID    metoprolol succinate  25 mg Oral Daily    mupirocin  1 g Nasal BID    olopatadine  1 drop Both Eyes BID    pantoprazole  40 mg Oral Daily    potassium, sodium phosphates  1 packet Oral QID (AC & HS)    psyllium  1 packet Oral Daily    solifenacin  10 mg Oral Daily    sucralfate  1 g Oral QID     Continuous Infusions:    PRN Meds: acetaminophen, acetaminophen, dextrose 50%, dextrose 50%, diphenhydrAMINE-zinc acetate 1-0.1%, docusate sodium, glucagon (human recombinant), glucose, glucose, hydrALAZINE, hydrocodone-acetaminophen 10-325mg, hydrocodone-acetaminophen 5-325mg, insulin aspart, ipratropium, morphine, ondansetron, sodium chloride 0.9%, sodium chloride 0.9%, traMADol    Diet: Diet Adult Regular  Diet Cardiac  Diet general  Diet renal  Diet Cardiac    Trended Lab Data:    Recent Labs  Lab 12/07/17  0435 12/08/17  1332 12/08/17  1333 12/09/17  0815 12/09/17  0816   WBC 8.86  --  9.11  --  6.11   HGB 7.4*  --  8.5*  --  7.9*   HCT 23.5*  --  26.8*  --  23.6*     --  206  --  209   MCV 95  --  93  --  92   RDW 16.3*  --  16.2*  --  16.3*   * 133*  --  132*  --    K 3.7 4.0  --  3.4*  --     99  --  100  --    CO2 27 23  --  25  --    BUN 27* 30*  --  27*  --    CREATININE 1.3 1.2  --  1.0  --    * 115*  --  105  --    PROT 5.6* 6.2  --  5.6*  --    ALBUMIN 2.2* 2.3*  --  2.1*  --    BILITOT 0.7 0.8  --   0.7  --    AST 74* 64*  --  48*  --    ALKPHOS 66 72  --  67  --    ALT 36 34  --  28  --        I/O last 3 completed shifts:  In: -   Out: 921 [Urine:921]    Exam:  Gen NAD  Resp unlabored  CV RR  RLE:  Dressing c/d/i  EHL/FHL/TA/GS intact  SILT s/s/sp/dp/t  BCR    Impression/Plan:  Alyssa LÓPEZ Trentonlanden is a 87 y.o. female with a right femoral neck fracture s/p R hip hemiarthroplasty on 12/5  - WBAT RLE, posterior hip precautions  - PT/OT  - medical management per primary  - recommend DVT ppx upon discharge (ASA 81BID + pepcid 20 BID for 4 weeks)  - awaiting SNF placement

## 2017-12-09 NOTE — PLAN OF CARE
Problem: Patient Care Overview  Goal: Plan of Care Review  Plan of care reviewed with patient. Dressing to right hip incision is CDI. Pt c/o mild pain to site, treated with PRN oral norco. Pt pain resolved and now resting with eyes closed. Large medication (carafate) was crushed and given in pudding as pt stated that she would choke if she tried to swallow said pill. Pt assisted with changing position and encouraged to use call light to voice any needs.

## 2017-12-09 NOTE — PLAN OF CARE
Problem: Patient Care Overview  Goal: Plan of Care Review  Outcome: Ongoing (interventions implemented as appropriate)  Pt on room air with SpO2  94%. No respiratory distress noted. Will continue to monitor.

## 2017-12-09 NOTE — PROGRESS NOTES
TN spoke with Luis, pt's son after touring. Family has chosen [facility] for skilled. Clint with  [facility] has reached out to [facility] for insurance approval.       Update: 2pm     Per Clint, [facility] auth still pending, Clint to follow up on Monday.     TN updated Son, Luis of above.  Luis inquiring if coverage is same at [facility] vs [facility].  TN referred Luis to call Patient's insurance ([employer])to clarify cost and coverage of skilled days.        Dr. Olivia updated of above.    Future Appointments  Date Time Provider Department Center   12/12/2017 9:00 AM HEARING AIDS, [facility] HEAR [facility]   1/10/2018 2:30 PM Isaias Elizabeth MD [facility] OPHTHAL [facility]

## 2017-12-09 NOTE — PROGRESS NOTES
Progress Note    Admit Date: 12/3/2017   LOS: 6 days     SUBJECTIVE:     NAEON, patient reports sleeping well. Pain well controlled. 3x BM's yesterday. Urinating without issue. Continues to work with PT/OT.    Scheduled Meds:   amiodarone  200 mg Oral BID    bimatoprost  1 drop Both Eyes QHS    enoxaparin  40 mg Subcutaneous Daily    ferrous sulfate  325 mg Oral BID    furosemide  20 mg Oral Daily    levothyroxine  50 mcg Oral Before breakfast    lisinopril  40 mg Oral Daily    magnesium oxide  400 mg Oral BID    metoprolol succinate  25 mg Oral Daily    mupirocin  1 g Nasal BID    olopatadine  1 drop Both Eyes BID    pantoprazole  40 mg Oral Daily    psyllium  1 packet Oral Daily    solifenacin  10 mg Oral Daily    sucralfate  1 g Oral QID     Continuous Infusions:   PRN Meds:acetaminophen, acetaminophen, dextrose 50%, dextrose 50%, diphenhydrAMINE-zinc acetate 1-0.1%, docusate sodium, glucagon (human recombinant), glucose, glucose, hydrALAZINE, hydrocodone-acetaminophen 10-325mg, hydrocodone-acetaminophen 5-325mg, insulin aspart, ipratropium, morphine, ondansetron, sodium chloride 0.9%, sodium chloride 0.9%, traMADol    Review of patient's allergies indicates:   Allergen Reactions    Actos [pioglitazone]     Amlodipine     Bentyl [dicyclomine]     Benzocaine     Declomycin [demeclocycline]     Doxycycline     Erythropoietin analogues     Gabapentin     Hydrocodone     Lipitor [atorvastatin]     Losartan     Metronidazole     Nortriptyline     Oxybutynin     Pcn [penicillins]     Potassium     Pravachol [pravastatin]     Procaine     Promethazine     Propranolol     Ramipril     Sulfa (sulfonamide antibiotics)      Review of Systems:  Constitutional: no fever or chills  Eyes: no visual changes  Respiratory: no cough or shortness of breath  Cardiovascular: no chest pain   Gastrointestinal: no nausea or vomiting; no abdominal pain   Genitourinary: +urination     OBJECTIVE:      Vital Signs (Most Recent)  Temp: 97.6 °F (36.4 °C) (12/09/17 0459)  Pulse: 77 (12/09/17 0459)  Resp: 20 (12/09/17 0459)  BP: (!) 148/62 (12/09/17 0605)  SpO2: 96 % (12/09/17 0343)    Vital Signs Range (Last 24H):  Temp:  [97.1 °F (36.2 °C)-98.2 °F (36.8 °C)]   Pulse:  [67-78]   Resp:  [18-20]   BP: (140-190)/(60-76)   SpO2:  [93 %-97 %]     I & O (Last 24H):  Intake/Output Summary (Last 24 hours) at 12/09/17 0651  Last data filed at 12/09/17 0315   Gross per 24 hour   Intake                0 ml   Output              657 ml   Net             -657 ml     Physical Exam  General: AAOx3. NAD.  HEENT: NCAT. NC in place. Resolving small ecchymosis under bilateral eyelids. Poor dentition  CV: RRR. NL S1/S2. No murmurs.   Chest: clear to auscultation bilaterally, normal effort  Abd: +BS x 4. Soft. ND/NT.   Ext: right hip bandaged, c/d/i  Psych: Good judgement and reason. NL affect. No abnormal behaviors noted    Laboratory:  CBC:   Recent Labs  Lab 12/08/17  1333   WBC 9.11   RBC 2.87*   HGB 8.5*   HCT 26.8*      MCV 93   MCH 29.6   MCHC 31.7*     CMP:   Recent Labs  Lab 12/08/17  1332   *   CALCIUM 8.5*   ALBUMIN 2.3*   PROT 6.2   *   K 4.0   CO2 23   CL 99   BUN 30*   CREATININE 1.2   ALKPHOS 72   ALT 34   AST 64*   BILITOT 0.8     Coagulation:   Recent Labs  Lab 12/07/17  0435   LABPROT 11.9   INR 1.1   APTT 31.8     Diagnostic Results:  No new images.     ASSESSMENT/PLAN:     Alyssa Hastings is a 87 y.o. female admitted for right femoral fracture POD 3 s/p right femoral neck fracture repair awaiting SNF placement.            Closed displaced fracture of right femoral neck     - 2/2 mechanical fall   - POD 4 right femoral neck fracture repair   -Awaiting discharge to SNF per PT/OT recs       Hypothyroidism     - continue synthroid 50 mcg          Essential hypertension     - acutely elevated in ED on admit.  Likely 2/2 to pain  - On home medications: lisinopril 20 mg and metoprolol  -BP stable,  120's-160's/58-71  - Recommend close follow-up outpatient as likely 2/2 pain         History of CABG         -Per chart check hx of CABG x2          -2D echo on admit, EF 55%, moderate MVR and TVR           -On home Lisinopril and beta blocker. Per chart check,allergy to statin      Dispo: Awaiting placement    12/9/2017 6:55 AM Alexy Burnham M.D.

## 2017-12-10 LAB
ALBUMIN SERPL BCP-MCNC: 2.2 G/DL
ALP SERPL-CCNC: 69 U/L
ALT SERPL W/O P-5'-P-CCNC: 25 U/L
ANION GAP SERPL CALC-SCNC: 8 MMOL/L
AST SERPL-CCNC: 44 U/L
BASOPHILS # BLD AUTO: 0.03 K/UL
BASOPHILS NFR BLD: 0.5 %
BILIRUB SERPL-MCNC: 0.7 MG/DL
BUN SERPL-MCNC: 25 MG/DL
CALCIUM SERPL-MCNC: 8.1 MG/DL
CHLORIDE SERPL-SCNC: 101 MMOL/L
CO2 SERPL-SCNC: 27 MMOL/L
CREAT SERPL-MCNC: 1 MG/DL
DIFFERENTIAL METHOD: ABNORMAL
EOSINOPHIL # BLD AUTO: 0.2 K/UL
EOSINOPHIL NFR BLD: 3.1 %
ERYTHROCYTE [DISTWIDTH] IN BLOOD BY AUTOMATED COUNT: 16.2 %
EST. GFR  (AFRICAN AMERICAN): 59 ML/MIN/1.73 M^2
EST. GFR  (NON AFRICAN AMERICAN): 51 ML/MIN/1.73 M^2
GLUCOSE SERPL-MCNC: 121 MG/DL
HCT VFR BLD AUTO: 24.1 %
HGB BLD-MCNC: 7.9 G/DL
LYMPHOCYTES # BLD AUTO: 0.6 K/UL
LYMPHOCYTES NFR BLD: 11.5 %
MAGNESIUM SERPL-MCNC: 1.7 MG/DL
MCH RBC QN AUTO: 30 PG
MCHC RBC AUTO-ENTMCNC: 32.8 G/DL
MCV RBC AUTO: 92 FL
MONOCYTES # BLD AUTO: 0.7 K/UL
MONOCYTES NFR BLD: 13.1 %
NEUTROPHILS # BLD AUTO: 4 K/UL
NEUTROPHILS NFR BLD: 71.4 %
PHOSPHATE SERPL-MCNC: 2.7 MG/DL
PLATELET # BLD AUTO: 211 K/UL
PMV BLD AUTO: 9.4 FL
POTASSIUM SERPL-SCNC: 3.4 MMOL/L
PROT SERPL-MCNC: 5.7 G/DL
RBC # BLD AUTO: 2.63 M/UL
SODIUM SERPL-SCNC: 136 MMOL/L
WBC # BLD AUTO: 5.57 K/UL

## 2017-12-10 PROCEDURE — 25000003 PHARM REV CODE 250: Performed by: FAMILY MEDICINE

## 2017-12-10 PROCEDURE — 99900035 HC TECH TIME PER 15 MIN (STAT)

## 2017-12-10 PROCEDURE — 80053 COMPREHEN METABOLIC PANEL: CPT

## 2017-12-10 PROCEDURE — 83735 ASSAY OF MAGNESIUM: CPT

## 2017-12-10 PROCEDURE — 25000003 PHARM REV CODE 250: Performed by: STUDENT IN AN ORGANIZED HEALTH CARE EDUCATION/TRAINING PROGRAM

## 2017-12-10 PROCEDURE — 85025 COMPLETE CBC W/AUTO DIFF WBC: CPT

## 2017-12-10 PROCEDURE — 63600175 PHARM REV CODE 636 W HCPCS: Performed by: STUDENT IN AN ORGANIZED HEALTH CARE EDUCATION/TRAINING PROGRAM

## 2017-12-10 PROCEDURE — 25000003 PHARM REV CODE 250: Performed by: ORTHOPAEDIC SURGERY

## 2017-12-10 PROCEDURE — 97530 THERAPEUTIC ACTIVITIES: CPT

## 2017-12-10 PROCEDURE — 94799 UNLISTED PULMONARY SVC/PX: CPT

## 2017-12-10 PROCEDURE — 84100 ASSAY OF PHOSPHORUS: CPT

## 2017-12-10 PROCEDURE — 97110 THERAPEUTIC EXERCISES: CPT

## 2017-12-10 PROCEDURE — 25000003 PHARM REV CODE 250

## 2017-12-10 PROCEDURE — 11000001 HC ACUTE MED/SURG PRIVATE ROOM

## 2017-12-10 PROCEDURE — 36415 COLL VENOUS BLD VENIPUNCTURE: CPT

## 2017-12-10 PROCEDURE — 97116 GAIT TRAINING THERAPY: CPT

## 2017-12-10 PROCEDURE — 94761 N-INVAS EAR/PLS OXIMETRY MLT: CPT

## 2017-12-10 RX ORDER — POTASSIUM CHLORIDE 20 MEQ/15ML
40 SOLUTION ORAL ONCE
Status: COMPLETED | OUTPATIENT
Start: 2017-12-10 | End: 2017-12-10

## 2017-12-10 RX ORDER — LANOLIN ALCOHOL/MO/W.PET/CERES
400 CREAM (GRAM) TOPICAL 2 TIMES DAILY
Status: COMPLETED | OUTPATIENT
Start: 2017-12-10 | End: 2017-12-11

## 2017-12-10 RX ORDER — POTASSIUM CHLORIDE 20 MEQ/1
40 TABLET, EXTENDED RELEASE ORAL ONCE
Status: DISCONTINUED | OUTPATIENT
Start: 2017-12-10 | End: 2017-12-10

## 2017-12-10 RX ADMIN — Medication 400 MG: at 08:12

## 2017-12-10 RX ADMIN — HYDROCODONE BITARTRATE AND ACETAMINOPHEN 1 TABLET: 5; 325 TABLET ORAL at 01:12

## 2017-12-10 RX ADMIN — Medication 400 MG: at 11:12

## 2017-12-10 RX ADMIN — POTASSIUM & SODIUM PHOSPHATES POWDER PACK 280-160-250 MG 1 PACKET: 280-160-250 PACK at 05:12

## 2017-12-10 RX ADMIN — SUCRALFATE 1 G: 1 TABLET ORAL at 11:12

## 2017-12-10 RX ADMIN — AMIODARONE HYDROCHLORIDE 200 MG: 200 TABLET ORAL at 08:12

## 2017-12-10 RX ADMIN — METOPROLOL SUCCINATE 25 MG: 25 TABLET, EXTENDED RELEASE ORAL at 08:12

## 2017-12-10 RX ADMIN — POTASSIUM & SODIUM PHOSPHATES POWDER PACK 280-160-250 MG 1 PACKET: 280-160-250 PACK at 11:12

## 2017-12-10 RX ADMIN — PANTOPRAZOLE SODIUM 40 MG: 40 TABLET, DELAYED RELEASE ORAL at 08:12

## 2017-12-10 RX ADMIN — ENOXAPARIN SODIUM 40 MG: 100 INJECTION SUBCUTANEOUS at 05:12

## 2017-12-10 RX ADMIN — FERROUS SULFATE TAB EC 325 MG (65 MG FE EQUIVALENT) 325 MG: 325 (65 FE) TABLET DELAYED RESPONSE at 08:12

## 2017-12-10 RX ADMIN — BIMATOPROST 1 DROP: 0.1 SOLUTION/ DROPS OPHTHALMIC at 08:12

## 2017-12-10 RX ADMIN — POTASSIUM CHLORIDE 40 MEQ: 20 SOLUTION ORAL at 11:12

## 2017-12-10 RX ADMIN — MUPIROCIN 1 G: 20 OINTMENT TOPICAL at 08:12

## 2017-12-10 RX ADMIN — LEVOTHYROXINE SODIUM 50 MCG: 50 TABLET ORAL at 05:12

## 2017-12-10 RX ADMIN — SOLIFENACIN SUCCINATE 10 MG: 5 TABLET, FILM COATED ORAL at 08:12

## 2017-12-10 RX ADMIN — PSYLLIUM HUSK 1 PACKET: 3.4 POWDER ORAL at 09:12

## 2017-12-10 RX ADMIN — HYDROCODONE BITARTRATE AND ACETAMINOPHEN 1 TABLET: 5; 325 TABLET ORAL at 05:12

## 2017-12-10 RX ADMIN — SUCRALFATE 1 G: 1 TABLET ORAL at 05:12

## 2017-12-10 RX ADMIN — LISINOPRIL 40 MG: 20 TABLET ORAL at 08:12

## 2017-12-10 RX ADMIN — HYDROCODONE BITARTRATE AND ACETAMINOPHEN 1 TABLET: 5; 325 TABLET ORAL at 06:12

## 2017-12-10 RX ADMIN — FUROSEMIDE 20 MG: 20 TABLET ORAL at 08:12

## 2017-12-10 RX ADMIN — POTASSIUM & SODIUM PHOSPHATES POWDER PACK 280-160-250 MG 1 PACKET: 280-160-250 PACK at 08:12

## 2017-12-10 RX ADMIN — OLOPATADINE HYDROCHLORIDE 1 DROP: 1 SOLUTION/ DROPS OPHTHALMIC at 08:12

## 2017-12-10 NOTE — DISCHARGE SUMMARY
Discharge Summary      Admit Date: 12/3/2017    Discharge Date and Time: 12/12/2017    Attending Physician: Dr. Mandi Hughes    Discharge Physician: Jodi Rowan    Principal Diagnoses: Closed displaced fracture of right femoral neck  The primary encounter diagnosis was Closed displaced fracture of right femoral neck. Diagnoses of Right hip pain, Fall, Pre-op exam, Pre-op evaluation, Essential hypertension, Hypothyroidism, unspecified type, CKD stage G3a/A1, GFR 45-59 and albumin creatinine ratio <30 mg/g, Blindness of left eye, History of coronary artery bypass graft x 2, Fall, subsequent encounter, Hypokalemia, Hyponatremia, and Hypothyroidism due to acquired atrophy of thyroid were also pertinent to this visit.    Discharged Condition: stable    Hospital Course: Alyssa Hastings is a 87 y.o. female with PMHx of HTN, OA, CAD, hypothyroidism that presented to the ED after a fall at [facility] facility where patient resided. Patient tripped over her house shoes resulting in right femoral fracture. She denied head trauma or LOC during event. Ortho was consulted and after Cards clearance patient had right femoral neck fracture repair. 2D echo on admit showed EF 55% with moderate MVR and TVR. She was continued on home Lisinopril and Metoprolol XL. Per chart check, patient has allergy to statin. Pt eGFR was 51 at admit so all nephrotoxic meds were held. By discharge GFR was >60. Patient was stable on home Synthroid throughout admission. Patient worked with PT/OT who recommended SNF for patient. The pt was discharged to [facility]. Per ortho recs patient was discharged with Famotidine 20 mg BID and Aspirin 81 mg BID x4 weeks for DVT PPx.      Consults: IP CONSULT TO ORTHOPEDIC SURGERY  IP CONSULT TO CARDIOLOGY-[facility]  IP CONSULT TO SNF [facility]    Significant Diagnostic Studies: Xray Hip, Xray Chest, 2D echo    Treatments: PT/OT, Right Femoral Neck Fracture Repair    Disposition: Skilled Nursing  Facility    Patient Instructions:   Discharge Medication List as of 12/12/2017  1:38 PM      START taking these medications    Details   famotidine (PEPCID) 20 MG tablet Take 1 tablet (20 mg total) by mouth 2 (two) times daily., Starting Mon 12/11/2017, Until Mon 1/8/2018, Normal         CONTINUE these medications which have CHANGED    Details   !! aspirin 81 MG Chew Take 1 tablet (81 mg total) by mouth 2 (two) times daily., Starting Mon 12/11/2017, Until Mon 1/8/2018, Normal      !! aspirin 81 MG Chew Take 1 tablet (81 mg total) by mouth once daily., Starting Tue 1/9/2018, Until Wed 1/9/2019, Normal      traMADol (ULTRAM) 50 mg tablet Take 1 tablet (50 mg total) by mouth every 6 (six) hours as needed for Pain., Starting Wed 12/6/2017, Print       !! - Potential duplicate medications found. Please discuss with provider.      CONTINUE these medications which have NOT CHANGED    Details   acetaminophen (TYLENOL) 650 MG TbSR Take 1 tablet (650 mg total) by mouth every 12 (twelve) hours as needed (start with daily prn)., Starting Fri 8/25/2017, Normal      amiodarone (PACERONE) 200 MG Tab TAKE ONE TABLET BY MOUTH TWICE DAILY, Normal      bimatoprost (LUMIGAN) 0.03 % ophthalmic drops Place 1 drop into both eyes nightly., Starting Tue 8/1/2017, Normal      brimonidine-timolol (COMBIGAN) 0.2-0.5 % Drop Place 1 drop into the left eye 2 (two) times daily., Starting Tue 8/1/2017, Normal      docusate sodium (COLACE) 100 MG capsule Take 1 capsule (100 mg total) by mouth 2 (two) times daily as needed for Constipation., Starting Wed 8/30/2017, Normal      dorzolamide (TRUSOPT) 2 % ophthalmic solution Place 1 drop into the left eye 2 (two) times daily., Starting Tue 8/1/2017, Normal      esomeprazole (NEXIUM) 40 MG capsule Take 1 capsule (40 mg total) by mouth before breakfast., Starting Thu 11/9/2017, Normal      furosemide (LASIX) 20 MG tablet Take 1 tablet (20 mg total) by mouth once daily. May take an extra 20 mg weekly prn.,  "Starting Wed 11/22/2017, Until Thu 11/22/2018, Normal      hyoscyamine (ANASPAZ,LEVSIN) 0.125 mg Tab Take 125 mcg by mouth every 4 (four) hours as needed., Until Discontinued, Historical Med      ipratropium (ATROVENT) 0.02 % nebulizer solution Take 500 mcg by nebulization 4 (four) times daily. Rescue, Until Discontinued, Historical Med      ipratropium (ATROVENT) 0.06 % nasal spray USE 1 SPRAY INTO EACH NOSTRIL TWICE DAILY AS NEEDED FOR RHINITIS, Normal      levocetirizine (XYZAL) 5 MG tablet TAKE ONE TABLET BY MOUTH EVERY MORNING, Normal      levothyroxine (SYNTHROID) 50 MCG tablet Take 1 tablet (50 mcg total) by mouth once daily., Starting Wed 6/7/2017, Normal      lisinopril (PRINIVIL,ZESTRIL) 20 MG tablet TAKE ONE TABLET BY MOUTH EVERY DAY, Normal      METAMUCIL 0.52 gram capsule TAKE 2 TO 6 CAPSULES BY MOUTH DAILY OR TAKE 5 CAPSULES UP TO FOUR TIMES DAILY, Normal      metoprolol succinate (TOPROL-XL) 25 MG 24 hr tablet Take 1 tablet (25 mg total) by mouth once daily., Starting Mon 11/20/2017, Normal      nitroGLYCERIN (NITROSTAT) 0.4 MG SL tablet Place 0.4 mg under the tongue every 5 (five) minutes as needed for Chest pain., Until Discontinued, Historical Med      olopatadine (PATADAY) 0.2 % Drop Place 1 drop into both eyes once daily., Starting Tue 8/1/2017, Until Wed 8/1/2018, Normal      PSYLLIUM HUSK/CALCIUM CARB (METAMUCIL PLUS CALCIUM ORAL) Take by mouth., Historical Med      solifenacin (VESICARE) 10 MG tablet Take 1 tablet (10 mg total) by mouth once daily., Starting Tue 7/11/2017, Until Wed 7/11/2018, Normal      sucralfate (CARAFATE) 1 gram tablet Take 1 g by mouth 4 (four) times daily., Until Discontinued, Historical Med      trolamine salicylate (ASPERCREME) 10 % cream Apply topically as needed., Until Discontinued, Historical Med               Discharge Procedure Orders  WALKER FOR HOME USE   Order Specific Question Answer Comments   Type of Walker: Rollator    With wheels? Yes    Height: 5' 5" " "(1.651 m)    Weight: 78.8 kg (173 lb 11.6 oz)    Length of need (1-99 months): 99    Does patient have medical equipment at home? rollator    Does patient have medical equipment at home? bath bench    Please check all that apply: Patient's condition impairs ambulation.    Please check all that apply: Patient is unable to safely ambulate without equipment.    Please check all that apply: Patient needs help to get in and out of chair.      COMMODE FOR HOME USE   Order Specific Question Answer Comments   Type: Standard    Height: 5' 5" (1.651 m)    Weight: 78.8 kg (173 lb 11.6 oz)    Does patient have medical equipment at home? rollator    Does patient have medical equipment at home? bath bench    Length of need (1-99 months): 99      Diet Cardiac     Diet general     Diet renal     Diet Cardiac     Diet general     Diet Cardiac     Activity as tolerated     Call MD for:  redness, tenderness, or signs of infection (pain, swelling, redness, odor or green/yellow discharge around incision site)     Call MD for:  temperature >100.4     Call MD for:  severe uncontrolled pain     Activity as tolerated     Call MD for:  temperature >100.4     Call MD for:  severe uncontrolled pain     Call MD for:  redness, tenderness, or signs of infection (pain, swelling, redness, odor or green/yellow discharge around incision site)     Call MD for:  difficulty breathing or increased cough     Activity as tolerated     Call MD for:  redness, tenderness, or signs of infection (pain, swelling, redness, odor or green/yellow discharge around incision site)     Call MD for:  severe uncontrolled pain     Call MD for:  temperature >100.4         Jodi Rowan  12/13/2017  12:32 PM  "

## 2017-12-10 NOTE — PLAN OF CARE
Problem: Patient Care Overview  Goal: Plan of Care Review  Outcome: Ongoing (interventions implemented as appropriate)  No distress noted. Will continue to monitor

## 2017-12-10 NOTE — PLAN OF CARE
Problem: Patient Care Overview  Goal: Plan of Care Review  Plan of care discussed with patient. Pt's pain treated with PRN oral medication. Pt taking carafate crushed and mixed with juice. Dressing to right hip is CDI. Pt awake for most this shift but has been denying any needs. Pt assisted to shift weight in bed. Blood pressures elevated and checked manually. Pt safety maintained. Will monitor for any changes.

## 2017-12-10 NOTE — PROGRESS NOTES
Interval:   NAEO. Appropriate post-op pain. Denies numbness/paresthesias. Awaiting SNF placement.    Vitals:  Temp:  [97.8 °F (36.6 °C)-98.5 °F (36.9 °C)] 98.2 °F (36.8 °C)  Pulse:  [75-80] 75  Resp:  [16-20] 16  SpO2:  [95 %-99 %] 97 %  BP: (132-177)/(56-81) 177/73    Scheduled Meds:    amiodarone  200 mg Oral BID    bimatoprost  1 drop Both Eyes QHS    enoxaparin  40 mg Subcutaneous Daily    ferrous sulfate  325 mg Oral BID    furosemide  20 mg Oral Daily    levothyroxine  50 mcg Oral Before breakfast    lisinopril  40 mg Oral Daily    metoprolol succinate  25 mg Oral Daily    mupirocin  1 g Nasal BID    olopatadine  1 drop Both Eyes BID    pantoprazole  40 mg Oral Daily    potassium chloride 10%  40 mEq Oral Once    potassium, sodium phosphates  1 packet Oral QID (AC & HS)    psyllium  1 packet Oral Daily    solifenacin  10 mg Oral Daily    sucralfate  1 g Oral QID     Continuous Infusions:    PRN Meds: acetaminophen, acetaminophen, dextrose 50%, dextrose 50%, diphenhydrAMINE-zinc acetate 1-0.1%, docusate sodium, glucagon (human recombinant), glucose, glucose, hydrALAZINE, hydrocodone-acetaminophen 10-325mg, hydrocodone-acetaminophen 5-325mg, insulin aspart, ipratropium, morphine, ondansetron, sodium chloride 0.9%, sodium chloride 0.9%, traMADol    Diet: Diet Adult Regular  Diet Cardiac  Diet general  Diet renal  Diet Cardiac    Trended Lab Data:    Recent Labs  Lab 12/08/17  1332 12/08/17  1333 12/09/17  0815 12/09/17  0816 12/10/17  0614   WBC  --  9.11  --  6.11 5.57   HGB  --  8.5*  --  7.9* 7.9*   HCT  --  26.8*  --  23.6* 24.1*   PLT  --  206  --  209 211   MCV  --  93  --  92 92   RDW  --  16.2*  --  16.3* 16.2*   *  --  132*  --  136   K 4.0  --  3.4*  --  3.4*   CL 99  --  100  --  101   CO2 23  --  25  --  27   BUN 30*  --  27*  --  25*   CREATININE 1.2  --  1.0  --  1.0   *  --  105  --  121*   PROT 6.2  --  5.6*  --  5.7*   ALBUMIN 2.3*  --  2.1*  --  2.2*   BILITOT 0.8   --  0.7  --  0.7   AST 64*  --  48*  --  44*   ALKPHOS 72  --  67  --  69   ALT 34  --  28  --  25       I/O last 3 completed shifts:  In: -   Out: 1255 [Urine:1255]    Exam:  Gen NAD  Resp unlabored  CV RR  RLE:  Dressing c/d/i  EHL/FHL/TA/GS intact  SILT s/s/sp/dp/t  BCR    Impression/Plan:  Alyssarojelio Hastings is a 87 y.o. female with a right femoral neck fracture s/p R hip hemiarthroplasty on 12/5  - WBAT RLE, posterior hip precautions  - PT/OT  - medical management per primary  - recommend DVT ppx upon discharge (ASA 81BID + pepcid 20 BID for 4 weeks)  - awaiting SNF placement

## 2017-12-10 NOTE — PT/OT/SLP PROGRESS
Physical Therapy Treatment    Patient Name:  Alyssa Hastings   MRN:  [id number]    Recommendations:     Discharge Recommendations:  nursing facility, skilled   Discharge Equipment Recommendations: walker, rolling, bedside commode   Barriers to discharge: Decreased caregiver support    Assessment:     Alyssa Hastings is a 87 y.o. female admitted with a medical diagnosis of Closed displaced fracture of right femoral neck.  She presents with the following impairments/functional limitations:  weakness, impaired endurance, impaired self care skills, impaired functional mobilty, gait instability, impaired balance, impaired cognition, decreased upper extremity function, decreased lower extremity function, decreased safety awareness, pain, orthopedic precautions.  Pt demonstrated improvement in functional mobility and gait today requiring less assistance.  Pt would continue to benefit from P.T. To address impairments listed above.    Rehab Prognosis:  Good; patient would benefit from acute skilled PT services to address these deficits and reach maximum level of function.      Recent Surgery: Procedure(s) (LRB):  HEMIARTHROPLASTY - RIGHT HIP (Right) 5 Days Post-Op    Plan:     During this hospitalization, patient to be seen BID to address the above listed problems via gait training, therapeutic activities, therapeutic exercises  · Plan of Care Expires:  01/06/18   Plan of Care Reviewed with: patient    Subjective     Communicated with Rn (Nikkie) prior to session.  Patient found supine upon PT entry to room, agreeable to treatment.        Patient comments/goals: Pt agreed to tx.  Pain/Comfort:  · Pain Rating 1: 3/10  · Location - Side 1: Right  · Location - Orientation 1: generalized  · Location 1: hip  · Pain Addressed 1: Reposition, Distraction, Nurse notified  · Pain Rating Post-Intervention 1: 6/10    Patients cultural, spiritual, Jew conflicts given the current situation: None reported    Objective:     Patient  found with: telemetry, oxygen     General Precautions: Standard, fall   Orthopedic Precautions:RLE weight bearing as tolerated, RLE posterior precautions   Braces:       Functional Mobility:  · Bed Mobility:     · Rolling Left:  contact guard assistance and and use of B/R  · Rolling Right: contact guard assistance and and use of B/R  · Scooting: moderate assistance to EOB with vc's and increased time.  · Bridging: stand by assistance and with LLE to move to center of bed  · Supine to Sit: minimum assistance for  RLE and CGA for trunk balance with vc's for sequencing  · Sit to Supine: moderate assistance/Maximum assistance for RLE        Transfers:     · Sit to Stand:  contact guard assistance and minimum assistance with rolling walker and vc's/tc's for hand placement x 3 reps  · Eob <> b/s commode:  minimum assistance for RW management and vc's for sequencing.  Close CGA for balance.2 small steps  · Gait: 4 steps forward and backward with RW and close CGA/Louisa with continual vc's for sequencing and upright posture as able.    AM-PAC 6 CLICK MOBILITY  Turning over in bed (including adjusting bedclothes, sheets and blankets)?: 3  Sitting down on and standing up from a chair with arms (e.g., wheelchair, bedside commode, etc.): 3  Moving from lying on back to sitting on the side of the bed?: 3  Moving to and from a bed to a chair (including a wheelchair)?: 3  Need to walk in hospital room?: 3  Climbing 3-5 steps with a railing?: 1  Total Score: 16       Therapeutic Activities and Exercises:   SEated BLE therex: AP, LAQ, x 15 reps.  Supine heelslides x 15 reps. All hip precautions maintained.  Pt knew 1 out of 3 hip precautions, and was educated on all hip precautions throughout tx.    Patient left supine with all lines intact, call button in reach, bed alarm on and RN notified..    GOALS:    Physical Therapy Goals        Problem: Physical Therapy Goal    Goal Priority Disciplines Outcome Goal Variances Interventions    Physical Therapy Goal     PT/OT, PT Ongoing (interventions implemented as appropriate)     Description:  Goals to be met by: 18     Patient will increase functional independence with mobility by performin) sit <>sup with SBA  2) Sit <>stand with CGA X1  3) ambulate 75 feet with CGA X1                    Time Tracking:     PT Received On: 12/10/17  PT Start Time: 1225     PT Stop Time: 1308  PT Total Time (min): 43 min     Billable Minutes: Therapeutic Activities: 18 minutes, Therapeutic Exercise 10 minutes, Gait training 15 minutes  Treatment Type: Treatment  PT/PTA: PTA     PTA Visit Number: 3     Ayanna Tillman PTA  12/10/2017

## 2017-12-10 NOTE — PROGRESS NOTES
Progress Note  [facility]  Admit Date: 12/3/2017   LOS: 7 days   SUBJECTIVE:   Follow-up For: POD 5 right hip hemiarthroplasty and medical management    Patient seen and examined this AM. Urinating, sleeping and eating well. Working well with PT/OT. Hip pain well controlled.    ROS  Denies CP, SOB, f/c, N/V.    OBJECTIVE:   Vital Signs (Most Recent)  Temp: 97.9 °F (36.6 °C) (12/10/17 0437)  Pulse: 78 (12/09/17 2340)  Resp: 18 (12/10/17 0437)  BP: (!) 148/58 (12/10/17 0437)  SpO2: 97 % (12/10/17 0008)    I & O (Last 24H):  Intake/Output Summary (Last 24 hours) at 12/10/17 0749  Last data filed at 12/09/17 1800   Gross per 24 hour   Intake                0 ml   Output              762 ml   Net             -762 ml     Wt Readings from Last 3 Encounters:   12/10/17 77.1 kg (169 lb 15.6 oz)   09/27/17 81.2 kg (179 lb)   09/21/17 75.3 kg (166 lb 0.1 oz)       Current Diet Order   Procedures    Diet Adult Regular    Diet Cardiac    Diet general    Diet renal    Diet Cardiac        Physical Exam  General: AAOx3. NAD.  HEENT: NCAT. Resolving small ecchymosis under bilateral eyelids. Poor dentition  CV: RRR. NL S1/S2. No murmurs  Chest: clear to auscultation bilaterally, normal effort  Abd: +BS x 4. Soft. ND/NT.   Ext: right hip bandaged,c/d/i, z flex boots bilaterally  Psych: Good judgement and reason. No abnormal behaviors noted      Laboratory Data:  CBC    Recent Labs  Lab 12/08/17  1333 12/09/17  0816 12/10/17  0614   WBC 9.11 6.11 5.57   RBC 2.87* 2.58* 2.63*   HGB 8.5* 7.9* 7.9*   HCT 26.8* 23.6* 24.1*    209 211   MCV 93 92 92   MCH 29.6 30.6 30.0   MCHC 31.7* 33.5 32.8     CMP    Recent Labs  Lab 12/07/17  0435 12/08/17  1332 12/09/17  0815   CALCIUM 8.1* 8.5* 8.2*   PROT 5.6* 6.2 5.6*   * 133* 132*   K 3.7 4.0 3.4*   CO2 27 23 25    99 100   BUN 27* 30* 27*   CREATININE 1.3 1.2 1.0   ALKPHOS 66 72 67   ALT 36 34 28   AST 74* 64* 48*   BILITOT 0.7 0.8 0.7     POCT-Glucose  POCT  Glucose   Date Value Ref Range Status   12/08/2017 132 (H) 70 - 110 mg/dL Final     COAGS    Recent Labs  Lab 12/05/17  0430 12/06/17  0547 12/07/17  0435   INR 1.1 1.1 1.1   APTT 30.1 30.0 31.8       ASSESSMENT/PLAN:   Alyssa Hastings is a 87 y.o. female with PMHx of Hypothyroidism, HTN, POD 5  s/p right femoral neck fracture repair awaiting SNF placement.            Closed displaced fracture of right femoral neck     - 2/2 mechanical fall   - POD 5 right femoral neck fracture repair   -Awaiting placement       Hypothyroidism     - continue synthroid 50 mcg          Essential hypertension     - acutely elevated in ED on admit.  Likely 2/2 to pain  - On home medications: lisinopril 20 mg and metoprolol  - BP stable, 140's-150's/50-70s  - Recommend close follow-up outpatient as likely 2/2 pain         History of CABG         -Per chart check hx of CABG x2          -2D echo on admit, EF 55%, moderate MVR and TVR           -On home Lisinopril and beta blocker. Per chart check, allergy to statin      Dispo: Awaiting placement, continue to work with PT/OT, monitor BP       12/10/2017 Jodi Rowan MD  7:49 AM

## 2017-12-11 PROBLEM — M25.551 RIGHT HIP PAIN: Status: ACTIVE | Noted: 2017-12-11

## 2017-12-11 PROBLEM — E87.6 HYPOKALEMIA: Status: ACTIVE | Noted: 2017-12-11
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
## 2017-12-11 LAB
ALBUMIN SERPL BCP-MCNC: 2.1 G/DL
ALP SERPL-CCNC: 66 U/L
ALT SERPL W/O P-5'-P-CCNC: 26 U/L
ANION GAP SERPL CALC-SCNC: 6 MMOL/L
AST SERPL-CCNC: 51 U/L
BASOPHILS # BLD AUTO: 0.02 K/UL
BASOPHILS NFR BLD: 0.4 %
BILIRUB SERPL-MCNC: 0.6 MG/DL
BUN SERPL-MCNC: 21 MG/DL
CALCIUM SERPL-MCNC: 8 MG/DL
CHLORIDE SERPL-SCNC: 100 MMOL/L
CO2 SERPL-SCNC: 29 MMOL/L
CREAT SERPL-MCNC: 0.9 MG/DL
DIFFERENTIAL METHOD: ABNORMAL
EOSINOPHIL # BLD AUTO: 0.2 K/UL
EOSINOPHIL NFR BLD: 3.4 %
ERYTHROCYTE [DISTWIDTH] IN BLOOD BY AUTOMATED COUNT: 16.2 %
EST. GFR  (AFRICAN AMERICAN): >60 ML/MIN/1.73 M^2
EST. GFR  (NON AFRICAN AMERICAN): 58 ML/MIN/1.73 M^2
GLUCOSE SERPL-MCNC: 105 MG/DL
HCT VFR BLD AUTO: 24.4 %
HGB BLD-MCNC: 8.1 G/DL
LYMPHOCYTES # BLD AUTO: 0.9 K/UL
LYMPHOCYTES NFR BLD: 16.4 %
MAGNESIUM SERPL-MCNC: 1.4 MG/DL
MCH RBC QN AUTO: 30.7 PG
MCHC RBC AUTO-ENTMCNC: 33.2 G/DL
MCV RBC AUTO: 92 FL
MONOCYTES # BLD AUTO: 0.7 K/UL
MONOCYTES NFR BLD: 13.7 %
NEUTROPHILS # BLD AUTO: 3.5 K/UL
NEUTROPHILS NFR BLD: 65.7 %
PHOSPHATE SERPL-MCNC: 2.9 MG/DL
PLATELET # BLD AUTO: 202 K/UL
PMV BLD AUTO: 9.1 FL
POTASSIUM SERPL-SCNC: 3.7 MMOL/L
PROT SERPL-MCNC: 5.6 G/DL
RBC # BLD AUTO: 2.64 M/UL
SODIUM SERPL-SCNC: 135 MMOL/L
WBC # BLD AUTO: 5.25 K/UL

## 2017-12-11 PROCEDURE — 63600175 PHARM REV CODE 636 W HCPCS: Performed by: STUDENT IN AN ORGANIZED HEALTH CARE EDUCATION/TRAINING PROGRAM

## 2017-12-11 PROCEDURE — 25000003 PHARM REV CODE 250: Performed by: FAMILY MEDICINE

## 2017-12-11 PROCEDURE — 63600175 PHARM REV CODE 636 W HCPCS: Performed by: FAMILY MEDICINE

## 2017-12-11 PROCEDURE — 99900035 HC TECH TIME PER 15 MIN (STAT)

## 2017-12-11 PROCEDURE — 94799 UNLISTED PULMONARY SVC/PX: CPT

## 2017-12-11 PROCEDURE — G8988 SELF CARE GOAL STATUS: HCPCS | Mod: CJ

## 2017-12-11 PROCEDURE — 97116 GAIT TRAINING THERAPY: CPT

## 2017-12-11 PROCEDURE — 25000003 PHARM REV CODE 250: Performed by: ORTHOPAEDIC SURGERY

## 2017-12-11 PROCEDURE — 25000003 PHARM REV CODE 250: Performed by: STUDENT IN AN ORGANIZED HEALTH CARE EDUCATION/TRAINING PROGRAM

## 2017-12-11 PROCEDURE — 87086 URINE CULTURE/COLONY COUNT: CPT

## 2017-12-11 PROCEDURE — G8987 SELF CARE CURRENT STATUS: HCPCS | Mod: CL

## 2017-12-11 PROCEDURE — 97530 THERAPEUTIC ACTIVITIES: CPT

## 2017-12-11 PROCEDURE — 36415 COLL VENOUS BLD VENIPUNCTURE: CPT

## 2017-12-11 PROCEDURE — 84100 ASSAY OF PHOSPHORUS: CPT

## 2017-12-11 PROCEDURE — 97110 THERAPEUTIC EXERCISES: CPT

## 2017-12-11 PROCEDURE — 87088 URINE BACTERIA CULTURE: CPT

## 2017-12-11 PROCEDURE — 87186 SC STD MICRODIL/AGAR DIL: CPT

## 2017-12-11 PROCEDURE — 94761 N-INVAS EAR/PLS OXIMETRY MLT: CPT

## 2017-12-11 PROCEDURE — 11000001 HC ACUTE MED/SURG PRIVATE ROOM

## 2017-12-11 PROCEDURE — 80053 COMPREHEN METABOLIC PANEL: CPT

## 2017-12-11 PROCEDURE — 25000003 PHARM REV CODE 250

## 2017-12-11 PROCEDURE — 85025 COMPLETE CBC W/AUTO DIFF WBC: CPT

## 2017-12-11 PROCEDURE — 83735 ASSAY OF MAGNESIUM: CPT

## 2017-12-11 PROCEDURE — 87077 CULTURE AEROBIC IDENTIFY: CPT

## 2017-12-11 RX ORDER — METOPROLOL SUCCINATE 25 MG/1
50 TABLET, EXTENDED RELEASE ORAL DAILY
Status: DISCONTINUED | OUTPATIENT
Start: 2017-12-11 | End: 2017-12-12 | Stop reason: HOSPADM

## 2017-12-11 RX ORDER — NAPROXEN SODIUM 220 MG/1
81 TABLET, FILM COATED ORAL 2 TIMES DAILY
Qty: 100 TABLET | Refills: 3 | Status: SHIPPED | OUTPATIENT
Start: 2017-12-11 | End: 2018-01-11

## 2017-12-11 RX ORDER — MUPIROCIN 20 MG/G
OINTMENT TOPICAL 2 TIMES DAILY
Status: DISCONTINUED | OUTPATIENT
Start: 2017-12-11 | End: 2017-12-12 | Stop reason: HOSPADM

## 2017-12-11 RX ORDER — HYDRALAZINE HYDROCHLORIDE 10 MG/1
10 TABLET, FILM COATED ORAL EVERY 8 HOURS
Status: DISCONTINUED | OUTPATIENT
Start: 2017-12-11 | End: 2017-12-12

## 2017-12-11 RX ORDER — NAPROXEN SODIUM 220 MG/1
81 TABLET, FILM COATED ORAL DAILY
Qty: 30 TABLET | Refills: 6 | Status: SHIPPED | OUTPATIENT
Start: 2018-01-09 | End: 2018-01-02

## 2017-12-11 RX ORDER — DIPHENHYDRAMINE HYDROCHLORIDE 50 MG/ML
50 INJECTION INTRAMUSCULAR; INTRAVENOUS ONCE
Status: DISCONTINUED | OUTPATIENT
Start: 2017-12-11 | End: 2017-12-12 | Stop reason: HOSPADM

## 2017-12-11 RX ORDER — FAMOTIDINE 20 MG/1
20 TABLET, FILM COATED ORAL 2 TIMES DAILY
Qty: 56 TABLET | Refills: 0 | Status: SHIPPED | OUTPATIENT
Start: 2017-12-11 | End: 2018-01-08

## 2017-12-11 RX ORDER — MAGNESIUM SULFATE 1 G/100ML
1 INJECTION INTRAVENOUS ONCE
Status: COMPLETED | OUTPATIENT
Start: 2017-12-11 | End: 2017-12-11

## 2017-12-11 RX ADMIN — Medication 400 MG: at 09:12

## 2017-12-11 RX ADMIN — LISINOPRIL 40 MG: 20 TABLET ORAL at 10:12

## 2017-12-11 RX ADMIN — FERROUS SULFATE TAB EC 325 MG (65 MG FE EQUIVALENT) 325 MG: 325 (65 FE) TABLET DELAYED RESPONSE at 09:12

## 2017-12-11 RX ADMIN — POTASSIUM & SODIUM PHOSPHATES POWDER PACK 280-160-250 MG 1 PACKET: 280-160-250 PACK at 09:12

## 2017-12-11 RX ADMIN — SUCRALFATE 1 G: 1 TABLET ORAL at 05:12

## 2017-12-11 RX ADMIN — AMIODARONE HYDROCHLORIDE 200 MG: 200 TABLET ORAL at 10:12

## 2017-12-11 RX ADMIN — HYDROCODONE BITARTRATE AND ACETAMINOPHEN 1 TABLET: 5; 325 TABLET ORAL at 09:12

## 2017-12-11 RX ADMIN — BIMATOPROST 1 DROP: 0.1 SOLUTION/ DROPS OPHTHALMIC at 09:12

## 2017-12-11 RX ADMIN — AMIODARONE HYDROCHLORIDE 200 MG: 200 TABLET ORAL at 09:12

## 2017-12-11 RX ADMIN — OLOPATADINE HYDROCHLORIDE 1 DROP: 1 SOLUTION/ DROPS OPHTHALMIC at 10:12

## 2017-12-11 RX ADMIN — MUPIROCIN: 20 OINTMENT TOPICAL at 12:12

## 2017-12-11 RX ADMIN — FERROUS SULFATE TAB EC 325 MG (65 MG FE EQUIVALENT) 325 MG: 325 (65 FE) TABLET DELAYED RESPONSE at 10:12

## 2017-12-11 RX ADMIN — SOLIFENACIN SUCCINATE 10 MG: 5 TABLET, FILM COATED ORAL at 10:12

## 2017-12-11 RX ADMIN — POTASSIUM & SODIUM PHOSPHATES POWDER PACK 280-160-250 MG 1 PACKET: 280-160-250 PACK at 12:12

## 2017-12-11 RX ADMIN — POTASSIUM & SODIUM PHOSPHATES POWDER PACK 280-160-250 MG 1 PACKET: 280-160-250 PACK at 05:12

## 2017-12-11 RX ADMIN — HYDRALAZINE HYDROCHLORIDE 10 MG: 10 TABLET ORAL at 11:12

## 2017-12-11 RX ADMIN — ENOXAPARIN SODIUM 40 MG: 100 INJECTION SUBCUTANEOUS at 05:12

## 2017-12-11 RX ADMIN — HYDROCODONE BITARTRATE AND ACETAMINOPHEN 1 TABLET: 5; 325 TABLET ORAL at 10:12

## 2017-12-11 RX ADMIN — METOPROLOL SUCCINATE 50 MG: 25 TABLET, EXTENDED RELEASE ORAL at 10:12

## 2017-12-11 RX ADMIN — LEVOTHYROXINE SODIUM 50 MCG: 50 TABLET ORAL at 05:12

## 2017-12-11 RX ADMIN — MAGNESIUM SULFATE 1 G: 1 INJECTION INTRAVENOUS at 10:12

## 2017-12-11 RX ADMIN — Medication 400 MG: at 10:12

## 2017-12-11 RX ADMIN — OLOPATADINE HYDROCHLORIDE 1 DROP: 1 SOLUTION/ DROPS OPHTHALMIC at 09:12

## 2017-12-11 RX ADMIN — MUPIROCIN: 20 OINTMENT TOPICAL at 09:12

## 2017-12-11 RX ADMIN — FUROSEMIDE 20 MG: 20 TABLET ORAL at 10:12

## 2017-12-11 RX ADMIN — HYDROCODONE BITARTRATE AND ACETAMINOPHEN 1 TABLET: 5; 325 TABLET ORAL at 03:12

## 2017-12-11 RX ADMIN — HYDRALAZINE HYDROCHLORIDE 10 MG: 20 INJECTION INTRAMUSCULAR; INTRAVENOUS at 05:12

## 2017-12-11 RX ADMIN — SUCRALFATE 1 G: 1 TABLET ORAL at 12:12

## 2017-12-11 RX ADMIN — PANTOPRAZOLE SODIUM 40 MG: 40 TABLET, DELAYED RELEASE ORAL at 10:12

## 2017-12-11 NOTE — PT/OT/SLP PROGRESS
Occupational Therapy  Treatment    Alyssa Hastings   MRN: [id number]   Admitting Diagnosis: Closed displaced fracture of right femoral neck    OT Date of Treatment: 12/11/17   OT Start Time: 1035  OT Stop Time: 1128  OT Total Time (min): 53 min    Billable Minutes:  Therapeutic Activity 45, Therapeutic Exercise 8 and Total Time 53    General Precautions: Standard, fall, hearing impaired, blind  Orthopedic Precautions: RLE weight bearing as tolerated, RLE posterior precautions  Braces:      Do you have any cultural, spiritual, Rastafarian conflicts, given your current situation?:  (none reported)    Subjective:  Communicated with nsg prior to session.    Pain/Comfort  Pain Rating 1: 4/10  Location - Side 1: Left  Location - Orientation 1: generalized  Location 1: hip  Pain Addressed 1: Pre-medicate for activity, Reposition, Distraction  Pain Rating Post-Intervention 1: 3/10    Objective:  Patient found with: telemetry     Functional Mobility:  Bed Mobility: sup<-->EOB via bed rail w/ Mod A; scooting to EOB w/ Mod A; scooting to HOB w/ Mod A       Transfers: sit<-->stand: 1st attempt unable; 2nd attempt via RW w/ Min A x ~20 min w/ intermittent B UE TE & LE WSing; 3rd attempt w/ Min A & sidestepping to R w/ Min A for manipulation of RW        Functional Ambulation:     Activities of Daily Living:     Feeding adaptive equipment:      UE adaptive equipment:      LE adaptive equipment:                     Bathing adaptive equipment:     Balance:   Static Sit: GOOD-: Takes MODERATE challenges from all directions but inconsistently  Dynamic Sit:   Static Stand: FAIR: Maintains without assist but unable to take challenges  Dynamic stand:    Therapeutic Activities and Exercises:  Edu/tx re: post hip precautions; B UE TE in standing 1 x 10 reps ea all major jts/planes; LE WSing in standing 5x10 reps ea    AM-PAC 6 CLICK ADL   How much help from another person does this patient currently need?   1 = Unable, Total/Dependent  "Assistance  2 = A lot, Maximum/Moderate Assistance  3 = A little, Minimum/Contact Guard/Supervision  4 = None, Modified [place]/Independent    Putting on and taking off regular lower body clothing? : 1  Bathing (including washing, rinsing, drying)?: 2  Toileting, which includes using toilet, bedpan, or urinal? : 2  Putting on and taking off regular upper body clothing?: 3  Taking care of personal grooming such as brushing teeth?: 3  Eating meals?: 4  Total Score: 15     AM-PAC Raw Score CMS "G-Code Modifier Level of Impairment Assistance   6 % Total / Unable   7 - 8 CM 80 - 100% Maximal Assist   9-13 CL 60 - 80% Moderate Assist   14 - 19 CK 40 - 60% Moderate Assist   20 - 22 CJ 20 - 40% Minimal Assist   23 CI 1-20% SBA / CGA   24 CH 0% Independent/ Mod I       Patient left HOB elevated with all lines intact, call button in reach, bed alarm on and nsg notified    ASSESSMENT:  Alyssa Hastings is a 87 y.o. female with a medical diagnosis of Closed displaced fracture of right femoral neck and presents with Pt agreeable to tx this date. Pt w/ slow, but good progress w/ bed mobility & standing. Ami'd static standing via RW w/ LE WS'ing & B UE TE x ~20min. Pt w/ good potential for cont improvement w/ SNF svcs.  .    Rehab identified problem list/impairments: Rehab identified problem list/impairments: weakness, impaired endurance, impaired self care skills, impaired functional mobilty, gait instability, visual deficits, impaired balance, decreased lower extremity function, pain, decreased ROM, impaired coordination, edema, impaired skin, impaired joint extensibility, orthopedic precautions    Rehab potential is good.    Activity tolerance: Good    Discharge recommendations: Discharge Facility/Level Of Care Needs: nursing facility, skilled     Barriers to discharge: Barriers to Discharge: Decreased caregiver support    Equipment recommendations: walker, rolling, bedside commode     GOALS:    Occupational " Therapy Goals        Problem: Occupational Therapy Goal    Goal Priority Disciplines Outcome Interventions   Occupational Therapy Goal     OT, PT/OT Ongoing (interventions implemented as appropriate)    Description:  Goals to be met by: 01/06/18     Patient will increase functional independence with ADLs by performing:    LE Dressing with Moderate Assistance.  Grooming while standing at sink with Stand-by Assistance.  Toileting from toilet with Stand-by Assistance for hygiene and clothing management.   Toilet transfer to toilet with Stand-by Assistance.  Upper extremity exercise program x10 reps per handout, with independence.                      Plan:  Patient to be seen 5 x/week to address the above listed problems via self-care/home management, therapeutic activities, therapeutic exercises  Plan of Care expires: 01/06/18  Plan of Care reviewed with: patient    OT G-codes  Functional Assessment Tool Used: scott pac  Score: 14  Functional Limitation: Self care  Self Care Current Status (): CL  Self Care Goal Status (): STEVEN Coleman  12/11/2017

## 2017-12-11 NOTE — PROGRESS NOTES
12:39pm - [facility] admit coordinator, Rj informed transition navigator, Shira Miller they can not accept patient for skilled level of care due to insurance.      made phone contact with son's second preference [facility] admit coordinator, Annabella informing her son is amendable for patient to admit to [facility] today.  Annabella reported patient is accepted for skilled level of care pending insurance authorization and son completing admit paper work.   informed Annabella that son request for admit paper work to be faxed ([phone]) or emailed ([email or web address]) to him.  Annabella reported she will make phone contact with son regarding admission paper work.

## 2017-12-11 NOTE — PT/OT/SLP PROGRESS
Physical Therapy Treatment    Patient Name:  Alyssa Hastings   MRN:  [id number]    Recommendations:     Discharge Recommendations:  nursing facility, skilled   Discharge Equipment Recommendations: bedside commode   Barriers to discharge: Decreased caregiver support    Assessment:     Alyssa Hastings is a 87 y.o. female admitted with a medical diagnosis of Closed displaced fracture of right femoral neck.  She presents with the following impairments/functional limitations:  impaired endurance, weakness, impaired self care skills, impaired functional mobilty, gait instability, impaired balance, decreased lower extremity function, decreased upper extremity function, pain, decreased safety awareness, impaired skin. Pt demonstrated an increase in ambulation distance today with improved upright posture.  Pt would continue to benefit from P.T. To address impairments listed above.    Rehab Prognosis:  Good; patient would benefit from acute skilled PT services to address these deficits and reach maximum level of function.      Recent Surgery: Procedure(s) (LRB):  HEMIARTHROPLASTY - RIGHT HIP (Right) 6 Days Post-Op    Plan:     During this hospitalization, patient to be seen BID to address the above listed problems via gait training, therapeutic activities, therapeutic exercises  · Plan of Care Expires:  01/06/18   Plan of Care Reviewed with: patient    Subjective     Communicated with RN (Treva) prior to session.  Patient found supine upon PT entry to room, agreeable to treatment.        Patient comments: Pt agreed to tx.  Attempted to see pt twice earlier, but pt was being assisted by CNA with cleaning and 2nd attempt, pt was on phone.  Pain/Comfort:  · Location 1: hip  · Pain Addressed 1: Pre-medicate for activity, Reposition, Distraction  · Pain Rating Post-Intervention 1: 3/10    Patients cultural, spiritual, Islam conflicts given the current situation: None reported    Objective:     Patient found with: telemetry      General Precautions: Standard, fall   Orthopedic Precautions:RLE weight bearing as tolerated, RLE posterior precautions   Braces:       Functional Mobility:  · Bed Mobility:     · Rolling Left:  stand by assistance  · Rolling Right: stand by assistance  · Scooting: CGA to ModA to scoot to EOB with increased time to perform  · Bridging: supervision and bridged using LLE with vc's  · Supine to Sit: minimum assistance and in long sitting with Jeff for RLE and CGA/Jeff for trunk.  Increased time to perform with vc's for technique.  · Sit to Supine: moderate assistance and for RLE and to assist with control of lowering trunk      AM-PAC 6 CLICK MOBILITY  Turning over in bed (including adjusting bedclothes, sheets and blankets)?: 3  Sitting down on and standing up from a chair with arms (e.g., wheelchair, bedside commode, etc.): 3  Moving from lying on back to sitting on the side of the bed?: 3  Moving to and from a bed to a chair (including a wheelchair)?: 3  Need to walk in hospital room?: 3  Climbing 3-5 steps with a railing?: 1  Total Score: 16       Therapeutic Activities and Exercises:   Supine BLE therex: AP, heelslides, hip ABD/ADD(hip precautions maintained), QS/glut sets x 10 to 15 reps with Jeff/CGA.  Sit <> Stand: with Jeff/CGA with RW and vc's for hand placement and to extend RLE out in front prior to standing or sitting to maintain hip precautions.  Gait: 30ft x 2 with RW and Jeff/CGA with decreased gus and 3 pt gait.  Jeff for occasional RW control with vc's for upright posture.  Pt maintained upright posture better today than yesterday.    Patient left supine with all lines intact, call button in reach, bed alarm on and RN notified..    GOALS:    Physical Therapy Goals        Problem: Physical Therapy Goal    Goal Priority Disciplines Outcome Goal Variances Interventions   Physical Therapy Goal     PT/OT, PT Ongoing (interventions implemented as appropriate)     Description:  Goals to be met by:  18     Patient will increase functional independence with mobility by performin) sit <>sup with SBA  2) Sit <>stand with CGA X1  3) ambulate 75 feet with CGA X1                    Time Tracking:     PT Received On: 17  PT Start Time: 1243     PT Stop Time: 1314  PT Total Time (min): 31 min     Billable Minutes: Gait Training 16 and Therapeutic Activity 15    Treatment Type: Treatment  PT/PTA: PTA     PTA Visit Number: 4     Ayanna Tillman PTA  2017

## 2017-12-11 NOTE — PT/OT/SLP PROGRESS
Physical Therapy      Patient Name:  Alyssa Hastings   MRN:  [id number]    Patient not seen today secondary to to declining.  Pt stated she was feeling nervous at this time and was not going to leave today as she thought.  Will follow-up 12/12/17.    Ayanna Tillman, PTA

## 2017-12-11 NOTE — PLAN OF CARE
Problem: Occupational Therapy Goal  Goal: Occupational Therapy Goal  Goals to be met by: 01/06/18     Patient will increase functional independence with ADLs by performing:    LE Dressing with Moderate Assistance.  Grooming while standing at sink with Stand-by Assistance.  Toileting from toilet with Stand-by Assistance for hygiene and clothing management.   Toilet transfer to toilet with Stand-by Assistance.  Upper extremity exercise program x10 reps per handout, with independence.     Outcome: Ongoing (interventions implemented as appropriate)  Pt agreeable to tx this date. Pt w/ slow, but good progress w/ bed mobility & standing. Ami'd static standing via RW w/ LE WS'ing & B UE TE x ~20min. Pt w/ good potential for cont improvement w/ SNF svcs.

## 2017-12-11 NOTE — PROGRESS NOTES
9:53am - Transition navigator, Shira Miller spoke with son regarding preferred skilled nursing facility (SNF). Son reported [facility] as his preference.   attempted to make phone contact with [facility] admit coordinator, Tate.  She was not available left voice message stating son has selected [facility] and patient is ready for discharge today.

## 2017-12-11 NOTE — PROGRESS NOTES
Interval:   NAEO. Denies numbness/paresthesias. Awaiting SNF placement.    Vitals:  Temp:  [96.3 °F (35.7 °C)-98.8 °F (37.1 °C)] 97.5 °F (36.4 °C)  Pulse:  [73-82] 73  Resp:  [16-19] 19  SpO2:  [94 %-99 %] 99 %  BP: (142-187)/(62-82) 159/66    Scheduled Meds:    amiodarone  200 mg Oral BID    bimatoprost  1 drop Both Eyes QHS    enoxaparin  40 mg Subcutaneous Daily    ferrous sulfate  325 mg Oral BID    furosemide  20 mg Oral Daily    levothyroxine  50 mcg Oral Before breakfast    lisinopril  40 mg Oral Daily    magnesium oxide  400 mg Oral BID    metoprolol succinate  50 mg Oral Daily    mupirocin   Topical (Top) BID    olopatadine  1 drop Both Eyes BID    pantoprazole  40 mg Oral Daily    potassium, sodium phosphates  1 packet Oral QID (AC & HS)    psyllium  1 packet Oral Daily    solifenacin  10 mg Oral Daily    sucralfate  1 g Oral QID     Continuous Infusions:    PRN Meds: acetaminophen, acetaminophen, dextrose 50%, dextrose 50%, diphenhydrAMINE-zinc acetate 1-0.1%, docusate sodium, glucagon (human recombinant), glucose, glucose, hydrALAZINE, hydrocodone-acetaminophen 10-325mg, hydrocodone-acetaminophen 5-325mg, insulin aspart, ipratropium, morphine, ondansetron, sodium chloride 0.9%, sodium chloride 0.9%, traMADol    Diet: Diet Cardiac  Diet general  Diet renal  Diet Cardiac  Diet Cardiac  Diet general  Diet Cardiac    Trended Lab Data:    Recent Labs  Lab 12/09/17  0815 12/09/17  0816 12/10/17  0614 12/11/17  0511   WBC  --  6.11 5.57 5.25   HGB  --  7.9* 7.9* 8.1*   HCT  --  23.6* 24.1* 24.4*   PLT  --  209 211 202   MCV  --  92 92 92   RDW  --  16.3* 16.2* 16.2*   *  --  136 135*   K 3.4*  --  3.4* 3.7     --  101 100   CO2 25  --  27 29   BUN 27*  --  25* 21   CREATININE 1.0  --  1.0 0.9     --  121* 105   PROT 5.6*  --  5.7* 5.6*   ALBUMIN 2.1*  --  2.2* 2.1*   BILITOT 0.7  --  0.7 0.6   AST 48*  --  44* 51*   ALKPHOS 67  --  69 66   ALT 28  --  25 26       I/O last 3  completed shifts:  In: -   Out: 512 [Urine:511; Stool:1]    Exam:  Gen NAD  Resp unlabored  CV RR  RLE:  Incision c/d/i  EHL/FHL/TA/GS intact  SILT s/s/sp/dp/t  BCR    Impression/Plan:  Alyssarojelio Hastings is a 87 y.o. female with a right femoral neck fracture s/p R hip hemiarthroplasty on 12/5  - WBAT RLE, posterior hip precautions  - PT/OT  - medical management per primary  - recommend DVT ppx upon discharge (ASA 81BID + pepcid 20 BID for 4 weeks)  - awaiting SNF placement

## 2017-12-11 NOTE — PLAN OF CARE
Problem: Patient Care Overview  Goal: Plan of Care Review  Pt on RA with sats of 94%. Patient achieving 500 ml on incentive spirometry. Will continue to monitor.

## 2017-12-11 NOTE — PROGRESS NOTES
faxed updated clinicals to Aleksey Barrera and [facility] admit coordinators to review for admission today.

## 2017-12-11 NOTE — PROGRESS NOTES
Progress Note  [facility]  Admit Date: 12/3/2017   LOS: 8 days   SUBJECTIVE:   Follow-up For: POD 6 right hip hemiarthroplasty and medical management    Patient seen and examined this AM. NAEON. Urinating and working well with PT/OT. Hip pain well controlled.     ROS  Denies chest pain, SOB, f/c, N/V    OBJECTIVE:   Vital Signs (Most Recent)  Temp: 98.1 °F (36.7 °C) (12/11/17 0812)  Pulse: 78 (12/11/17 0812)  Resp: 16 (12/11/17 0812)  BP: (!) 171/79 (12/11/17 0812)  SpO2: 97 % (12/11/17 0455)    I & O (Last 24H):  Intake/Output Summary (Last 24 hours) at 12/11/17 0822  Last data filed at 12/10/17 2333   Gross per 24 hour   Intake                0 ml   Output              512 ml   Net             -512 ml     Wt Readings from Last 3 Encounters:   12/10/17 77.1 kg (169 lb 15.6 oz)   09/27/17 81.2 kg (179 lb)   09/21/17 75.3 kg (166 lb 0.1 oz)       Current Diet Order   Procedures    Diet Cardiac    Diet general    Diet renal    Diet Cardiac    Diet Cardiac        Physical Exam  General: AAOx3. NAD.  HEENT: NCAT. Nearly resolved bilateral ecchymosis under lower eyelids. Poor dentition   CV: RRR. NL S1/S2. No murmurs  Chest: clear to auscultation bilaterally, normal effort  Abd: +BS x 4. Soft. ND/NT. No   Ext: peripheral pulses intact. Lower extremity pitting edema, zflex boots in place bilaterally. Right hip bandaged c/d/i. Right lower extremity below anterior knee with skin abrasion bandaged  Neuro: No focal deficits  Psych: Good judgement and reason. No abnormal behaviors noted    Laboratory Data:  CBC    Recent Labs  Lab 12/09/17  0816 12/10/17  0614 12/11/17  0511   WBC 6.11 5.57 5.25   RBC 2.58* 2.63* 2.64*   HGB 7.9* 7.9* 8.1*   HCT 23.6* 24.1* 24.4*    211 202   MCV 92 92 92   MCH 30.6 30.0 30.7   MCHC 33.5 32.8 33.2     CMP    Recent Labs  Lab 12/09/17  0815 12/10/17  0614 12/11/17  0511   CALCIUM 8.2* 8.1* 8.0*   PROT 5.6* 5.7* 5.6*   * 136 135*   K 3.4* 3.4* 3.7   CO2 25 27 29   CL  100 101 100   BUN 27* 25* 21   CREATININE 1.0 1.0 0.9   ALKPHOS 67 69 66   ALT 28 25 26   AST 48* 44* 51*   BILITOT 0.7 0.7 0.6     POCT-Glucose  POCT Glucose   Date Value Ref Range Status   12/08/2017 132 (H) 70 - 110 mg/dL Final     COAGS    Recent Labs  Lab 12/05/17  0430 12/06/17  0547 12/07/17  0435   INR 1.1 1.1 1.1   APTT 30.1 30.0 31.8       ASSESSMENT/PLAN:   Alyssa Hastings is a 87 y.o. female with PMHx of Hypothyroidism, HTN, POD 6  s/p right femoral neck fracture repair awaiting SNF placement.            Closed displaced fracture of right femoral neck     - 2/2 mechanical fall   - POD 5 right femoral neck fracture repair   -Awaiting placement, [facility] reviewing patient's chart       Hypothyroidism     - continue synthroid 50 mcg          Essential hypertension     - Currently on Lisinopril 40 mg and Metoprolol 25 mg  -BPs 140-180s/70-80s. Will increase Metoprolol to 50 mg     History of CABG         -Per chart check hx of CABG x2          -2D echo on admit, EF 55%, moderate MVR and TVR           -On Lisinopril and beta blocker. Per chart check, allergy to statin      Dispo: Awaiting placement, continue to work with PT/OT, monitor BP        12/11/2017 Jodi Rowan MD  8:22 AM

## 2017-12-12 VITALS
HEART RATE: 66 BPM | HEIGHT: 65 IN | DIASTOLIC BLOOD PRESSURE: 68 MMHG | TEMPERATURE: 98 F | RESPIRATION RATE: 18 BRPM | OXYGEN SATURATION: 93 % | BODY MASS INDEX: 28.32 KG/M2 | SYSTOLIC BLOOD PRESSURE: 151 MMHG | WEIGHT: 170 LBS

## 2017-12-12 LAB
ALBUMIN SERPL BCP-MCNC: 2.2 G/DL
ALP SERPL-CCNC: 69 U/L
ALT SERPL W/O P-5'-P-CCNC: 27 U/L
ANION GAP SERPL CALC-SCNC: 5 MMOL/L
AST SERPL-CCNC: 56 U/L
BASOPHILS # BLD AUTO: 0.03 K/UL
BASOPHILS NFR BLD: 0.5 %
BILIRUB SERPL-MCNC: 0.5 MG/DL
BUN SERPL-MCNC: 20 MG/DL
CALCIUM SERPL-MCNC: 8 MG/DL
CHLORIDE SERPL-SCNC: 99 MMOL/L
CO2 SERPL-SCNC: 31 MMOL/L
CREAT SERPL-MCNC: 0.9 MG/DL
DIFFERENTIAL METHOD: ABNORMAL
EOSINOPHIL # BLD AUTO: 0.2 K/UL
EOSINOPHIL NFR BLD: 2.7 %
ERYTHROCYTE [DISTWIDTH] IN BLOOD BY AUTOMATED COUNT: 16 %
EST. GFR  (AFRICAN AMERICAN): >60 ML/MIN/1.73 M^2
EST. GFR  (NON AFRICAN AMERICAN): 58 ML/MIN/1.73 M^2
GLUCOSE SERPL-MCNC: 112 MG/DL
HCT VFR BLD AUTO: 24.4 %
HGB BLD-MCNC: 7.9 G/DL
LYMPHOCYTES # BLD AUTO: 1 K/UL
LYMPHOCYTES NFR BLD: 15.8 %
MAGNESIUM SERPL-MCNC: 1.6 MG/DL
MCH RBC QN AUTO: 29.7 PG
MCHC RBC AUTO-ENTMCNC: 32.4 G/DL
MCV RBC AUTO: 92 FL
MONOCYTES # BLD AUTO: 0.7 K/UL
MONOCYTES NFR BLD: 11.5 %
NEUTROPHILS # BLD AUTO: 4.2 K/UL
NEUTROPHILS NFR BLD: 69 %
PHOSPHATE SERPL-MCNC: 3.1 MG/DL
PLATELET # BLD AUTO: 224 K/UL
PMV BLD AUTO: 9.4 FL
POTASSIUM SERPL-SCNC: 3.6 MMOL/L
PROT SERPL-MCNC: 5.6 G/DL
RBC # BLD AUTO: 2.66 M/UL
SODIUM SERPL-SCNC: 135 MMOL/L
WBC # BLD AUTO: 6.02 K/UL

## 2017-12-12 PROCEDURE — 84100 ASSAY OF PHOSPHORUS: CPT

## 2017-12-12 PROCEDURE — 97110 THERAPEUTIC EXERCISES: CPT

## 2017-12-12 PROCEDURE — 25000003 PHARM REV CODE 250: Performed by: ORTHOPAEDIC SURGERY

## 2017-12-12 PROCEDURE — 80053 COMPREHEN METABOLIC PANEL: CPT

## 2017-12-12 PROCEDURE — 94761 N-INVAS EAR/PLS OXIMETRY MLT: CPT

## 2017-12-12 PROCEDURE — 25000003 PHARM REV CODE 250: Performed by: STUDENT IN AN ORGANIZED HEALTH CARE EDUCATION/TRAINING PROGRAM

## 2017-12-12 PROCEDURE — 36415 COLL VENOUS BLD VENIPUNCTURE: CPT

## 2017-12-12 PROCEDURE — 83735 ASSAY OF MAGNESIUM: CPT

## 2017-12-12 PROCEDURE — 25000003 PHARM REV CODE 250: Performed by: FAMILY MEDICINE

## 2017-12-12 PROCEDURE — 85025 COMPLETE CBC W/AUTO DIFF WBC: CPT

## 2017-12-12 PROCEDURE — 97530 THERAPEUTIC ACTIVITIES: CPT

## 2017-12-12 PROCEDURE — 25000003 PHARM REV CODE 250

## 2017-12-12 PROCEDURE — 94799 UNLISTED PULMONARY SVC/PX: CPT

## 2017-12-12 RX ORDER — FUROSEMIDE 20 MG/1
20 TABLET ORAL DAILY
Status: DISCONTINUED | OUTPATIENT
Start: 2017-12-12 | End: 2017-12-12 | Stop reason: HOSPADM

## 2017-12-12 RX ORDER — ISOSORBIDE MONONITRATE 30 MG/1
30 TABLET, EXTENDED RELEASE ORAL DAILY
Status: DISCONTINUED | OUTPATIENT
Start: 2017-12-12 | End: 2017-12-12 | Stop reason: HOSPADM

## 2017-12-12 RX ADMIN — FERROUS SULFATE TAB EC 325 MG (65 MG FE EQUIVALENT) 325 MG: 325 (65 FE) TABLET DELAYED RESPONSE at 08:12

## 2017-12-12 RX ADMIN — SUCRALFATE 1 G: 1 TABLET ORAL at 01:12

## 2017-12-12 RX ADMIN — PANTOPRAZOLE SODIUM 40 MG: 40 TABLET, DELAYED RELEASE ORAL at 08:12

## 2017-12-12 RX ADMIN — METOPROLOL SUCCINATE 50 MG: 25 TABLET, EXTENDED RELEASE ORAL at 08:12

## 2017-12-12 RX ADMIN — OLOPATADINE HYDROCHLORIDE 1 DROP: 1 SOLUTION/ DROPS OPHTHALMIC at 08:12

## 2017-12-12 RX ADMIN — ISOSORBIDE MONONITRATE 30 MG: 30 TABLET, EXTENDED RELEASE ORAL at 11:12

## 2017-12-12 RX ADMIN — PSYLLIUM HUSK 1 PACKET: 3.4 POWDER ORAL at 09:12

## 2017-12-12 RX ADMIN — HYDROCODONE BITARTRATE AND ACETAMINOPHEN 1 TABLET: 10; 325 TABLET ORAL at 08:12

## 2017-12-12 RX ADMIN — FUROSEMIDE 20 MG: 20 TABLET ORAL at 11:12

## 2017-12-12 RX ADMIN — LISINOPRIL 40 MG: 20 TABLET ORAL at 08:12

## 2017-12-12 RX ADMIN — AMIODARONE HYDROCHLORIDE 200 MG: 200 TABLET ORAL at 08:12

## 2017-12-12 RX ADMIN — POTASSIUM & SODIUM PHOSPHATES POWDER PACK 280-160-250 MG 1 PACKET: 280-160-250 PACK at 08:12

## 2017-12-12 RX ADMIN — SOLIFENACIN SUCCINATE 10 MG: 5 TABLET, FILM COATED ORAL at 08:12

## 2017-12-12 RX ADMIN — POTASSIUM & SODIUM PHOSPHATES POWDER PACK 280-160-250 MG 1 PACKET: 280-160-250 PACK at 11:12

## 2017-12-12 RX ADMIN — MUPIROCIN: 20 OINTMENT TOPICAL at 08:12

## 2017-12-12 RX ADMIN — LEVOTHYROXINE SODIUM 50 MCG: 50 TABLET ORAL at 08:12

## 2017-12-12 RX ADMIN — SUCRALFATE 1 G: 1 TABLET ORAL at 08:12

## 2017-12-12 RX ADMIN — SUCRALFATE 1 G: 1 TABLET ORAL at 11:12

## 2017-12-12 NOTE — PLAN OF CARE
Problem: Patient Care Overview  Goal: Plan of Care Review  Outcome: Ongoing (interventions implemented as appropriate)  Patient on RA, no respiratory distress noted. Pt does IS fairly well- 500. Will continue to monitor.

## 2017-12-12 NOTE — PROGRESS NOTES
Pt being discharged to [facility] nursing [facility]. Report called to Mary at [facility]. Pt IV and telemetry discontinued. Pt leaving per [facility], pt diaper and gown changed. Safety maintained. Pt leaving off unit.

## 2017-12-12 NOTE — PROGRESS NOTES
Dr. JULISSA Real called at this time, gave order to hold one time dose of benadryl. She stated that the on call md would be come soon to assess pt.

## 2017-12-12 NOTE — PLAN OF CARE
Follow-up With  Details  Why  Contact Info   Juan Simpson MD  On 12/28/2017  @9:45am  [address]  [address]  [place] [id number] [phone]   Aleksey Ahuja    [place] [id number] [facility] [place] [address]  [phone]

## 2017-12-12 NOTE — PROGRESS NOTES
Interval:   NAEO. Denies numbness/paresthesias. Awaiting SNF placement. Comfortable this morning.    Vitals:  Temp:  [97.1 °F (36.2 °C)-98.5 °F (36.9 °C)] 97.6 °F (36.4 °C)  Pulse:  [65-81] 67  Resp:  [16-19] 18  SpO2:  [94 %-99 %] 96 %  BP: (133-207)/(59-86) 162/70    Scheduled Meds:    amiodarone  200 mg Oral BID    bimatoprost  1 drop Both Eyes QHS    diphenhydrAMINE  50 mg Intravenous Once    enoxaparin  40 mg Subcutaneous Daily    ferrous sulfate  325 mg Oral BID    levothyroxine  50 mcg Oral Before breakfast    lisinopril  40 mg Oral Daily    metoprolol succinate  50 mg Oral Daily    mupirocin   Topical (Top) BID    olopatadine  1 drop Both Eyes BID    pantoprazole  40 mg Oral Daily    potassium, sodium phosphates  1 packet Oral QID (AC & HS)    psyllium  1 packet Oral Daily    solifenacin  10 mg Oral Daily    sucralfate  1 g Oral QID     Continuous Infusions:    PRN Meds: acetaminophen, acetaminophen, dextrose 50%, dextrose 50%, diphenhydrAMINE-zinc acetate 1-0.1%, docusate sodium, glucagon (human recombinant), glucose, glucose, hydrocodone-acetaminophen 10-325mg, hydrocodone-acetaminophen 5-325mg, insulin aspart, ipratropium, morphine, ondansetron, sodium chloride 0.9%, sodium chloride 0.9%, traMADol    Diet: Diet Cardiac  Diet general  Diet renal  Diet Cardiac  Diet Cardiac  Diet general  Diet Cardiac    Trended Lab Data:    Recent Labs  Lab 12/10/17  0614 12/11/17  0511 12/12/17  0427   WBC 5.57 5.25 6.02   HGB 7.9* 8.1* 7.9*   HCT 24.1* 24.4* 24.4*    202 224   MCV 92 92 92   RDW 16.2* 16.2* 16.0*    135* 135*   K 3.4* 3.7 3.6    100 99   CO2 27 29 31*   BUN 25* 21 20   CREATININE 1.0 0.9 0.9   * 105 112*   PROT 5.7* 5.6* 5.6*   ALBUMIN 2.2* 2.1* 2.2*   BILITOT 0.7 0.6 0.5   AST 44* 51* 56*   ALKPHOS 69 66 69   ALT 25 26 27       I/O last 3 completed shifts:  In: -   Out: 1174 [Urine:854; Stool:320]    Exam:  Gen NAD  Resp unlabored  CV RR  RLE:  Incision  c/d/i  EHL/FHL/TA/GS intact  SILT s/s/sp/dp/t  BCR    Impression/Plan:  Alyssarojelio Hastings is a 87 y.o. female with a right femoral neck fracture s/p R hip hemiarthroplasty on 12/5  - WBAT RLE, posterior hip precautions  - PT/OT  - medical management per primary  - recommend DVT ppx upon discharge (ASA 81BID + pepcid 20 BID for 4 weeks)  - awaiting SNF placement

## 2017-12-12 NOTE — PLAN OF CARE
Future Appointments  Date Time Provider Department Center   1/10/2018 2:30 PM Isaias Elizabeth MD [facility]           12/12/17 7815   Final Note   Assessment Type Final Discharge Note   Discharge Disposition SNF   What phone number can be called within the next 1-3 days to see how you are doing after discharge? [phone]   Hospital Follow Up  Appt(s) scheduled? Yes   Discharge plans and expectations educations in teach back method with documentation complete? Yes   Right Care Referral Info   Post Acute Recommendation SNF / Sub-Acute Rehab   Referral Type SNF   Facility Name [facility]

## 2017-12-12 NOTE — PT/OT/SLP PROGRESS
Physical Therapy Treatment    Patient Name:  Alyssa Hastings   MRN:  [id number]    Recommendations:     Discharge Recommendations:  nursing facility, skilled   Discharge Equipment Recommendations: bedside commode   Barriers to discharge: Decreased caregiver support    Assessment:     Alyssa Hastings is a 87 y.o. female admitted with a medical diagnosis of Closed displaced fracture of right femoral neck.  She presents with the following impairments/functional limitations:  weakness, impaired endurance, impaired self care skills, impaired functional mobilty, gait instability, impaired balance, decreased upper extremity function, decreased lower extremity function, decreased safety awareness, decreased ROM, orthopedic precautions, impaired skin.  Pt would continue to benefit from P.T. To address impairments listed below.    Rehab Prognosis:  Good; patient would benefit from acute skilled PT services to address these deficits and reach maximum level of function.      Recent Surgery: Procedure(s) (LRB):  HEMIARTHROPLASTY - RIGHT HIP (Right) 7 Days Post-Op    Plan:     During this hospitalization, patient to be seen BID to address the above listed problems via gait training, therapeutic activities, therapeutic exercises  · Plan of Care Expires:  01/06/18   Plan of Care Reviewed with: patient    Subjective     Communicated with RN prior to session.  Patient found supine upon PT entry to room, agreeable to treatment.        Patient comments:  Pt agreed to tx.  Pain/Comfort:  · Pain Rating 1:  (no c/o pain during tx)  · Pain Addressed 1:  (no c/o pain during tx)    Patients cultural, spiritual, Mandaen conflicts given the current situation: None reported    Objective:     Patient found with: telemetry     General Precautions: Standard, fall   Orthopedic Precautions:RLE weight bearing as tolerated, RLE posterior precautions   Braces:       Functional Mobility:  · Bed Mobility:     · Rolling Left:  stand by assistance and  pillow between LEs to maintain R hip precautions with use of B/R.        Rolling Right: stand by assistance with B/R with use of B/R    AM-PAC 6 CLICK MOBILITY  Turning over in bed (including adjusting bedclothes, sheets and blankets)?: 3  Sitting down on and standing up from a chair with arms (e.g., wheelchair, bedside commode, etc.): 3  Moving from lying on back to sitting on the side of the bed?: 3  Moving to and from a bed to a chair (including a wheelchair)?: 3  Need to walk in hospital room?: 3  Climbing 3-5 steps with a railing?: 1  Total Score: 16       Therapeutic Activities and Exercises:   Bed mobility as above to assist pt with cleaning and adjusting bed pain and draw sheet.  BLE therex: AP, heelslides, hip ABD/ADd, QS, and glut sets x 15 reps with CGA/Louisa with hip precautions intact.  Reviewed hip precautions and pt knew 2 out of 3 precautions.  Tx ended secondary to ambulance arriving to take pt to SNF.    Patient left stand by assistance and pillow between LEs to maintain R hip precautions with all lines intact, call button in reach and RN notified..    GOALS:    Physical Therapy Goals        Problem: Physical Therapy Goal    Goal Priority Disciplines Outcome Goal Variances Interventions   Physical Therapy Goal     PT/OT, PT      Description:  Goals to be met by: 18     Patient will increase functional independence with mobility by performin) sit <>sup with SBA  2) Sit <>stand with CGA X1  3) ambulate 75 feet with CGA X1                     Time Tracking:     PT Received On: 17  PT Start Time: 1320     PT Stop Time: 1344  PT Total Time (min): 24 min     Billable Minutes: Therapeutic Activity 14 and Therapeutic Exercises 10    Treatment Type: Treatment  PT/PTA: PTA     PTA Visit Number: 5     Ayanna Tillman, ABHIJEET  2017

## 2017-12-12 NOTE — PROGRESS NOTES
"Came to assess the patient.  Breathing comfortably and talking without difficulty. Mouth is held in a O-shape and pt explains how food has felt strange in her mouth all day.  She is much more concerned with a sensation in her lower abdomen, pelvis that makes her concerned for a UTI.    Ordered UA, urine culture.    Has not yet received benadryl for itching and feeling of a "full tongue" she thought to be associated with an allergic reaction to something.  She does have approx 20 allergies listed, including potassium. Cause thought to be hydralazine or Lovenox as other meds had been given through the day without a problem.    Passed on information to night resident and spoke to nurse.    "

## 2017-12-12 NOTE — PROGRESS NOTES
Updated clinicals and facility transfer orders faxed to Annabella @ [facility]      Annabella has received  SNF auth from [facility].     [facility] placed on will call for  from hospital to [facility] to follow up.      Tobi Anderson updated of above.      update: 12:30pm   okay to admit received per Annabella. Pt going to room P115 bedside RN to call report to [phone].     [facility] released for  with  In the hour.    Transfer packet left at nurses station    Follow-up With  Details  Why  Contact Info   Juan Simpson MD  On 12/28/2017  @9:45am  [address]  [address]  [place] [address]  [phone]   [facility]    SNF  [address] [facility] [address]  [phone]

## 2017-12-12 NOTE — PROGRESS NOTES
As per  Dr. Santiago's request to notify him if pt complained of any adverse reactions to the scheduled hydralazine. He was notified that the pt was noted to have facial flushing with  very red warm cheeks, no other symptoms noted or reported, no SOB. Symptom  noticed about 45 min after med was taken. Dr. Santiago states he will d/c the hydralazine, scheduled and prn.

## 2017-12-12 NOTE — PLAN OF CARE
Problem: Patient Care Overview  Goal: Plan of Care Review  Outcome: Ongoing (interventions implemented as appropriate)  Pt on RA with sats of 99%. Patient achieving 500 ml on incentive spirometer. Will continue to monitor.

## 2017-12-13 ENCOUNTER — PATIENT OUTREACH (OUTPATIENT)
Dept: ADMINISTRATIVE | Facility: CLINIC | Age: 82
End: 2017-12-13

## 2017-12-13 NOTE — PT/OT/SLP DISCHARGE
Physical Therapy Discharge Summary    Name: Alyssa Hastings  MRN: [id number]   Principal Problem: Closed displaced fracture of right femoral neck     Patient Discharged from acute Physical Therapy on 2017.  Please refer to prior PT noted date on 2017 for functional status.     Assessment:     Patient appropriate for care in another setting.    Objective:     GOALS:    Physical Therapy Goals        Problem: Physical Therapy Goal    Goal Priority Disciplines Outcome Goal Variances Interventions   Physical Therapy Goal     PT/OT, PT Ongoing (interventions implemented as appropriate)     Description:  Goals to be met by: 18     Patient will increase functional independence with mobility by performin) sit <>sup with SBA  2) Sit <>stand with CGA X1  3) ambulate 75 feet with CGA X1                     Reasons for Discontinuation of Therapy Services  Transfer to alternate level of care.      Plan:     Patient Discharged to: Skilled Nursing Facility.    Kenton Mendez, PT  2017

## 2017-12-14 DIAGNOSIS — N39.0 URINARY TRACT INFECTION WITHOUT HEMATURIA, SITE UNSPECIFIED: Primary | ICD-10-CM

## 2017-12-14 PROBLEM — E03.4 HYPOTHYROIDISM DUE TO ACQUIRED ATROPHY OF THYROID: Status: ACTIVE | Noted: 2017-12-14

## 2017-12-14 RX ORDER — CIPROFLOXACIN 500 MG/1
500 TABLET ORAL 2 TIMES DAILY
Qty: 14 TABLET | Refills: 0 | Status: SHIPPED | OUTPATIENT
Start: 2017-12-14 | End: 2017-12-19 | Stop reason: SDUPTHER

## 2017-12-14 NOTE — PT/OT/SLP DISCHARGE
Occupational Therapy Discharge Summary    Alyssa Hastings  MRN: [id number]   Principal Problem: Closed displaced fracture of right femoral neck      Patient Discharged from acute Occupational Therapy on 12/12.  Please refer to prior OT note dated 12/11 for functional status.    Assessment:      Patient appropriate for care in another setting.    Objective:     GOALS:    Occupational Therapy Goals     Not on file          Multidisciplinary Problems (Resolved)        Problem: Occupational Therapy Goal    Goal Priority Disciplines Outcome Interventions   Occupational Therapy Goal   (Resolved)     OT, PT/OT Outcome(s) achieved    Description:  Goals to be met by: 01/06/18     Patient will increase functional independence with ADLs by performing:    LE Dressing with Moderate Assistance.  Grooming while standing at sink with Stand-by Assistance.  Toileting from toilet with Stand-by Assistance for hygiene and clothing management.   Toilet transfer to toilet with Stand-by Assistance.  Upper extremity exercise program x10 reps per handout, with independence.                      Reasons for Discontinuation of Therapy Services  Transfer to alternate level of care.      Plan:     Patient Discharged to: Skilled Nursing Facility    Ramiro Horne OT  12/14/2017

## 2017-12-14 NOTE — PROGRESS NOTES
Urine cultures came back positive for (prelim result currently) Pseudomonas, GNR-lactose fermenting.  Ordered Cipro 500 mg BID x 7 days.  Called [facility] ([facility]) [phone] twice yesterday and left voice mail and once today and left voice mail in attempt to ensure the nursing staff/ MDs at the facility are aware.      Also called son [phone] and informed him of the culture results.  He will attempt to communicate with the SNF as well.    5:58 pm  12/14/2017

## 2017-12-15 LAB
BACTERIA UR CULT: NORMAL
BACTERIA UR CULT: NORMAL

## 2017-12-18 ENCOUNTER — TELEPHONE (OUTPATIENT)
Dept: FAMILY MEDICINE | Facility: HOSPITAL | Age: 82
End: 2017-12-18

## 2017-12-18 NOTE — TELEPHONE ENCOUNTER
Patient's son is calling concerning ciprofloxacin 500mg.   Pt is now at [facility] in [place] and needs order for cipro before the facility will start the medication.

## 2017-12-18 NOTE — TELEPHONE ENCOUNTER
----- Message from Danielle Benavides sent at 12/18/2017 10:35 AM CST -----  Contact: Pt son Luis Smith is requesting for nurse to call him back says he missed a phone call on Friday and was returning the call    Luis can be reached at [phone]    Thanks

## 2017-12-19 RX ORDER — CIPROFLOXACIN 500 MG/1
500 TABLET ORAL 2 TIMES DAILY
Qty: 14 TABLET | Refills: 0 | Status: SHIPPED | OUTPATIENT
Start: 2017-12-19

## 2017-12-19 RX ORDER — CIPROFLOXACIN 500 MG/1
500 TABLET ORAL 2 TIMES DAILY
Qty: 14 TABLET | Refills: 0 | Status: SHIPPED | OUTPATIENT
Start: 2017-12-19 | End: 2017-12-19 | Stop reason: SDUPTHER

## 2017-12-19 NOTE — PROGRESS NOTES
Called [facility] again in an attempt to connect with them regarding the abx for pt's positive cultures (now pseudomonas and klebsiella- both sensitive to Cipro).  After leaving another voice mail, I asked to speak to a supervisor.  This time I was able to make progress.    Faxing prescription over to [phone]  Will notify son that it is done.

## 2017-12-20 ENCOUNTER — HOSPITAL ENCOUNTER (EMERGENCY)
Facility: HOSPITAL | Age: 82
Discharge: HOME | End: 2017-12-21
Attending: EMERGENCY MEDICINE
Payer: COMMERCIAL
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
## 2017-12-20 DIAGNOSIS — M25.579 ANKLE PAIN: ICD-10-CM
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
## 2017-12-20 DIAGNOSIS — R74.01 TRANSAMINITIS: ICD-10-CM

## 2017-12-20 DIAGNOSIS — M25.572 LEFT ANKLE PAIN: ICD-10-CM

## 2017-12-20 LAB
ALBUMIN SERPL BCP-MCNC: 2.3 G/DL
ALP SERPL-CCNC: 84 U/L
ALT SERPL W/O P-5'-P-CCNC: 60 U/L
ANION GAP SERPL CALC-SCNC: 7 MMOL/L
AST SERPL-CCNC: 139 U/L
BASOPHILS # BLD AUTO: 0.05 K/UL
BASOPHILS NFR BLD: 0.7 %
BILIRUB SERPL-MCNC: 0.5 MG/DL
BUN SERPL-MCNC: 12 MG/DL
CALCIUM SERPL-MCNC: 7.8 MG/DL
CHLORIDE SERPL-SCNC: 100 MMOL/L
CO2 SERPL-SCNC: 27 MMOL/L
CREAT SERPL-MCNC: 1 MG/DL
DIFFERENTIAL METHOD: ABNORMAL
EOSINOPHIL # BLD AUTO: 0.2 K/UL
EOSINOPHIL NFR BLD: 3.3 %
ERYTHROCYTE [DISTWIDTH] IN BLOOD BY AUTOMATED COUNT: 16.6 %
EST. GFR  (AFRICAN AMERICAN): 58.5 ML/MIN/1.73 M^2
EST. GFR  (NON AFRICAN AMERICAN): 50.8 ML/MIN/1.73 M^2
GLUCOSE SERPL-MCNC: 100 MG/DL
HCT VFR BLD AUTO: 26.7 %
HGB BLD-MCNC: 8.5 G/DL
IMM GRANULOCYTES # BLD AUTO: 0.03 K/UL
IMM GRANULOCYTES NFR BLD AUTO: 0.4 %
LYMPHOCYTES # BLD AUTO: 1.1 K/UL
LYMPHOCYTES NFR BLD: 15.6 %
MCH RBC QN AUTO: 30.1 PG
MCHC RBC AUTO-ENTMCNC: 31.8 G/DL
MCV RBC AUTO: 95 FL
MONOCYTES # BLD AUTO: 0.6 K/UL
MONOCYTES NFR BLD: 9.1 %
NEUTROPHILS # BLD AUTO: 5 K/UL
NEUTROPHILS NFR BLD: 70.9 %
NRBC BLD-RTO: 0 /100 WBC
PLATELET # BLD AUTO: 165 K/UL
PMV BLD AUTO: 10.5 FL
POTASSIUM SERPL-SCNC: 3.2 MMOL/L
PROT SERPL-MCNC: 5.8 G/DL
RBC # BLD AUTO: 2.82 M/UL
SODIUM SERPL-SCNC: 134 MMOL/L
WBC # BLD AUTO: 7.05 K/UL

## 2017-12-20 PROCEDURE — 99285 EMERGENCY DEPT VISIT HI MDM: CPT | Mod: 25

## 2017-12-20 PROCEDURE — 93005 ELECTROCARDIOGRAM TRACING: CPT

## 2017-12-20 PROCEDURE — 85025 COMPLETE CBC W/AUTO DIFF WBC: CPT

## 2017-12-20 PROCEDURE — 99285 EMERGENCY DEPT VISIT HI MDM: CPT | Mod: ,,, | Performed by: EMERGENCY MEDICINE

## 2017-12-20 PROCEDURE — 25000003 PHARM REV CODE 250: Performed by: EMERGENCY MEDICINE

## 2017-12-20 PROCEDURE — 80053 COMPREHEN METABOLIC PANEL: CPT

## 2017-12-20 PROCEDURE — 93010 ELECTROCARDIOGRAM REPORT: CPT | Mod: ,,, | Performed by: INTERNAL MEDICINE

## 2017-12-20 RX ORDER — POTASSIUM CHLORIDE 750 MG/1
10 CAPSULE, EXTENDED RELEASE ORAL
Status: COMPLETED | OUTPATIENT
Start: 2017-12-20 | End: 2017-12-20

## 2017-12-20 RX ADMIN — POTASSIUM CHLORIDE 10 MEQ: 750 CAPSULE, EXTENDED RELEASE ORAL at 11:12

## 2017-12-21 VITALS
OXYGEN SATURATION: 95 % | BODY MASS INDEX: 30.12 KG/M2 | WEIGHT: 170 LBS | TEMPERATURE: 98 F | HEART RATE: 60 BPM | HEIGHT: 63 IN | DIASTOLIC BLOOD PRESSURE: 81 MMHG | SYSTOLIC BLOOD PRESSURE: 188 MMHG | RESPIRATION RATE: 16 BRPM

## 2017-12-21 NOTE — ED PROVIDER NOTES
Encounter Date: 12/20/2017    SCRIBE #1 NOTE: I, Alejandra Madrigal, am scribing for, and in the presence of,  Dr. Horton. I have scribed the following portions of the note - the EKG reading.       History     Chief Complaint   Patient presents with    Ankle Pain     Ankle pain and skin tears post fall.      HPI     87 yr old CF who presents to the ED after a fall. She was getting out of her wheelchair, when she hit her left ankle on something, and landed on her leg. She does not think that she hit her head but is not totally sure. She currently complains of some mild pain in her left ankle, but denies pain in anywhere else. She was not having any chest pain, nausea, vomiting, shortness of breath, light headedness or dizziness prior to the event. Her pain is sharp and does not radiate.     Review of patient's allergies indicates:   Allergen Reactions    Actos [pioglitazone]     Amlodipine     Bentyl [dicyclomine]     Benzocaine     Declomycin [demeclocycline]     Doxycycline     Erythropoietin analogues     Gabapentin     Hydrocodone     Lipitor [atorvastatin]     Losartan     Metronidazole     Nortriptyline     Oxybutynin     Pcn [penicillins]     Potassium     Pravachol [pravastatin]     Procaine     Promethazine     Propranolol     Ramipril     Sulfa (sulfonamide antibiotics)      Past Medical History:   Diagnosis Date    Coronary artery disease     Diabetes mellitus     GERD (gastroesophageal reflux disease)     Glaucoma     Hypertension     Stroke     Thyroid disease      Past Surgical History:   Procedure Laterality Date    APPENDECTOMY      CARDIAC SURGERY      CHOLECYSTECTOMY      EYE SURGERY      HYSTERECTOMY       Family History   Problem Relation Age of Onset    Prostate cancer Neg Hx     Kidney disease Neg Hx      Social History   Substance Use Topics    Smoking status: Never Smoker    Smokeless tobacco: Never Used    Alcohol use No     Review of Systems    Constitutional: Negative for activity change and appetite change.   HENT: Negative for congestion and ear pain.    Eyes: Negative for pain and redness.   Respiratory: Negative for cough and shortness of breath.    Cardiovascular: Negative for chest pain and leg swelling.   Gastrointestinal: Negative for abdominal distention and abdominal pain.   Genitourinary: Negative for difficulty urinating and dysuria.   Musculoskeletal: Negative for arthralgias and back pain.   Skin: Negative for color change and pallor.   Neurological: Negative for light-headedness and headaches.   All other systems reviewed and are negative.      Physical Exam     Initial Vitals [12/20/17 1824]   BP Pulse Resp Temp SpO2   (!) 183/56 66 16 97.6 °F (36.4 °C) 98 %      MAP       98.33         Physical Exam    Nursing note and vitals reviewed.  Constitutional: She appears well-developed and well-nourished. She is not diaphoretic. No distress.   HENT:   Head: Normocephalic and atraumatic.   Right Ear: External ear normal.   Left Ear: External ear normal.   Mouth/Throat: No oropharyngeal exudate.   Mild redness noted to the right cheek however per pt and family has been there for a long time   Eyes: Conjunctivae and EOM are normal. Pupils are equal, round, and reactive to light.   Neck: Normal range of motion. Neck supple. No JVD present.   Cardiovascular: Normal rate, regular rhythm, normal heart sounds and intact distal pulses. Exam reveals no friction rub.    No murmur heard.  Pulmonary/Chest: Breath sounds normal. No stridor. No respiratory distress. She has no wheezes. She has no rales.   Abdominal: Soft. She exhibits no distension. There is no tenderness. There is no rebound.   Musculoskeletal: Normal range of motion. She exhibits tenderness. She exhibits no edema.   Mild TTP of the left medial malleolus, small 2-3 cm abrasion noted to the left shin, no active bleeding noted. No swelling noted. Good ROM about the ankles bilaterally, full  strength in the ankles. Incision on the right lateral thigh, that is clean/dry/intact, with mild tenderness that is the same is her baseline pain. No Midline back TTP. Full ROM of the back. No stepoffs or deformities noted   Neurological: She is alert and oriented to person, place, and time. She has normal strength. No cranial nerve deficit.   CN II - XII grossly intact, no focal neurological deficit noted, no tremor noted, AOx4, no signs of any altered mental status   Skin: No rash and no abscess noted. No erythema. No pallor.         ED Course   Procedures  Labs Reviewed   COMPREHENSIVE METABOLIC PANEL - Abnormal; Notable for the following:        Result Value    Sodium 134 (*)     Potassium 3.2 (*)     Calcium 7.8 (*)     Total Protein 5.8 (*)     Albumin 2.3 (*)      (*)     ALT 60 (*)     Anion Gap 7 (*)     eGFR if  58.5 (*)     eGFR if non  50.8 (*)     All other components within normal limits   CBC W/ AUTO DIFFERENTIAL - Abnormal; Notable for the following:     RBC 2.82 (*)     Hemoglobin 8.5 (*)     Hematocrit 26.7 (*)     MCHC 31.8 (*)     RDW 16.6 (*)     Lymph% 15.6 (*)     All other components within normal limits     EKG Readings: (Independently Interpreted)   Normal Sinus rhythm with 1st degree AV block, letft axis deviation with ST segment flattening throughout at 62 bpm.      Imaging Results          CT Head Without Contrast (Final result)  Result time 12/21/17 00:13:03    Final result by Simon Melgoza MD (12/21/17 00:13:03)                 Impression:        No acute intracranial abnormalities.    Remote basal ganglia lacunar infarcts, unchanged from prior.      Stable senescent changes.      Electronically signed by: SIMON MELGOZA MD  Date:     12/21/17  Time:    00:13              Narrative:    History:     Comparison: September 7, 2017    Technique:    Axial images of the brain were obtained at 5-mm intervals from the skull base to the vertex without  the administration of contrast.    Findings:    Remote basal ganglia lacunar infarcts, unchanged from prior.  There is no evidence of acute infarct, hemorrhage, or mass.  There is ventricular and sulcal enlargement consistent with generalized atrophy.  No mass-effect or midline shift.  There are no abnormal extra-axial fluid collections.  Moderate confluent decreased supratentorial white matter attenuation most likely related to chronic nonspecific small vessel disease.    The paranasal sinuses and mastoid air cells are clear.      The calvarium appears intact.  .                             X-Ray Ankle Complete Left (Final result)  Result time 12/20/17 22:03:33    Final result by Naif Whitley MD (12/20/17 22:03:33)                 Impression:      No acute osseous abnormality identified.      Electronically signed by: NAIF WHITLEY MD  Date:     12/20/17  Time:    22:03              Narrative:    Left ankle 3 views.  Comparison: None.    No evidence of acute fracture, dislocation, or osseous destructive process.  Degenerative changes are seen with small corticated ossifications seen at the distal aspect of the medial malleolus.  Ankle mortise is maintained.  Prominent posterior and plantar calcaneal spurring is visualized.  Vascular calcifications are noted within the lower leg.                              HOIII MDM  A/P: 87 yr old with a fall. ddx includes but is not limited to a syncopal event, arrhythmia, mechanical fall, SAH, SDH. Work up - CBC, CMP, EKG, CT head, ankle x ray. Results significant for no leukocytosis, H/H 8.5/26.7. Currently pending other results.   Oliver Dailey PGY-3 [employer] [facility]  12/20/2017 10:05 PM    Update:   No signs of any electrolyte abnormality. EKG shows no signs of any ST elevation, depression or T wave inversion. He is at 62 BPM. Ankle film shows no signs of any fracture or dislocation. Pending Head CT.   Oliver BoothtoneyJuan Antonio PGY-3 [employer] [facility]  12/20/2017 10:30 PM    Update:   CMP shows  signs of elevated AST and ALT, pt has recently been worked up for liver metastasis/malignancy. She was supposed to have a biopsy, however just prior to the biopsy she had the fall, that caused her to have a right femoral neck fracture. Will ensure that she follows up with her PCP for this. She states that when she fell, she landed on her left side, and did not hit her right side at all. She is not having any increased pain in her right lower extremity.   Oliver Dailey PGY-3 [employer]  12/20/2017 10:55 PM       X-Rays:   Independently Interpreted Readings:   Head CT: Evidence of previous stroke at the internal capsule noted without evidence of acute injury, mass effect, or intracranial hemorrhage.   Other Readings:  Left ankle: No evidence of fracture identified      Medical Decision Making:   History:   Old Medical Records: I decided to obtain old medical records.  Independently Interpreted Test(s):   I have ordered and independently interpreted EKG Reading(s) - see prior notes  Clinical Tests:   Lab Tests: Ordered and Reviewed  Radiological Study: Ordered and Reviewed  Medical Tests: Ordered and Reviewed            Scribe Attestation:   Scribe #1: I performed the above scribed service and the documentation accurately describes the services I performed. I attest to the accuracy of the note.    Attending Attestation:   Physician Attestation Statement for Resident:  As the supervising MD   Physician Attestation Statement: I have personally seen and examined this patient.   I agree with the above history. -: 87-year-old woman with history of recent hip surgery as well as liver mass presents from skilled nursing for evaluation of ankle pain after a fall earlier today.   As the supervising MD I agree with the above PE.    As the supervising MD I agree with the above treatment, course, plan, and disposition.  I have reviewed and agree with the residents interpretation of the following: lab data, CT scans, EKG and x-rays.   I have reviewed the following: old records at this facility.                    ED Course      Clinical Impression:   The primary encounter diagnosis was Fall. Diagnoses of Ankle pain and Transaminitis were also pertinent to this visit.    Disposition:   Disposition: Discharged  Condition: Stable                        Noe Horton MD  12/21/17 0043

## 2017-12-21 NOTE — ED NOTES
Pain: ankle pain 9/10     Psychosocial: Patient is calm and cooperative. Patients insight and judgement are appropriate to situation. Appears clean, well maintained, with clothing appropriate to environment. No evidence of delusions, hallucinations, or psychosis.    Neuro: Eyes open spontaneously. Awake, alert, oriented x 4. Speech clear and appropriate. Tolerating saliva secretions well. Able to follow commands, demonstrating ability to actively and appropriately communicate within context of current conversation. Symmetrical facial muscles. Moving all extremities well with no noted weakness. Adequate muscle tone present. Movement is purposeful. No evidence of impaired sensation. Responds to external stimuli with appropriate reflexes.     Airway: Bilateral chest rise and fall. RR regular and non-labored. Air entry patent and clear x 5 lobes of the lungs. No crepitus or subcutaneous emphysema noted on palpation.     Circulatory: Skin warm, dry, and pink. Apical and radial pulses strong and regular. Capillary refill/skin blanching less than 3 seconds to distal of 4 extremities. Placed on CM in NSR without ectopy.    Abdomen: Abdomen obese, soft and non-distended. Positive normo-active bowel sounds x 4 quadrants.     Urinary: Patient reports routine urination without pain, frequency, or urgency. Voids independently. Reports urine appears jun/yellow in color.    Extremities: pain swelling heat noted to right ankle    Skin: Intact with no bruising/discolorations noted.

## 2017-12-21 NOTE — ED TRIAGE NOTES
87 year old female pt presents to the ed for a fall in which she injured her right ankle. Pt denies any loc and has a hx of falls.pt is awake alert and oriented x 3. Pt denies any chest pain and sob. Pt also still has sensation to all extremities.

## 2017-12-29 NOTE — PROGRESS NOTES
Assessment /Plan     For exam results, see Encounter Report.    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 Old HRVO OS  ? NA-AION OS  + Sup PRP  Pale Nerve OS      CCT  [id number]    Mid-low teens    Both eyes  Dorz BID  Combigan BID  Lumigan q day --> Xal q day  Pataday q day    Right eye  SP [facility] [place] @ 2004    PC IOL OU  Observe    Bilateral ARMD  Dry  Dr Howell follows  [facility] Chart routine with understanding    Plan  RTC 6 months IOP / DFE and OCT RNFL  Sooner prn with good understanding

## 2018-01-02 ENCOUNTER — HOSPITAL ENCOUNTER (EMERGENCY)
Facility: OTHER | Age: 83
Discharge: HOME | End: 2018-01-02
Attending: EMERGENCY MEDICINE
Payer: MEDICARE

## 2018-01-02 VITALS
SYSTOLIC BLOOD PRESSURE: 200 MMHG | OXYGEN SATURATION: 98 % | WEIGHT: 179 LBS | DIASTOLIC BLOOD PRESSURE: 84 MMHG | HEIGHT: 63 IN | BODY MASS INDEX: 31.71 KG/M2 | RESPIRATION RATE: 18 BRPM | HEART RATE: 65 BPM | TEMPERATURE: 98 F

## 2018-01-02 DIAGNOSIS — K62.89 RECTAL PAIN: ICD-10-CM
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
## 2018-01-02 DIAGNOSIS — E87.6 HYPOKALEMIA: ICD-10-CM

## 2018-01-02 DIAGNOSIS — R41.0 DELIRIUM: ICD-10-CM

## 2018-01-02 LAB
ANION GAP SERPL CALC-SCNC: 10 MMOL/L
BILIRUB UR QL STRIP: NEGATIVE
BUN SERPL-MCNC: 18 MG/DL
CALCIUM SERPL-MCNC: 8.4 MG/DL
CHLORIDE SERPL-SCNC: 96 MMOL/L
CK SERPL-CCNC: 40 U/L
CLARITY UR: CLEAR
CO2 SERPL-SCNC: 32 MMOL/L
COLOR UR: YELLOW
CREAT SERPL-MCNC: 1.2 MG/DL
EST. GFR  (AFRICAN AMERICAN): 47 ML/MIN/1.73 M^2
EST. GFR  (NON AFRICAN AMERICAN): 41 ML/MIN/1.73 M^2
GLUCOSE SERPL-MCNC: 99 MG/DL
GLUCOSE UR QL STRIP: NEGATIVE
HGB UR QL STRIP: NEGATIVE
KETONES UR QL STRIP: NEGATIVE
LEUKOCYTE ESTERASE UR QL STRIP: NEGATIVE
MAGNESIUM SERPL-MCNC: 1.4 MG/DL
NITRITE UR QL STRIP: NEGATIVE
PH UR STRIP: 6 [PH] (ref 5–8)
POTASSIUM SERPL-SCNC: 2.3 MMOL/L
PROT UR QL STRIP: ABNORMAL
SODIUM SERPL-SCNC: 138 MMOL/L
SP GR UR STRIP: 1.01 (ref 1–1.03)
URN SPEC COLLECT METH UR: ABNORMAL
UROBILINOGEN UR STRIP-ACNC: NEGATIVE EU/DL

## 2018-01-02 PROCEDURE — 96365 THER/PROPH/DIAG IV INF INIT: CPT

## 2018-01-02 PROCEDURE — 25000003 PHARM REV CODE 250: Performed by: EMERGENCY MEDICINE

## 2018-01-02 PROCEDURE — 87086 URINE CULTURE/COLONY COUNT: CPT

## 2018-01-02 PROCEDURE — 80048 BASIC METABOLIC PNL TOTAL CA: CPT

## 2018-01-02 PROCEDURE — 81003 URINALYSIS AUTO W/O SCOPE: CPT

## 2018-01-02 PROCEDURE — 83735 ASSAY OF MAGNESIUM: CPT

## 2018-01-02 PROCEDURE — 63600175 PHARM REV CODE 636 W HCPCS: Performed by: EMERGENCY MEDICINE

## 2018-01-02 PROCEDURE — 82550 ASSAY OF CK (CPK): CPT

## 2018-01-02 PROCEDURE — 99285 EMERGENCY DEPT VISIT HI MDM: CPT | Mod: 25

## 2018-01-02 RX ORDER — TRAMADOL HYDROCHLORIDE 50 MG/1
50 TABLET ORAL
Status: COMPLETED | OUTPATIENT
Start: 2018-01-02 | End: 2018-01-02

## 2018-01-02 RX ORDER — POTASSIUM CHLORIDE 7.45 MG/ML
10 INJECTION INTRAVENOUS
Status: COMPLETED | OUTPATIENT
Start: 2018-01-02 | End: 2018-01-02

## 2018-01-02 RX ADMIN — POTASSIUM CHLORIDE 10 MEQ: 10 INJECTION, SOLUTION INTRAVENOUS at 02:01

## 2018-01-02 RX ADMIN — TRAMADOL HYDROCHLORIDE 50 MG: 50 TABLET, COATED ORAL at 02:01
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
## 2018-01-02 NOTE — ED NOTES
Physician at bedside, speaking with daughter in law and patient's son. No stool on rectal exam done per Dr. Adhikari.
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
## 2018-01-03 LAB — BACTERIA UR CULT: NORMAL

## 2018-01-10 ENCOUNTER — OFFICE VISIT (OUTPATIENT)
Dept: OPHTHALMOLOGY | Facility: CLINIC | Age: 83
End: 2018-01-10
Payer: MEDICARE

## 2018-01-10 DIAGNOSIS — H35.30 MACULAR DEGENERATION OF BOTH EYES: ICD-10-CM

## 2018-01-10 DIAGNOSIS — H34.8392 BRANCH RETINAL VEIN OCCLUSION: ICD-10-CM

## 2018-01-10 DIAGNOSIS — Z96.1 STATUS POST CATARACT EXTRACTION AND INSERTION OF INTRAOCULAR LENS, UNSPECIFIED LATERALITY: ICD-10-CM

## 2018-01-10 DIAGNOSIS — Z98.49 STATUS POST CATARACT EXTRACTION AND INSERTION OF INTRAOCULAR LENS, UNSPECIFIED LATERALITY: ICD-10-CM

## 2018-01-10 DIAGNOSIS — H31.102: ICD-10-CM

## 2018-01-10 DIAGNOSIS — H54.40 BLINDNESS OF LEFT EYE: ICD-10-CM

## 2018-01-10 DIAGNOSIS — H40.1132 PRIMARY OPEN-ANGLE GLAUCOMA, BILATERAL, MODERATE STAGE: Primary | ICD-10-CM

## 2018-01-10 PROCEDURE — 99999 PR PBB SHADOW E&M-EST. PATIENT-LVL II: CPT | Mod: PBBFAC,,, | Performed by: OPHTHALMOLOGY

## 2018-01-10 PROCEDURE — 92012 INTRM OPH EXAM EST PATIENT: CPT | Mod: S$GLB,,, | Performed by: OPHTHALMOLOGY

## 2018-01-11 ENCOUNTER — OFFICE VISIT (OUTPATIENT)
Dept: NEUROLOGY | Facility: CLINIC | Age: 83
End: 2018-01-11
Payer: MEDICARE

## 2018-01-11 VITALS — HEART RATE: 67 BPM | DIASTOLIC BLOOD PRESSURE: 62 MMHG | SYSTOLIC BLOOD PRESSURE: 133 MMHG | HEIGHT: 63 IN

## 2018-01-11 DIAGNOSIS — F03.91 DEMENTIA WITH BEHAVIORAL DISTURBANCE, UNSPECIFIED DEMENTIA TYPE: Primary | ICD-10-CM

## 2018-01-11 PROCEDURE — 99205 OFFICE O/P NEW HI 60 MIN: CPT | Mod: S$GLB,,, | Performed by: PSYCHIATRY & NEUROLOGY

## 2018-01-11 PROCEDURE — 99999 PR PBB SHADOW E&M-EST. PATIENT-LVL III: CPT | Mod: PBBFAC,,, | Performed by: PSYCHIATRY & NEUROLOGY

## 2018-01-11 RX ORDER — DIVALPROEX SODIUM 500 MG/1
500 TABLET, FILM COATED, EXTENDED RELEASE ORAL NIGHTLY
Qty: 90 TABLET | Refills: 1 | Status: SHIPPED | OUTPATIENT
Start: 2018-01-11 | End: 2019-01-11

## 2018-01-11 NOTE — PROGRESS NOTES
[facility] - NEUROLOGY  [facility] [place]    Date: January 12, 2018   Patient Name: Alyssa Hastings   MRN: [id number]   PCP: Sharif Real  Referring Provider: Self, Aaareferral    Assessment:      This is Alyssa Hastings, 87 y.o. 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  Nursing facility has recent begun using bed alarm, I think this is a good idea.  Given significantly prolonged Qtc, would avoid antipsychotics.     Plan:      -  VPA 500mg qhs    Follow up in 3 months       I discussed side effects of the medications. I asked the patient to stop the medication if she notices serious adverse effects as we discussed and to seek immediate medical attention at an ER.     Enrique Pa MD  [facility]   Department of Neurology    Subjective:      HPI:   Ms. Alyssa Hastings is a 87 y.o. female who presents for dementia    Patient lives in a nursing facility and has had multiple recent ED presentations for falls which occur when she attempts to get out of bed or ambulate.  She has had dementia for several years but has been becoming increasingly confused with sundowning.    PAST MEDICAL HISTORY:  Past Medical History:   Diagnosis Date    Coronary artery disease     Diabetes mellitus     GERD (gastroesophageal reflux disease)     Glaucoma     Hypertension     Stroke     Thyroid disease        PAST SURGICAL HISTORY:  Past Surgical History:   Procedure Laterality Date    APPENDECTOMY      CARDIAC SURGERY      CHOLECYSTECTOMY      EYE SURGERY      HYSTERECTOMY         CURRENT MEDS:  Current Outpatient Prescriptions   Medication Sig Dispense Refill    acetaminophen (TYLENOL) 650 MG TbSR Take 1 tablet (650 mg total) by mouth every 12 (twelve) hours as needed (start with daily prn). 60 tablet 1    amiodarone (PACERONE) 200 MG Tab TAKE ONE TABLET BY  MOUTH TWICE DAILY 60 tablet 5    aspirin 81 MG Chew Take 1 tablet (81 mg total) by mouth 2 (two) times daily. 100 tablet 3    bimatoprost (LUMIGAN) 0.03 % ophthalmic drops Place 1 drop into both eyes nightly. 2.5 mL 6    brimonidine-timolol (COMBIGAN) 0.2-0.5 % Drop Place 1 drop into the left eye 2 (two) times daily. 10 mL 6    docusate sodium (COLACE) 100 MG capsule Take 1 capsule (100 mg total) by mouth 2 (two) times daily as needed for Constipation. 60 capsule 0    dorzolamide (TRUSOPT) 2 % ophthalmic solution Place 1 drop into the left eye 2 (two) times daily. 10 mL 6    esomeprazole (NEXIUM) 40 MG capsule Take 1 capsule (40 mg total) by mouth before breakfast. 30 capsule 5    furosemide (LASIX) 20 MG tablet Take 1 tablet (20 mg total) by mouth once daily. May take an extra 20 mg weekly prn. 40 tablet 5    hyoscyamine (ANASPAZ,LEVSIN) 0.125 mg Tab Take 125 mcg by mouth every 4 (four) hours as needed.      ipratropium (ATROVENT) 0.02 % nebulizer solution Take 500 mcg by nebulization 4 (four) times daily. Rescue      ipratropium (ATROVENT) 0.06 % nasal spray USE 1 SPRAY INTO EACH NOSTRIL TWICE DAILY AS NEEDED FOR RHINITIS 15 mL 1    levocetirizine (XYZAL) 5 MG tablet TAKE ONE TABLET BY MOUTH EVERY MORNING 30 tablet 3    levothyroxine (SYNTHROID) 50 MCG tablet Take 1 tablet (50 mcg total) by mouth once daily. 30 tablet 5    lisinopril (PRINIVIL,ZESTRIL) 20 MG tablet TAKE ONE TABLET BY MOUTH EVERY DAY 30 tablet 5    METAMUCIL 0.52 gram capsule TAKE 2 TO 6 CAPSULES BY MOUTH DAILY OR TAKE 5 CAPSULES UP TO FOUR TIMES DAILY 100 capsule 1    metoprolol succinate (TOPROL-XL) 25 MG 24 hr tablet Take 1 tablet (25 mg total) by mouth once daily. 30 tablet 3    nitroGLYCERIN (NITROSTAT) 0.4 MG SL tablet Place 0.4 mg under the tongue every 5 (five) minutes as needed for Chest pain.      olopatadine (PATADAY) 0.2 % Drop Place 1 drop into both eyes once daily. 2.5 mL 6    PSYLLIUM HUSK/CALCIUM CARB (METAMUCIL  "PLUS CALCIUM ORAL) Take by mouth.      solifenacin (VESICARE) 10 MG tablet Take 1 tablet (10 mg total) by mouth once daily. 30 tablet 11    sucralfate (CARAFATE) 1 gram tablet Take 1 g by mouth 4 (four) times daily.      traMADol (ULTRAM) 50 mg tablet Take 1 tablet (50 mg total) by mouth every 6 (six) hours as needed for Pain. 40 tablet 0    trolamine salicylate (ASPERCREME) 10 % cream Apply topically as needed.      ciprofloxacin HCl (CIPRO) 500 MG tablet Take 1 tablet (500 mg total) by mouth 2 (two) times daily. 14 tablet 0    divalproex ER (DEPAKOTE) 500 MG Tb24 Take 1 tablet (500 mg total) by mouth every evening. 90 tablet 1    famotidine (PEPCID) 20 MG tablet Take 1 tablet (20 mg total) by mouth 2 (two) times daily. 56 tablet 0     No current facility-administered medications for this visit.        ALLERGIES:  Review of patient's allergies indicates:   Allergen Reactions    Actos [pioglitazone]     Amlodipine     Bentyl [dicyclomine]     Benzocaine     Declomycin [demeclocycline]     Doxycycline     Erythropoietin analogues     Gabapentin     Hydrocodone     Lipitor [atorvastatin]     Losartan     Metronidazole     Nortriptyline     Oxybutynin     Pcn [penicillins]     Potassium     Pravachol [pravastatin]     Procaine     Promethazine     Propranolol     Ramipril     Sulfa (sulfonamide antibiotics)        FAMILY HISTORY:  Family History   Problem Relation Age of Onset    Prostate cancer Neg Hx     Kidney disease Neg Hx        SOCIAL HISTORY:  Social History   Substance Use Topics    Smoking status: Never Smoker    Smokeless tobacco: Never Used    Alcohol use No       Review of Systems:  12 review of systems is negative except for the symptoms mentioned in HPI.        Objective:     Vitals:    01/11/18 1551   BP: 133/62   Pulse: 67   Height: 5' 3" (1.6 m)       General: NAD, well nourished   Eyes: no tearing, discharge, no erythema   ENT: moist mucous membranes of the oral " cavity, nares patent    Neck: Supple, full range of motion  Cardiovascular: Warm and well perfused, pulses equal and symmetrical  Lungs: Normal work of breathing, normal chest wall excursions  Skin: No rash, lesions, or breakdown on exposed skin  Psychiatry: Mood and affect are appropriate   Abdomen: soft, non tender, non distended  Extremeties: No cyanosis, clubbing or edema.    Neurological   MENTAL STATUS: Alert and able to follow commands and answer simple questions  CRANIAL NERVES: PERRL. EOMI. Facial sensation intact. Face symmetrical. Hearing grossly intact. Full shoulder shrug bilaterally. Tongue protrudes midline   SENSORY: Sensation is intact to light touch throughout.    MOTOR: Normal bulk and tone. No pronator drift.  5/5 deltoid, biceps, triceps, interosseous, hand  bilaterally. 5/5 iliopsoas, knee extension/flexion, foot dorsi/plantarflexion bilaterally.    REFLEXES: Ankles mute, remainder symmetric and 2+ throughout.   CEREBELLAR/COORDINATION/GAIT: unable to stand with out support, takes a few uncoordinated steps with support - high fall risk.  Heel to shin intact. Finger to nose intact.

## 2018-01-24 ENCOUNTER — TELEPHONE (OUTPATIENT)
Dept: GASTROENTEROLOGY | Facility: CLINIC | Age: 83
End: 2018-01-24

## 2018-01-24 NOTE — TELEPHONE ENCOUNTER
Son called to give an update on patient prior to appointment on Friday. After hip replacement patient is with [facility]huja progressing well.  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  Placed on Depakote.  Son is not able to attend appointment but would like to listen via phone during appointment.  Would like to discuss treatment options regarding biopsy and labs.

## 2018-01-26 ENCOUNTER — OFFICE VISIT (OUTPATIENT)
Dept: GASTROENTEROLOGY | Facility: CLINIC | Age: 83
End: 2018-01-26
Payer: MEDICARE

## 2018-01-26 VITALS — WEIGHT: 179 LBS | BODY MASS INDEX: 31.71 KG/M2

## 2018-01-26 DIAGNOSIS — R74.8 ABNORMAL LIVER ENZYMES: ICD-10-CM

## 2018-01-26 DIAGNOSIS — R16.0 LIVER MASS: ICD-10-CM

## 2018-01-26 DIAGNOSIS — K59.00 CONSTIPATION, UNSPECIFIED CONSTIPATION TYPE: Primary | ICD-10-CM

## 2018-01-26 PROCEDURE — 99999 PR PBB SHADOW E&M-EST. PATIENT-LVL I: CPT | Mod: PBBFAC,,, | Performed by: INTERNAL MEDICINE

## 2018-01-26 PROCEDURE — 99213 OFFICE O/P EST LOW 20 MIN: CPT | Mod: S$GLB,,, | Performed by: INTERNAL MEDICINE

## 2018-01-26 NOTE — LETTER
January 26, 2018      Enrique Pa MD  [address]  [facility] [address]           [place] [facility]  [address] [place] [address]  [place] [address]  Phone: [phone]          Patient: Alyssa Hastings   MR Number: [id number]   YOB: 1930   Date of Visit: 1/26/2018       Dear Dr. Enrique Pa:    Thank you for referring Alyssa Hastings to me for evaluation. Attached you will find relevant portions of my assessment and plan of care.    If you have questions, please do not hesitate to call me. I look forward to following Alyssa Hastings along with you.    Sincerely,    Vicky Beckford MD    Enclosure  CC:  No Recipients    If you would like to receive this communication electronically, please contact [email or web address] or [phone] to request more information on [facility] access.    For providers and/or their staff who would like to refer a patient to [facility], please contact us through our one-stop-shop provider referral line, [facility], at [phone].    If you feel you have received this communication in error or would no longer like to receive these types of communications, please e-mail [email or web address]

## 2018-01-26 NOTE — PROGRESS NOTES
Subjective:       Patient ID: Alyssa Hastings is a 87 y.o. female.    Chief Complaint: Follow-up    This is an 88yo female with a PMHx of CKD, CAD, HTN, CVA here for a f/u.  She was seen previously for moderate constipation in association with abnormal imaging. Previously it was of moderate severity and is described as 1 bowel movement every 7 days which is not particular hard. She has been on Dulcolax as needed since it responded to that previously fi.  She has tried fiber, miralax and bisacodyl suppositories.   Her last colonoscopy was at age 79 and she reports this was normal.  No overt GI bleeding or unintentional weight loss.    No other exacerbating or relieving factors or other associated symptoms. Discussed with son again today, LFTs also slightly up.      The following portions of the patient's history were reviewed and updated as appropriate: allergies, current medications, past family history, past medical history, past social history, past surgical history and problem list.    (Portions of this note were dictated using voice recognition software and may contain dictation related errors in spelling/grammar/syntax not found on text review)     HPI  Review of Systems   Cardiovascular: Negative for chest pain and leg swelling.   Gastrointestinal: Negative for abdominal distention, abdominal pain and constipation.       Objective:      Physical Exam   Constitutional: She appears well-developed and well-nourished.   HENT:   Head: Normocephalic and atraumatic.   Eyes: Conjunctivae are normal. No scleral icterus.   Neck: No tracheal deviation present. No thyromegaly present.   Pulmonary/Chest: Effort normal. No respiratory distress.   Abdominal: Soft. Bowel sounds are normal. There is no tenderness.   Psychiatric: She has a normal mood and affect. Her behavior is normal.   Nursing note and vitals reviewed.    Labs; reviewed  Assessment:       1. Constipation, unspecified constipation type    2. Abnormal liver  enzymes    3. Liver mass        Plan:   1. D/w pt, son (over telephone), caregiver at length  2. Pt does not want further w/u  3. Would recommend LFTs be monitored if having other labs drawn, suspect if malignancy will develop obstructive jaundice  4. Continue meds for constipation

## 2018-03-05 ENCOUNTER — TELEPHONE (OUTPATIENT)
Dept: NEUROLOGY | Facility: CLINIC | Age: 83
End: 2018-03-05

## 2018-03-06 ENCOUNTER — TELEPHONE (OUTPATIENT)
Dept: NEUROLOGY | Facility: CLINIC | Age: 83
End: 2018-03-06

## 2019-03-25 NOTE — SUBJECTIVE & OBJECTIVE
Past Medical History:   Diagnosis Date    Coronary artery disease     Diabetes mellitus     GERD (gastroesophageal reflux disease)     Glaucoma     Hypertension     Stroke     Thyroid disease        Past Surgical History:   Procedure Laterality Date    APPENDECTOMY      CARDIAC SURGERY      CHOLECYSTECTOMY      EYE SURGERY      HYSTERECTOMY         Review of patient's allergies indicates:   Allergen Reactions    Actos [pioglitazone]     Amlodipine     Bentyl [dicyclomine]     Benzocaine     Declomycin [demeclocycline]     Doxycycline     Erythropoietin analogues     Gabapentin     Hydrocodone     Lipitor [atorvastatin]     Losartan     Metronidazole     Nortriptyline     Oxybutynin     Pcn [penicillins]     Potassium     Pravachol [pravastatin]     Procaine     Promethazine     Propranolol     Ramipril     Sulfa (sulfonamide antibiotics)        No current facility-administered medications on file prior to encounter.      Current Outpatient Prescriptions on File Prior to Encounter   Medication Sig    acetaminophen (TYLENOL) 650 MG TbSR Take 1 tablet (650 mg total) by mouth every 12 (twelve) hours as needed (start with daily prn).    amiodarone (PACERONE) 200 MG Tab Take by mouth once daily.    aspirin 81 MG Chew Take 81 mg by mouth once daily.    bimatoprost (LUMIGAN) 0.03 % ophthalmic drops Place 1 drop into both eyes nightly.    brimonidine-timolol (COMBIGAN) 0.2-0.5 % Drop Place 1 drop into the left eye 2 (two) times daily.    docusate sodium (COLACE) 100 MG capsule Take 1 capsule (100 mg total) by mouth 2 (two) times daily as needed for Constipation.    dorzolamide (TRUSOPT) 2 % ophthalmic solution Place 1 drop into the left eye 2 (two) times daily.    esomeprazole (NEXIUM) 40 MG capsule Take 1 capsule (40 mg total) by mouth before breakfast.    hydrALAZINE (APRESOLINE) 50 MG tablet Take 1 tablet (50 mg total) by mouth 3 (three) times daily.    hyoscyamine  (ANASPAZ,LEVSIN) 0.125 mg Tab Take 125 mcg by mouth every 4 (four) hours as needed.    ipratropium (ATROVENT) 0.02 % nebulizer solution Take 500 mcg by nebulization 4 (four) times daily. Rescue    ipratropium (ATROVENT) 0.06 % nasal spray 1 spray by Nasal route 2 (two) times daily as needed for Rhinitis.    levocetirizine (XYZAL) 5 MG tablet TAKE ONE TABLET BY MOUTH EVERY MORNING    levothyroxine (SYNTHROID) 50 MCG tablet Take 1 tablet (50 mcg total) by mouth once daily.    lisinopril (PRINIVIL,ZESTRIL) 40 MG tablet Take 1 tablet (40 mg total) by mouth once daily. (Patient taking differently: Take 20 mg by mouth once daily. )    METAMUCIL 0.52 gram capsule TAKE 2 TO 6 CAPSULES BY MOUTH DAILY OR TAKE 5 CAPSULES UP TO FOUR TIMES DAILY    metoprolol succinate (TOPROL-XL) 25 MG 24 hr tablet Take 25 mg by mouth once daily.    nitroGLYCERIN (NITROSTAT) 0.4 MG SL tablet Place 0.4 mg under the tongue every 5 (five) minutes as needed for Chest pain.    olopatadine (PATADAY) 0.2 % Drop Place 1 drop into both eyes once daily.    PSYLLIUM HUSK/CALCIUM CARB (METAMUCIL PLUS CALCIUM ORAL) Take by mouth.    rosuvastatin (CRESTOR) 5 MG tablet Take 5 mg by mouth once daily.    solifenacin (VESICARE) 10 MG tablet Take 1 tablet (10 mg total) by mouth once daily.    sucralfate (CARAFATE) 1 gram tablet Take 1 g by mouth 4 (four) times daily.    tramadol (ULTRAM) 50 mg tablet Take 50 mg by mouth every 6 (six) hours as needed for Pain.    trolamine salicylate (ASPERCREME) 10 % cream Apply topically as needed.     Family History     None        Social History Main Topics    Smoking status: Never Smoker    Smokeless tobacco: Never Used    Alcohol use No    Drug use: No    Sexual activity: No     Review of Systems   Constitutional: Positive for chills and fatigue. Negative for diaphoresis and fever.   HENT: Positive for congestion, postnasal drip, sore throat and trouble swallowing. Negative for ear pain and voice change.     Eyes: Positive for visual disturbance. Negative for photophobia and pain.        Chronic blurred vision; blind left eye   Respiratory: Negative for cough, shortness of breath and wheezing.    Cardiovascular: Negative for chest pain, palpitations and leg swelling.   Gastrointestinal: Negative for abdominal pain, nausea and vomiting.   Endocrine: Negative for cold intolerance and heat intolerance.   Genitourinary: Negative for dysuria, frequency and urgency.   Musculoskeletal: Negative for arthralgias, gait problem and myalgias.   Skin: Negative for color change, pallor and rash.   Neurological: Negative for dizziness, weakness and headaches.   Hematological: Does not bruise/bleed easily.   Psychiatric/Behavioral: Negative for agitation, confusion and hallucinations.     Objective:     Vital Signs (Most Recent):  Temp: 97.3 °F (36.3 °C) (09/03/17 2329)  Pulse: 65 (09/03/17 2356)  Resp: 20 (09/03/17 2329)  BP: (!) 142/67 (09/03/17 2356)  SpO2: 95 % (09/03/17 2341) Vital Signs (24h Range):  Temp:  [97.3 °F (36.3 °C)-98.6 °F (37 °C)] 97.3 °F (36.3 °C)  Pulse:  [64-81] 65  Resp:  [16-20] 20  SpO2:  [95 %-98 %] 95 %  BP: (120-193)/(56-87) 142/67     Weight: 77.2 kg (170 lb 3.1 oz)  Body mass index is 33.24 kg/m².    Physical Exam   Constitutional: She is oriented to person, place, and time. She appears well-developed and well-nourished. No distress.   HENT:   Head: Normocephalic and atraumatic.   Right Ear: Decreased hearing is noted.   Left Ear: Decreased hearing is noted.   Mouth/Throat: Oropharynx is clear and moist.   Eyes: Conjunctivae are normal. Pupils are equal, round, and reactive to light. No scleral icterus.   glasses   Neck: Normal range of motion. Neck supple. No JVD present.   Cardiovascular: Normal rate, regular rhythm and intact distal pulses.    Pulmonary/Chest: Effort normal and breath sounds normal. No respiratory distress. She has no wheezes.   Abdominal: Soft. Bowel sounds are normal. She exhibits no  pt DNR/DNI, unable to obtain VS distension. There is no tenderness.   Musculoskeletal: Normal range of motion. She exhibits edema. She exhibits no tenderness.   2+ pitting edema to bilateral lower extremities.   Neurological: She is alert and oriented to person, place, and time. She has normal strength. GCS eye subscore is 4. GCS verbal subscore is 5. GCS motor subscore is 6.   Skin: Skin is warm, dry and intact.   Psychiatric: She has a normal mood and affect. Her speech is normal and behavior is normal.   Nursing note and vitals reviewed.       Significant Labs:   CBC:   Recent Labs  Lab 09/03/17  1439   WBC 8.99   HGB 9.0*   HCT 26.7*        CMP:   Recent Labs  Lab 09/03/17  1439   *   K 4.5   CL 93*   CO2 26      BUN 13   CREATININE 1.0   CALCIUM 8.8   PROT 6.8   ALBUMIN 3.2*   BILITOT 0.5   ALKPHOS 104   *   *   ANIONGAP 7*   EGFRNONAA 51*     Troponin:   Recent Labs  Lab 09/03/17  1439   TROPONINI <0.006     TSH:   Recent Labs  Lab 09/03/17  1439   TSH 8.242*     Free T4 0.71 - 1.51 ng/dL 1.22        Urine Studies:   Recent Labs  Lab 09/03/17  1621   COLORU Yellow   APPEARANCEUA Clear   PHUR 7.0   SPECGRAV 1.010   PROTEINUA Trace*   GLUCUA Negative   KETONESU Negative   BILIRUBINUA Negative   OCCULTUA Negative   NITRITE Negative   UROBILINOGEN Negative   LEUKOCYTESUR Negative     Sodium Urine Random 20 - 250 mmol/L 55        All pertinent labs within the past 24 hours have been reviewed.    Significant Imaging: I have reviewed all pertinent imaging results/findings within the past 24 hours.     X-Ray Chest PA And Lateral: Status post CABG. No acute cardiopulmonary disease.

## 2020-01-14 NOTE — TELEPHONE ENCOUNTER
Called all 3 numbers  Left messages letting her know that we need to reschedule her appointment    No

## 2024-07-08 NOTE — PROGRESS NOTES
----- Message from Sudha Marcos sent at 7/8/2024 10:48 AM EDT -----  Contact: Drea (sister) [phone]    ----- Message -----  From: Marialuisa Andujar MD  Sent: 7/8/2024  10:46 AM EDT  To: Sudha Marcos    Ok to restart Mounjaro  ----- Message -----  From: Alaina Weeks MA  Sent: 7/8/2024   8:39 AM EDT  To: Marialuisa Andujar MD    Patient was recently released from the hospital. While admitted, the patient was given insulin instead of her monjaro. The family wants to know if they are to restart the monjaro, or if she is switching to insulin? Please advise.       Adalberto Fernandez Orab          TN spoke with Clint @ [facility]'s daughters.  Written form will have to be received with Auth prior to patient being accepted. TN has updated UR department for assistance with matter. TN has also called Radha with [facility] @ [phone] and updated on facilities request.        update: 2pm-    pt has  beem declined by [facility]. 2nd choice [facility]  Has spoke to son, paper work provided. Plan for admit tomorrow.  Tn also spoke with Elisabet @ [facility] and updated of above. Elisabet will be out rest of day, but will work on  Case in am.

## (undated) DEVICE — DRAPE INCISE IOBAN 2 23X33IN

## (undated) DEVICE — SUT STRATAFIX PDS 1 CTX 18IN

## (undated) DEVICE — STAPLER SKIN ROTATING HEAD

## (undated) DEVICE — DRAPE STERI INSTRUMENT 1018

## (undated) DEVICE — SEE MEDLINE ITEM 157131

## (undated) DEVICE — DRAPE STERI U-SHAPED 47X51IN

## (undated) DEVICE — UNDERGLOVES BIOGEL PI SIZE 8

## (undated) DEVICE — CLOSURE SKIN STERI STRIP 1/2X4

## (undated) DEVICE — COVER OVERHEAD SURG LT BLUE

## (undated) DEVICE — SUT POLYGLACTIN 2-0 TP-1 27

## (undated) DEVICE — ADHESIVE DERMABOND ADVANCED

## (undated) DEVICE — DRAPE INCISE IOBAN 2 23X23IN

## (undated) DEVICE — GLOVE BIOGEL ORTHOPEDIC 7.5

## (undated) DEVICE — PACK BASIC

## (undated) DEVICE — ELECTRODE BLADE TEFLON 6

## (undated) DEVICE — PILLOW ABDUCTION FOAM MED

## (undated) DEVICE — SEE MEDLINE ITEM 157117

## (undated) DEVICE — DRAPE PLASTIC U 60X72

## (undated) DEVICE — DRESSING WND ADH PRIMAPORE 10

## (undated) DEVICE — SOL IRR NACL .9% 3000ML

## (undated) DEVICE — DURAPREP SURG SCRUB 26ML

## (undated) DEVICE — SEE MEDLINE ITEM 153151

## (undated) DEVICE — SUPPORT ULNA NERVE PROTECTOR

## (undated) DEVICE — SUT VICRYL 1 CT-1 27 UNDIE

## (undated) DEVICE — SEE MEDLINE ITEM 146292

## (undated) DEVICE — DRAPE HIP TIBURON 87X115X134

## (undated) DEVICE — PULSAVAC ZIMMER

## (undated) DEVICE — SUT CTD VICRYL 2.0

## (undated) DEVICE — DRESSING ABSRBNT ISLAND 3.6X8

## (undated) DEVICE — SEE MEDLINE ITEM 156955

## (undated) DEVICE — ALCOHOL 70% ISOP W/GREEN 16OZ

## (undated) DEVICE — SEE MEDLINE ITEM 152622

## (undated) DEVICE — SEE MEDLINE ITEM 157216

## (undated) DEVICE — COVER BACK TABLE 72X21

## (undated) DEVICE — ELECTRODE REM PLYHSV RETURN 9

## (undated) DEVICE — HOOD T-5 TEAR AWAY STERILE

## (undated) DEVICE — SEE MEDLINE ITEM 152530

## (undated) DEVICE — SPONGE LAP 18X18 PREWASHED

## (undated) DEVICE — KIT DRAIN HEMOVAC 3/16IN 400ML

## (undated) DEVICE — MANIFOLD 4 PORT

## (undated) DEVICE — DRAPE C-ARM/MOBILE XRAY 44X80

## (undated) DEVICE — BLADE SURG CARBON STEEL #10

## (undated) DEVICE — BLADE SAW SAG 25.40MM X 1.27MM

## (undated) DEVICE — SUT VICRYL 1 OB 36 CTX

## (undated) DEVICE — SEE MEDLINE ITEM 157116

## (undated) DEVICE — PAD PREP 50/CA

## (undated) DEVICE — GAUZE SPONGE 4X4 12PLY